# Patient Record
Sex: FEMALE | Race: WHITE | NOT HISPANIC OR LATINO | Employment: OTHER | URBAN - METROPOLITAN AREA
[De-identification: names, ages, dates, MRNs, and addresses within clinical notes are randomized per-mention and may not be internally consistent; named-entity substitution may affect disease eponyms.]

---

## 2018-01-25 ENCOUNTER — HOSPITAL ENCOUNTER (OUTPATIENT)
Dept: LAB | Facility: HOSPITAL | Age: 53
Discharge: HOME | End: 2018-01-25
Attending: INTERNAL MEDICINE | Admitting: INTERNAL MEDICINE

## 2018-01-25 LAB
ALBUMIN SERPL-MCNC: 3.5 G/DL (ref 3.5–4.8)
ALBUMIN/GLOB SERPL: 0.9 {RATIO} (ref 1–1.7)
ALP SERPL-CCNC: 46 IU/L (ref 32–91)
ALT SERPL-CCNC: 15 IU/L (ref 14–54)
ANION GAP SERPL CALC-SCNC: 10 MMOL/L (ref 10–20)
AST SERPL-CCNC: 14 IU/L (ref 15–41)
BASOPHILS # BLD AUTO: 0 10*3/UL (ref 0–0.2)
BASOPHILS NFR BLD AUTO: 0 % (ref 0–2)
BILIRUB SERPL-MCNC: 0.5 MG/DL (ref 0.3–1.2)
BILIRUB UR QL STRIP: NEGATIVE MG/DL
BUN SERPL-MCNC: 12 MG/DL (ref 8–20)
BUN/CREAT SERPL: 20 (ref 5.4–26.2)
CALCIUM SERPL-MCNC: 9.4 MG/DL (ref 8.9–10.3)
CASTS URNS QL MICRO: NORMAL /[LPF]
CHLORIDE SERPL-SCNC: 103 MMOL/L (ref 101–111)
COLOR UR: YELLOW
CONV BACTERIA IN URINE MICRO: NEGATIVE
CONV CLARITY OF URINE: CLEAR
CONV CO2: 28 MMOL/L (ref 22–32)
CONV HYALINE CASTS IN URINE MICRO: 0 /[LPF] (ref 0–5)
CONV PROTEIN IN URINE BY AUTOMATED TEST STRIP: NEGATIVE MG/DL
CONV SMALL ROUND CELLS: NORMAL /[HPF]
CONV TOTAL PROTEIN: 7.4 G/DL (ref 6.1–7.9)
CONV UROBILINOGEN IN URINE BY AUTOMATED TEST STRIP: 0.2 MG/DL
CREAT UR-MCNC: 0.6 MG/DL (ref 0.4–1)
CRP SERPL-MCNC: 1.93 MG/DL (ref 0–0.7)
CULTURE INDICATED?: NORMAL
DIFFERENTIAL METHOD BLD: (no result)
EOSINOPHIL # BLD AUTO: 0.1 10*3/UL (ref 0–0.3)
EOSINOPHIL # BLD AUTO: 1 % (ref 0–3)
ERYTHROCYTE [DISTWIDTH] IN BLOOD BY AUTOMATED COUNT: 14.5 % (ref 11.5–14.5)
ERYTHROCYTE [SEDIMENTATION RATE] IN BLOOD BY WESTERGREN METHOD: 49 MM/HR (ref 0–30)
GLOBULIN UR ELPH-MCNC: 3.9 G/DL (ref 2.5–3.8)
GLUCOSE SERPL-MCNC: 170 MG/DL (ref 65–99)
GLUCOSE UR QL: NEGATIVE MG/DL
HCT VFR BLD AUTO: 42.4 % (ref 35–49)
HGB BLD-MCNC: 14.1 G/DL (ref 12–15)
HGB UR QL STRIP: NEGATIVE
KETONES UR QL STRIP: NEGATIVE MG/DL
LEUKOCYTE ESTERASE UR QL STRIP: NEGATIVE
LYMPHOCYTES # BLD AUTO: 1.5 10*3/UL (ref 0.8–4.8)
LYMPHOCYTES NFR BLD AUTO: 16 % (ref 18–42)
MCH RBC QN AUTO: 29.3 PG (ref 26–32)
MCHC RBC AUTO-ENTMCNC: 33.2 G/DL (ref 32–36)
MCV RBC AUTO: 88.2 FL (ref 80–94)
MONOCYTES # BLD AUTO: 0.6 10*3/UL (ref 0.1–1.3)
MONOCYTES NFR BLD AUTO: 6 % (ref 2–11)
NEUTROPHILS # BLD AUTO: 6.9 10*3/UL (ref 2.3–8.6)
NEUTROPHILS NFR BLD AUTO: 77 % (ref 50–75)
NITRITE UR QL STRIP: NEGATIVE
NRBC BLD AUTO-RTO: 0 /100{WBCS}
NRBC/RBC NFR BLD MANUAL: 0 10*3/UL
PH UR STRIP.AUTO: 6 [PH] (ref 4.5–8)
PLATELET # BLD AUTO: 199 10*3/UL (ref 150–450)
PMV BLD AUTO: 8.9 FL (ref 7.4–10.4)
POTASSIUM SERPL-SCNC: 4 MMOL/L (ref 3.6–5.1)
RBC # BLD AUTO: 4.8 10*6/UL (ref 4–5.4)
RBC #/AREA URNS HPF: 0 /[HPF] (ref 0–3)
SODIUM SERPL-SCNC: 137 MMOL/L (ref 136–144)
SP GR UR: 1.01 (ref 1–1.03)
SPERM URNS QL MICRO: NORMAL /[HPF]
SQUAMOUS SPT QL MICRO: 1 /[HPF] (ref 0–5)
UNIDENT CRYS URNS QL MICRO: NORMAL /[HPF]
WBC # BLD AUTO: 9.2 10*3/UL (ref 4.5–11.5)
WBC #/AREA URNS HPF: 0 /[HPF] (ref 0–5)
YEAST SPEC QL WET PREP: NORMAL /[HPF]

## 2018-04-11 ENCOUNTER — HOSPITAL ENCOUNTER (OUTPATIENT)
Dept: RHEUMATOLOGY | Facility: CLINIC | Age: 53
Discharge: HOME | End: 2018-04-11
Attending: INTERNAL MEDICINE | Admitting: INTERNAL MEDICINE

## 2018-06-26 ENCOUNTER — HOSPITAL ENCOUNTER (OUTPATIENT)
Dept: LAB | Facility: HOSPITAL | Age: 53
Discharge: HOME | End: 2018-06-26
Attending: INTERNAL MEDICINE | Admitting: INTERNAL MEDICINE

## 2018-06-26 LAB
ALBUMIN SERPL-MCNC: 3.5 G/DL (ref 3.5–4.8)
ALBUMIN/GLOB SERPL: 1 {RATIO} (ref 1–1.7)
ALP SERPL-CCNC: 54 IU/L (ref 32–91)
ALT SERPL-CCNC: 16 IU/L (ref 14–54)
ANION GAP SERPL CALC-SCNC: 10.9 MMOL/L (ref 10–20)
AST SERPL-CCNC: 15 IU/L (ref 15–41)
BASOPHILS # BLD AUTO: 0.1 10*3/UL (ref 0–0.2)
BASOPHILS NFR BLD AUTO: 1 % (ref 0–2)
BILIRUB SERPL-MCNC: 0.5 MG/DL (ref 0.3–1.2)
BILIRUB UR QL STRIP: NEGATIVE MG/DL
BUN SERPL-MCNC: 14 MG/DL (ref 8–20)
BUN/CREAT SERPL: 17.5 (ref 5.4–26.2)
CALCIUM SERPL-MCNC: 9.2 MG/DL (ref 8.9–10.3)
CASTS URNS QL MICRO: ABNORMAL /[LPF]
CHLORIDE SERPL-SCNC: 101 MMOL/L (ref 101–111)
COLOR UR: YELLOW
CONV BACTERIA IN URINE MICRO: NEGATIVE
CONV CLARITY OF URINE: CLEAR
CONV CO2: 28 MMOL/L (ref 22–32)
CONV HYALINE CASTS IN URINE MICRO: 0 /[LPF] (ref 0–5)
CONV PROTEIN IN URINE BY AUTOMATED TEST STRIP: NEGATIVE MG/DL
CONV SMALL ROUND CELLS: ABNORMAL /[HPF]
CONV TOTAL PROTEIN: 7 G/DL (ref 6.1–7.9)
CONV UROBILINOGEN IN URINE BY AUTOMATED TEST STRIP: 0.2 MG/DL
CREAT UR-MCNC: 0.8 MG/DL (ref 0.4–1)
CRP SERPL-MCNC: 1.52 MG/DL (ref 0–0.7)
CULTURE INDICATED?: ABNORMAL
DIFFERENTIAL METHOD BLD: (no result)
EOSINOPHIL # BLD AUTO: 0.1 10*3/UL (ref 0–0.3)
EOSINOPHIL # BLD AUTO: 2 % (ref 0–3)
ERYTHROCYTE [DISTWIDTH] IN BLOOD BY AUTOMATED COUNT: 15.9 % (ref 11.5–14.5)
ERYTHROCYTE [SEDIMENTATION RATE] IN BLOOD BY WESTERGREN METHOD: 27 MM/HR (ref 0–30)
GLOBULIN UR ELPH-MCNC: 3.5 G/DL (ref 2.5–3.8)
GLUCOSE SERPL-MCNC: 257 MG/DL (ref 65–99)
GLUCOSE UR QL: 250 MG/DL
HAV IGM SERPL QL IA: NONREACTIVE
HBV CORE IGM SERPL QL IA: NONREACTIVE
HBV SURFACE AG SERPL QL IA: NONREACTIVE
HCT VFR BLD AUTO: 43.3 % (ref 35–49)
HCV AB SER DONR QL: NORMAL
HCV AB SER DONR QL: NORMAL
HGB BLD-MCNC: 14.2 G/DL (ref 12–15)
HGB UR QL STRIP: NEGATIVE
KETONES UR QL STRIP: NEGATIVE MG/DL
LEUKOCYTE ESTERASE UR QL STRIP: NEGATIVE
LYMPHOCYTES # BLD AUTO: 1.6 10*3/UL (ref 0.8–4.8)
LYMPHOCYTES NFR BLD AUTO: 19 % (ref 18–42)
MCH RBC QN AUTO: 28.9 PG (ref 26–32)
MCHC RBC AUTO-ENTMCNC: 32.7 G/DL (ref 32–36)
MCV RBC AUTO: 88.4 FL (ref 80–94)
MONOCYTES # BLD AUTO: 0.7 10*3/UL (ref 0.1–1.3)
MONOCYTES NFR BLD AUTO: 8 % (ref 2–11)
NEUTROPHILS # BLD AUTO: 6.2 10*3/UL (ref 2.3–8.6)
NEUTROPHILS NFR BLD AUTO: 70 % (ref 50–75)
NITRITE UR QL STRIP: NEGATIVE
NRBC BLD AUTO-RTO: 0 /100{WBCS}
NRBC/RBC NFR BLD MANUAL: 0 10*3/UL
PH UR STRIP.AUTO: 6.5 [PH] (ref 4.5–8)
PLATELET # BLD AUTO: 231 10*3/UL (ref 150–450)
PMV BLD AUTO: 8.6 FL (ref 7.4–10.4)
POTASSIUM SERPL-SCNC: 3.9 MMOL/L (ref 3.6–5.1)
RBC # BLD AUTO: 4.9 10*6/UL (ref 4–5.4)
RBC #/AREA URNS HPF: 1 /[HPF] (ref 0–3)
SODIUM SERPL-SCNC: 136 MMOL/L (ref 136–144)
SP GR UR: 1.01 (ref 1–1.03)
SPERM URNS QL MICRO: ABNORMAL /[HPF]
SQUAMOUS SPT QL MICRO: 0 /[HPF] (ref 0–5)
UNIDENT CRYS URNS QL MICRO: ABNORMAL /[HPF]
WBC # BLD AUTO: 8.8 10*3/UL (ref 4.5–11.5)
WBC #/AREA URNS HPF: 0 /[HPF] (ref 0–5)
YEAST SPEC QL WET PREP: ABNORMAL /[HPF]

## 2018-06-26 PROCEDURE — 86481 TB AG RESPONSE T-CELL SUSP: CPT

## 2018-06-27 ENCOUNTER — LAB REQUISITION (OUTPATIENT)
Dept: LAB | Facility: HOSPITAL | Age: 53
End: 2018-06-27

## 2018-06-27 DIAGNOSIS — Z00.00 ROUTINE GENERAL MEDICAL EXAMINATION AT A HEALTH CARE FACILITY: ICD-10-CM

## 2018-06-27 LAB
CONV HIV-1/ HIV-2: NORMAL
CONV HIV-1/ HIV-2: NORMAL

## 2018-06-28 LAB
INTERPRETATION UR IFE-IMP: NORMAL
PROT PATTERN SERPL IFE-IMP: NORMAL
TSPOT INTERPRETATION: NEGATIVE
TSPOT INTERPRETATION: NORMAL
TSPOT NIL CONTROL INTERPRETATION: NORMAL
TSPOT PANEL A: 0
TSPOT PANEL B: 0
TSPOT POS CONTROL INTERPRETATION: NORMAL

## 2018-09-28 ENCOUNTER — HOSPITAL ENCOUNTER (OUTPATIENT)
Dept: LAB | Facility: HOSPITAL | Age: 53
Discharge: HOME | End: 2018-09-28
Attending: INTERNAL MEDICINE | Admitting: INTERNAL MEDICINE

## 2018-09-28 LAB
ALBUMIN SERPL-MCNC: 3.6 G/DL (ref 3.5–4.8)
ALBUMIN/GLOB SERPL: 1.1 {RATIO} (ref 1–1.7)
ALP SERPL-CCNC: 54 IU/L (ref 32–91)
ALT SERPL-CCNC: 14 IU/L (ref 14–54)
ANION GAP SERPL CALC-SCNC: 11.7 MMOL/L (ref 10–20)
AST SERPL-CCNC: 15 IU/L (ref 15–41)
BASOPHILS # BLD AUTO: 0 10*3/UL (ref 0–0.2)
BASOPHILS NFR BLD AUTO: 1 % (ref 0–2)
BILIRUB SERPL-MCNC: 0.4 MG/DL (ref 0.3–1.2)
BILIRUB UR QL STRIP: NEGATIVE MG/DL
BUN SERPL-MCNC: 17 MG/DL (ref 8–20)
BUN/CREAT SERPL: 21.3 (ref 5.4–26.2)
CALCIUM SERPL-MCNC: 9.2 MG/DL (ref 8.9–10.3)
CASTS URNS QL MICRO: NORMAL /[LPF]
CHLORIDE SERPL-SCNC: 102 MMOL/L (ref 101–111)
COLOR UR: YELLOW
CONV BACTERIA IN URINE MICRO: NEGATIVE
CONV CLARITY OF URINE: CLEAR
CONV CO2: 29 MMOL/L (ref 22–32)
CONV HYALINE CASTS IN URINE MICRO: 2 /[LPF] (ref 0–5)
CONV PROTEIN IN URINE BY AUTOMATED TEST STRIP: NEGATIVE MG/DL
CONV SMALL ROUND CELLS: NORMAL /[HPF]
CONV TOTAL PROTEIN: 6.9 G/DL (ref 6.1–7.9)
CONV UROBILINOGEN IN URINE BY AUTOMATED TEST STRIP: 0.2 MG/DL
CREAT UR-MCNC: 0.8 MG/DL (ref 0.4–1)
CRP SERPL-MCNC: 1.13 MG/DL (ref 0–0.7)
CULTURE INDICATED?: NORMAL
DIFFERENTIAL METHOD BLD: (no result)
EOSINOPHIL # BLD AUTO: 0.2 10*3/UL (ref 0–0.3)
EOSINOPHIL # BLD AUTO: 3 % (ref 0–3)
ERYTHROCYTE [DISTWIDTH] IN BLOOD BY AUTOMATED COUNT: 15.6 % (ref 11.5–14.5)
ERYTHROCYTE [SEDIMENTATION RATE] IN BLOOD BY WESTERGREN METHOD: 35 MM/HR (ref 0–30)
GLOBULIN UR ELPH-MCNC: 3.3 G/DL (ref 2.5–3.8)
GLUCOSE SERPL-MCNC: 152 MG/DL (ref 65–99)
GLUCOSE UR QL: NEGATIVE MG/DL
HCT VFR BLD AUTO: 40.4 % (ref 35–49)
HGB BLD-MCNC: 13.6 G/DL (ref 12–15)
HGB UR QL STRIP: NEGATIVE
KETONES UR QL STRIP: NEGATIVE MG/DL
LEUKOCYTE ESTERASE UR QL STRIP: NEGATIVE
LYMPHOCYTES # BLD AUTO: 2.3 10*3/UL (ref 0.8–4.8)
LYMPHOCYTES NFR BLD AUTO: 24 % (ref 18–42)
MCH RBC QN AUTO: 30.9 PG (ref 26–32)
MCHC RBC AUTO-ENTMCNC: 33.7 G/DL (ref 32–36)
MCV RBC AUTO: 91.7 FL (ref 80–94)
MONOCYTES # BLD AUTO: 0.9 10*3/UL (ref 0.1–1.3)
MONOCYTES NFR BLD AUTO: 9 % (ref 2–11)
NEUTROPHILS # BLD AUTO: 6.2 10*3/UL (ref 2.3–8.6)
NEUTROPHILS NFR BLD AUTO: 63 % (ref 50–75)
NITRITE UR QL STRIP: NEGATIVE
NRBC BLD AUTO-RTO: 0 /100{WBCS}
NRBC/RBC NFR BLD MANUAL: 0 10*3/UL
PH UR STRIP.AUTO: 5 [PH] (ref 4.5–8)
PLATELET # BLD AUTO: 227 10*3/UL (ref 150–450)
PMV BLD AUTO: 8 FL (ref 7.4–10.4)
POTASSIUM SERPL-SCNC: 3.7 MMOL/L (ref 3.6–5.1)
RBC # BLD AUTO: 4.41 10*6/UL (ref 4–5.4)
RBC #/AREA URNS HPF: 1 /[HPF] (ref 0–3)
SODIUM SERPL-SCNC: 139 MMOL/L (ref 136–144)
SP GR UR: 1.02 (ref 1–1.03)
SPERM URNS QL MICRO: NORMAL /[HPF]
SQUAMOUS SPT QL MICRO: 3 /[HPF] (ref 0–5)
UNIDENT CRYS URNS QL MICRO: NORMAL /[HPF]
WBC # BLD AUTO: 9.7 10*3/UL (ref 4.5–11.5)
WBC #/AREA URNS HPF: 0 /[HPF] (ref 0–5)
YEAST SPEC QL WET PREP: NORMAL /[HPF]

## 2019-05-01 ENCOUNTER — HOSPITAL ENCOUNTER (OUTPATIENT)
Dept: LAB | Facility: HOSPITAL | Age: 54
Discharge: HOME | End: 2019-05-01
Attending: INTERNAL MEDICINE | Admitting: INTERNAL MEDICINE

## 2019-05-20 ENCOUNTER — CONVERSION ENCOUNTER (OUTPATIENT)
Dept: RHEUMATOLOGY | Facility: CLINIC | Age: 54
End: 2019-05-20

## 2019-06-04 VITALS
HEART RATE: 94 BPM | BODY MASS INDEX: 48.82 KG/M2 | DIASTOLIC BLOOD PRESSURE: 83 MMHG | HEIGHT: 65 IN | WEIGHT: 293 LBS | SYSTOLIC BLOOD PRESSURE: 154 MMHG

## 2019-06-20 NOTE — TELEPHONE ENCOUNTER
Phone Note   Outgoing Call  Summary of Call: Please run benefits for Humira she had her first injection today..Thanks    Routed to [place]  Call placed by: Rocio Elizabeth CMA,  May 20, 2019 2:54 PM    Follow-up for Phone Call   Follow-up Details: Humira - submitted to [facility].  Follow-up by: Cookie Guerrero,  June 12, 2019 1:58 PM            Electronically signed by Cookie Guerrero on 06/20/2019 at 10:35 AM  ________________________________________________________________________       Disclaimer: Converted Note message may not contain all data elements that existed in the [employer] source system. Please see [facility] for the original note details.

## 2019-06-28 RX ORDER — DULOXETIN HYDROCHLORIDE 30 MG/1
CAPSULE, DELAYED RELEASE ORAL
Qty: 180 CAPSULE | Refills: 1 | Status: SHIPPED | OUTPATIENT
Start: 2019-06-28 | End: 2020-01-21

## 2019-08-29 RX ORDER — METHOTREXATE 25 MG/ML
INJECTION INTRA-ARTERIAL; INTRAMUSCULAR; INTRATHECAL; INTRAVENOUS
Qty: 2 ML | Refills: 6 | Status: SHIPPED | OUTPATIENT
Start: 2019-08-29 | End: 2020-05-20 | Stop reason: SDUPTHER

## 2019-09-16 RX ORDER — ADALIMUMAB 40MG/0.4ML
KIT SUBCUTANEOUS
Qty: 2 ML | Refills: 1 | Status: SHIPPED | OUTPATIENT
Start: 2019-09-16 | End: 2019-11-08 | Stop reason: SDUPTHER

## 2019-10-17 ENCOUNTER — LAB (OUTPATIENT)
Dept: LAB | Facility: HOSPITAL | Age: 54
End: 2019-10-17

## 2019-10-17 ENCOUNTER — TRANSCRIBE ORDERS (OUTPATIENT)
Dept: LAB | Facility: HOSPITAL | Age: 54
End: 2019-10-17

## 2019-10-17 DIAGNOSIS — N39.0 URINARY TRACT INFECTION WITHOUT HEMATURIA, SITE UNSPECIFIED: ICD-10-CM

## 2019-10-17 DIAGNOSIS — M13.80 MIGRATORY POLYARTHRITIS: ICD-10-CM

## 2019-10-17 DIAGNOSIS — Z79.899 ENCOUNTER FOR LONG-TERM (CURRENT) USE OF OTHER MEDICATIONS: Primary | ICD-10-CM

## 2019-10-17 DIAGNOSIS — Z79.899 ENCOUNTER FOR LONG-TERM (CURRENT) USE OF OTHER MEDICATIONS: ICD-10-CM

## 2019-10-17 LAB
ALBUMIN SERPL-MCNC: 4.2 G/DL (ref 3.5–5.2)
ALBUMIN/GLOB SERPL: 1.3 G/DL
ALP SERPL-CCNC: 46 U/L (ref 39–117)
ALT SERPL W P-5'-P-CCNC: 19 U/L (ref 1–33)
ANION GAP SERPL CALCULATED.3IONS-SCNC: 12 MMOL/L (ref 5–15)
AST SERPL-CCNC: 9 U/L (ref 1–32)
BACTERIA UR QL AUTO: ABNORMAL /HPF
BASOPHILS # BLD AUTO: 0.04 10*3/MM3 (ref 0–0.2)
BASOPHILS NFR BLD AUTO: 0.5 % (ref 0–1.5)
BILIRUB SERPL-MCNC: 0.5 MG/DL (ref 0.2–1.2)
BILIRUB UR QL STRIP: NEGATIVE
BUN BLD-MCNC: 14 MG/DL (ref 6–20)
BUN/CREAT SERPL: 18.4 (ref 7–25)
CALCIUM SPEC-SCNC: 9.3 MG/DL (ref 8.6–10.5)
CHLORIDE SERPL-SCNC: 95 MMOL/L (ref 98–107)
CLARITY UR: CLEAR
CO2 SERPL-SCNC: 29 MMOL/L (ref 22–29)
COLOR UR: YELLOW
CREAT BLD-MCNC: 0.76 MG/DL (ref 0.57–1)
CRP SERPL-MCNC: 0.95 MG/DL (ref 0–0.5)
DEPRECATED RDW RBC AUTO: 45.3 FL (ref 37–54)
EOSINOPHIL # BLD AUTO: 0.14 10*3/MM3 (ref 0–0.4)
EOSINOPHIL NFR BLD AUTO: 1.7 % (ref 0.3–6.2)
ERYTHROCYTE [DISTWIDTH] IN BLOOD BY AUTOMATED COUNT: 13.6 % (ref 12.3–15.4)
ERYTHROCYTE [SEDIMENTATION RATE] IN BLOOD: 44 MM/HR (ref 0–30)
GFR SERPL CREATININE-BSD FRML MDRD: 79 ML/MIN/1.73
GLOBULIN UR ELPH-MCNC: 3.3 GM/DL
GLUCOSE BLD-MCNC: 110 MG/DL (ref 65–99)
GLUCOSE UR STRIP-MCNC: NEGATIVE MG/DL
HCT VFR BLD AUTO: 40.6 % (ref 34–46.6)
HGB BLD-MCNC: 13.9 G/DL (ref 12–15.9)
HGB UR QL STRIP.AUTO: NEGATIVE
HYALINE CASTS UR QL AUTO: ABNORMAL /LPF
IMM GRANULOCYTES # BLD AUTO: 0.04 10*3/MM3 (ref 0–0.05)
IMM GRANULOCYTES NFR BLD AUTO: 0.5 % (ref 0–0.5)
KETONES UR QL STRIP: NEGATIVE
LEUKOCYTE ESTERASE UR QL STRIP.AUTO: ABNORMAL
LYMPHOCYTES # BLD AUTO: 2.03 10*3/MM3 (ref 0.7–3.1)
LYMPHOCYTES NFR BLD AUTO: 23.9 % (ref 19.6–45.3)
MCH RBC QN AUTO: 31.5 PG (ref 26.6–33)
MCHC RBC AUTO-ENTMCNC: 34.2 G/DL (ref 31.5–35.7)
MCV RBC AUTO: 92.1 FL (ref 79–97)
MONOCYTES # BLD AUTO: 0.63 10*3/MM3 (ref 0.1–0.9)
MONOCYTES NFR BLD AUTO: 7.4 % (ref 5–12)
NEUTROPHILS # BLD AUTO: 5.6 10*3/MM3 (ref 1.7–7)
NEUTROPHILS NFR BLD AUTO: 66 % (ref 42.7–76)
NITRITE UR QL STRIP: NEGATIVE
NRBC BLD AUTO-RTO: 0 /100 WBC (ref 0–0.2)
PH UR STRIP.AUTO: 7 [PH] (ref 5–8)
PLATELET # BLD AUTO: 251 10*3/MM3 (ref 140–450)
PMV BLD AUTO: 10 FL (ref 6–12)
POTASSIUM BLD-SCNC: 3.7 MMOL/L (ref 3.5–5.2)
PROT SERPL-MCNC: 7.5 G/DL (ref 6–8.5)
PROT UR QL STRIP: NEGATIVE
RBC # BLD AUTO: 4.41 10*6/MM3 (ref 3.77–5.28)
RBC # UR: ABNORMAL /HPF
REF LAB TEST METHOD: ABNORMAL
SODIUM BLD-SCNC: 136 MMOL/L (ref 136–145)
SP GR UR STRIP: 1.02 (ref 1–1.03)
SQUAMOUS #/AREA URNS HPF: ABNORMAL /HPF
UROBILINOGEN UR QL STRIP: ABNORMAL
WBC NRBC COR # BLD: 8.48 10*3/MM3 (ref 3.4–10.8)
WBC UR QL AUTO: ABNORMAL /HPF

## 2019-10-17 PROCEDURE — 81001 URINALYSIS AUTO W/SCOPE: CPT

## 2019-10-17 PROCEDURE — 85025 COMPLETE CBC W/AUTO DIFF WBC: CPT

## 2019-10-17 PROCEDURE — 86140 C-REACTIVE PROTEIN: CPT

## 2019-10-17 PROCEDURE — 80053 COMPREHEN METABOLIC PANEL: CPT

## 2019-10-17 PROCEDURE — 85652 RBC SED RATE AUTOMATED: CPT

## 2019-10-17 PROCEDURE — 36415 COLL VENOUS BLD VENIPUNCTURE: CPT

## 2019-10-28 ENCOUNTER — OFFICE VISIT (OUTPATIENT)
Dept: RHEUMATOLOGY | Facility: CLINIC | Age: 54
End: 2019-10-28

## 2019-10-28 VITALS
HEIGHT: 65 IN | DIASTOLIC BLOOD PRESSURE: 81 MMHG | WEIGHT: 293 LBS | HEART RATE: 65 BPM | BODY MASS INDEX: 48.82 KG/M2 | SYSTOLIC BLOOD PRESSURE: 136 MMHG

## 2019-10-28 DIAGNOSIS — M15.9 OSTEOARTHRITIS OF MULTIPLE JOINTS, UNSPECIFIED OSTEOARTHRITIS TYPE: ICD-10-CM

## 2019-10-28 DIAGNOSIS — M79.7 FIBROMYALGIA: ICD-10-CM

## 2019-10-28 DIAGNOSIS — M19.90 INFLAMMATORY ARTHRITIS: Primary | ICD-10-CM

## 2019-10-28 DIAGNOSIS — Z79.899 LONG-TERM USE OF HIGH-RISK MEDICATION: ICD-10-CM

## 2019-10-28 DIAGNOSIS — M81.0 OSTEOPOROSIS, UNSPECIFIED OSTEOPOROSIS TYPE, UNSPECIFIED PATHOLOGICAL FRACTURE PRESENCE: ICD-10-CM

## 2019-10-28 PROBLEM — E55.9 VITAMIN D DEFICIENCY: Status: ACTIVE | Noted: 2018-01-25

## 2019-10-28 PROBLEM — F32.A DEPRESSION: Status: ACTIVE | Noted: 2018-04-11

## 2019-10-28 PROBLEM — E11.9 DIABETES MELLITUS (HCC): Status: ACTIVE | Noted: 2019-10-28

## 2019-10-28 PROBLEM — R53.83 FATIGUE: Status: ACTIVE | Noted: 2018-04-11

## 2019-10-28 PROBLEM — R70.0 ELEVATED ERYTHROCYTE SEDIMENTATION RATE: Status: ACTIVE | Noted: 2018-04-11

## 2019-10-28 PROBLEM — M85.88 OTHER SPECIFIED DISORDERS OF BONE DENSITY AND STRUCTURE, OTHER SITE: Status: ACTIVE | Noted: 2018-10-26

## 2019-10-28 PROBLEM — E66.9 OBESITY: Status: ACTIVE | Noted: 2018-04-11

## 2019-10-28 PROBLEM — Z13.820 ENCOUNTER FOR SCREENING FOR OSTEOPOROSIS: Status: ACTIVE | Noted: 2018-10-26

## 2019-10-28 PROBLEM — M75.80 ROTATOR CUFF TENDONITIS: Status: ACTIVE | Noted: 2018-07-10

## 2019-10-28 PROBLEM — M25.532 ARTHRALGIA OF LEFT WRIST: Status: ACTIVE | Noted: 2018-10-26

## 2019-10-28 PROBLEM — M13.80 SERONEGATIVE ARTHRITIS: Status: ACTIVE | Noted: 2018-07-10

## 2019-10-28 PROBLEM — R07.9 CHEST PAIN: Status: ACTIVE | Noted: 2019-10-28

## 2019-10-28 PROBLEM — N39.0 URINARY TRACT INFECTION: Status: ACTIVE | Noted: 2018-01-25

## 2019-10-28 PROBLEM — M25.50 JOINT PAIN: Status: ACTIVE | Noted: 2018-01-25

## 2019-10-28 PROBLEM — G56.03 CARPAL TUNNEL SYNDROME, BILATERAL UPPER LIMBS: Status: ACTIVE | Noted: 2018-10-26

## 2019-10-28 PROCEDURE — 99214 OFFICE O/P EST MOD 30 MIN: CPT | Performed by: INTERNAL MEDICINE

## 2019-10-28 RX ORDER — METOPROLOL SUCCINATE 50 MG/1
50 TABLET, EXTENDED RELEASE ORAL EVERY EVENING
Refills: 1 | COMMUNITY
Start: 2019-09-26 | End: 2021-07-15

## 2019-10-28 RX ORDER — PRAVASTATIN SODIUM 10 MG
10 TABLET ORAL DAILY
COMMUNITY
End: 2020-05-20 | Stop reason: SDUPTHER

## 2019-10-28 RX ORDER — FOLIC ACID 1 MG/1
1000 TABLET ORAL DAILY
Refills: 1 | COMMUNITY
Start: 2019-10-22 | End: 2020-01-14

## 2019-10-28 RX ORDER — HYDROCODONE BITARTRATE AND ACETAMINOPHEN 7.5; 325 MG/1; MG/1
TABLET ORAL
Refills: 0 | COMMUNITY
Start: 2019-10-23 | End: 2020-05-20 | Stop reason: SDUPTHER

## 2019-10-28 RX ORDER — SYRINGE AND NEEDLE,INSULIN,1ML 25GX1"
SYRINGE, EMPTY DISPOSABLE MISCELLANEOUS
Refills: 3 | COMMUNITY
Start: 2019-08-10 | End: 2020-05-20 | Stop reason: SDUPTHER

## 2019-10-28 RX ORDER — PIOGLITAZONEHYDROCHLORIDE 15 MG/1
15 TABLET ORAL DAILY
Refills: 1 | COMMUNITY
Start: 2019-10-24 | End: 2020-10-08

## 2019-10-28 RX ORDER — FLUTICASONE PROPIONATE 50 MCG
1 SPRAY, SUSPENSION (ML) NASAL 2 TIMES DAILY PRN
COMMUNITY
Start: 2018-01-25 | End: 2021-07-15 | Stop reason: ALTCHOICE

## 2019-10-28 RX ORDER — LOSARTAN POTASSIUM 100 MG/1
100 TABLET ORAL DAILY
Refills: 2 | COMMUNITY
Start: 2019-09-26 | End: 2021-07-15

## 2019-10-28 RX ORDER — SEMAGLUTIDE 1.34 MG/ML
0.5 INJECTION, SOLUTION SUBCUTANEOUS WEEKLY
COMMUNITY
Start: 2019-09-27

## 2019-10-28 RX ORDER — AMLODIPINE BESYLATE 5 MG/1
2.5 TABLET ORAL DAILY
Refills: 0 | COMMUNITY
Start: 2019-09-18 | End: 2021-07-15

## 2019-10-28 RX ORDER — LINAGLIPTIN 5 MG/1
5 TABLET, FILM COATED ORAL DAILY
Refills: 0 | COMMUNITY
Start: 2019-08-02

## 2019-10-28 RX ORDER — SYRINGE-NEEDLE,INSULIN,0.5 ML 28GX1/2"
SYRINGE, EMPTY DISPOSABLE MISCELLANEOUS
COMMUNITY
Start: 2018-07-10 | End: 2020-02-24 | Stop reason: SDUPTHER

## 2019-10-28 RX ORDER — HYDROCHLOROTHIAZIDE 12.5 MG/1
12.5 CAPSULE, GELATIN COATED ORAL DAILY
Refills: 1 | COMMUNITY
Start: 2019-10-24

## 2019-10-28 RX ORDER — FAMOTIDINE 20 MG/1
10 TABLET, FILM COATED ORAL DAILY
Refills: 2 | COMMUNITY
Start: 2019-09-26

## 2019-11-01 ENCOUNTER — TELEPHONE (OUTPATIENT)
Dept: RHEUMATOLOGY | Facility: CLINIC | Age: 54
End: 2019-11-01

## 2019-11-12 RX ORDER — ADALIMUMAB 40MG/0.4ML
KIT SUBCUTANEOUS
Qty: 3 EACH | Refills: 0 | Status: SHIPPED | OUTPATIENT
Start: 2019-11-12 | End: 2020-01-15

## 2020-01-07 ENCOUNTER — TELEPHONE (OUTPATIENT)
Dept: RHEUMATOLOGY | Facility: CLINIC | Age: 55
End: 2020-01-07

## 2020-01-08 ENCOUNTER — LAB (OUTPATIENT)
Dept: LAB | Facility: HOSPITAL | Age: 55
End: 2020-01-08

## 2020-01-08 DIAGNOSIS — M15.9 OSTEOARTHRITIS OF MULTIPLE JOINTS, UNSPECIFIED OSTEOARTHRITIS TYPE: ICD-10-CM

## 2020-01-08 DIAGNOSIS — Z79.899 LONG-TERM USE OF HIGH-RISK MEDICATION: ICD-10-CM

## 2020-01-08 DIAGNOSIS — M79.7 FIBROMYALGIA: ICD-10-CM

## 2020-01-08 DIAGNOSIS — M19.90 INFLAMMATORY ARTHRITIS: ICD-10-CM

## 2020-01-08 LAB
25(OH)D3 SERPL-MCNC: 31 NG/ML (ref 30–100)
ALBUMIN SERPL-MCNC: 4.1 G/DL (ref 3.5–5.2)
ALBUMIN/GLOB SERPL: 1.1 G/DL
ALP SERPL-CCNC: 45 U/L (ref 39–117)
ALT SERPL W P-5'-P-CCNC: 17 U/L (ref 1–33)
ANION GAP SERPL CALCULATED.3IONS-SCNC: 10.9 MMOL/L (ref 5–15)
ANISOCYTOSIS BLD QL: ABNORMAL
AST SERPL-CCNC: 13 U/L (ref 1–32)
BILIRUB SERPL-MCNC: 0.3 MG/DL (ref 0.2–1.2)
BUN BLD-MCNC: 14 MG/DL (ref 6–20)
BUN/CREAT SERPL: 17.9 (ref 7–25)
CALCIUM SPEC-SCNC: 9.7 MG/DL (ref 8.6–10.5)
CHLORIDE SERPL-SCNC: 101 MMOL/L (ref 98–107)
CO2 SERPL-SCNC: 28.1 MMOL/L (ref 22–29)
CREAT BLD-MCNC: 0.78 MG/DL (ref 0.57–1)
CRP SERPL-MCNC: 0.97 MG/DL (ref 0–0.5)
DEPRECATED RDW RBC AUTO: 46 FL (ref 37–54)
EOSINOPHIL # BLD MANUAL: 0.08 10*3/MM3 (ref 0–0.4)
EOSINOPHIL NFR BLD MANUAL: 1 % (ref 0.3–6.2)
ERYTHROCYTE [DISTWIDTH] IN BLOOD BY AUTOMATED COUNT: 13.6 % (ref 12.3–15.4)
ERYTHROCYTE [SEDIMENTATION RATE] IN BLOOD: 28 MM/HR (ref 0–30)
GFR SERPL CREATININE-BSD FRML MDRD: 76 ML/MIN/1.73
GLOBULIN UR ELPH-MCNC: 3.7 GM/DL
GLUCOSE BLD-MCNC: 109 MG/DL (ref 65–99)
HCT VFR BLD AUTO: 43.2 % (ref 34–46.6)
HGB BLD-MCNC: 14.2 G/DL (ref 12–15.9)
LYMPHOCYTES # BLD MANUAL: 3.01 10*3/MM3 (ref 0.7–3.1)
LYMPHOCYTES NFR BLD MANUAL: 38.6 % (ref 19.6–45.3)
LYMPHOCYTES NFR BLD MANUAL: 4 % (ref 5–12)
MCH RBC QN AUTO: 30.6 PG (ref 26.6–33)
MCHC RBC AUTO-ENTMCNC: 32.9 G/DL (ref 31.5–35.7)
MCV RBC AUTO: 93.1 FL (ref 79–97)
MONOCYTES # BLD AUTO: 0.31 10*3/MM3 (ref 0.1–0.9)
NEUTROPHILS # BLD AUTO: 4.4 10*3/MM3 (ref 1.7–7)
NEUTROPHILS NFR BLD MANUAL: 56.4 % (ref 42.7–76)
PLAT MORPH BLD: NORMAL
PLATELET # BLD AUTO: 244 10*3/MM3 (ref 140–450)
PMV BLD AUTO: 10.3 FL (ref 6–12)
POLYCHROMASIA BLD QL SMEAR: ABNORMAL
POTASSIUM BLD-SCNC: 4.3 MMOL/L (ref 3.5–5.2)
PROT SERPL-MCNC: 7.8 G/DL (ref 6–8.5)
RBC # BLD AUTO: 4.64 10*6/MM3 (ref 3.77–5.28)
SODIUM BLD-SCNC: 140 MMOL/L (ref 136–145)
WBC MORPH BLD: NORMAL
WBC NRBC COR # BLD: 7.8 10*3/MM3 (ref 3.4–10.8)

## 2020-01-08 PROCEDURE — 85652 RBC SED RATE AUTOMATED: CPT

## 2020-01-08 PROCEDURE — 36415 COLL VENOUS BLD VENIPUNCTURE: CPT | Performed by: INTERNAL MEDICINE

## 2020-01-08 PROCEDURE — 85007 BL SMEAR W/DIFF WBC COUNT: CPT

## 2020-01-08 PROCEDURE — 82306 VITAMIN D 25 HYDROXY: CPT | Performed by: INTERNAL MEDICINE

## 2020-01-08 PROCEDURE — 86140 C-REACTIVE PROTEIN: CPT

## 2020-01-08 PROCEDURE — 80053 COMPREHEN METABOLIC PANEL: CPT

## 2020-01-08 PROCEDURE — 85027 COMPLETE CBC AUTOMATED: CPT

## 2020-01-14 RX ORDER — FOLIC ACID 1 MG/1
TABLET ORAL
Qty: 90 TABLET | Refills: 1 | Status: SHIPPED | OUTPATIENT
Start: 2020-01-14 | End: 2020-10-08

## 2020-01-15 RX ORDER — ADALIMUMAB 40MG/0.4ML
KIT SUBCUTANEOUS
Qty: 6 EACH | Refills: 0 | Status: SHIPPED | OUTPATIENT
Start: 2020-01-15 | End: 2020-05-20 | Stop reason: SDUPTHER

## 2020-01-21 ENCOUNTER — OFFICE VISIT (OUTPATIENT)
Dept: BARIATRICS/WEIGHT MGMT | Facility: CLINIC | Age: 55
End: 2020-01-21

## 2020-01-21 ENCOUNTER — OFFICE VISIT (OUTPATIENT)
Dept: RHEUMATOLOGY | Facility: CLINIC | Age: 55
End: 2020-01-21

## 2020-01-21 VITALS
DIASTOLIC BLOOD PRESSURE: 71 MMHG | SYSTOLIC BLOOD PRESSURE: 153 MMHG | BODY MASS INDEX: 48.82 KG/M2 | HEART RATE: 76 BPM | HEIGHT: 65 IN | WEIGHT: 293 LBS

## 2020-01-21 DIAGNOSIS — M06.00 SERONEGATIVE RHEUMATOID ARTHRITIS (HCC): Primary | ICD-10-CM

## 2020-01-21 DIAGNOSIS — Z79.899 LONG-TERM USE OF HIGH-RISK MEDICATION: ICD-10-CM

## 2020-01-21 DIAGNOSIS — E66.01 SUPER-SUPER OBESE (HCC): Primary | ICD-10-CM

## 2020-01-21 DIAGNOSIS — M17.0 OSTEOARTHRITIS OF BOTH KNEES, UNSPECIFIED OSTEOARTHRITIS TYPE: ICD-10-CM

## 2020-01-21 PROCEDURE — 99214 OFFICE O/P EST MOD 30 MIN: CPT | Performed by: INTERNAL MEDICINE

## 2020-01-21 RX ORDER — DULOXETIN HYDROCHLORIDE 60 MG/1
60 CAPSULE, DELAYED RELEASE ORAL DAILY
Qty: 90 CAPSULE | Refills: 0 | Status: SHIPPED | OUTPATIENT
Start: 2020-01-21 | End: 2020-05-07

## 2020-01-21 NOTE — PROGRESS NOTES
HPI:  The patient is a 54-year-old female who comes today in follow-up for management of inflammatory arthritis, seronegative.  Her treatment consists of methotrexate 15 mg subcutaneously once a week plus folic acid 1 g p.o. daily.  She was using Humira up until the end of the year however the medication was discontinued because of insurance issues.  After stopping this medication the patient has not noticed any major changes in her clinical condition.  She is also known to suffer from fibromyalgia syndrome, she takes duloxetine 30 mg twice daily.    Today the patient reports generalized achy sometimes throbbing pain that is rated 7 out of 10, she has stiffness that lasts all day long, the pain is present mostly in her knees and her ankles especially the left one.  She has not noticed joint swelling.  She feels tired and fatigued most of the days.  She has poor nonrestorative sleep.    Review of systems is negative for fever chills, she has lost about 10 pounds, she is following weight watchers.  No dry eyes or dry mouth, no oral nasal ulcers, denies chest pain or shortness of breath.  All other systems reviewed and they were negative.    Social and family history reviewed and unchanged.    Laboratories no 1/8/2026 show a ESR of 28, creatinine 0.7, liver function test normal, CRP 0.97, CBC normal.          Rheumatologic history:     1. Inflammatory arthritis diagnosed/Sero (-) RA 04/2018  Sulfasalazine started 04/2018  MTX started 07/2018--dose decreased due to flu like symptoms and hair loss  -  Humira recommended 05/2019, samples provided, 1st dose given 05/20/2019     Vectra DA April 2018 50     2.  FMS  Cymbalta started 04/2018.     3. Bilateral  carpal tunnel syndrome            Past Medical History:   Diagnosis Date   • DM type 2 (diabetes mellitus, type 2) (CMS/[facility])    • Fibromyalgia, primary    • Rheumatoid arthritis (CMS/[facility])        Current Outpatient Medications   Medication Sig Dispense Refill   •  "amLODIPine (NORVASC) 5 MG tablet Take 5 mg by mouth Daily.  0   • B-D INSULIN SYRINGE 1CC/25GX1\" 25G X 1\" 1 ML misc USE AS DIRECTED WITH METHOTREXATE VIAL  3   • BREO ELLIPTA 200-25 MCG/INH inhaler Inhale 1 puff Daily.  5   • DULoxetine (CYMBALTA) 30 MG capsule TAKE 1 CAPSULE TWICE DAILY 180 capsule 1   • famotidine (PEPCID) 20 MG tablet Take 20 mg by mouth 2 (Two) Times a Day.  2   • fluticasone (FLONASE) 50 MCG/ACT nasal spray FLONASE 50 MCG/ACT NASAL SUSPENSION     • folic acid (FOLVITE) 1 MG tablet TAKE 1 TABLET BY MOUTH EVERY DAY 90 tablet 1   • HUMIRA PEN 40 MG/0.4ML Pen-injector Kit INJECT 1 PEN UNDER THE SKIN EVERY 14 DAYS. 6 each 0   • hydroCHLOROthiazide (MICROZIDE) 12.5 MG capsule Take 12.5 mg by mouth Daily.  1   • HYDROcodone-acetaminophen (NORCO) 7.5-325 MG per tablet TAKE 1 TO 2 TABLETS BY MOUTH EVERY 4 TO 6 HOURS AS NEEDED  0   • losartan (COZAAR) 100 MG tablet Take 100 mg by mouth Daily.  2   • Methotrexate Sodium (METHOTREXATE PF) 50 MG/2ML chemo syringe INJECT 20MG. SUB-Q EVERY WEEK (Patient taking differently: INJECT 15MG. SUB-Q EVERY WEEK) 2 mL 6   • metoprolol succinate XL (TOPROL-XL) 50 MG 24 hr tablet Take 50 mg by mouth Daily.  1   • OZEMPIC, 0.25 OR 0.5 MG/DOSE, 2 MG/1.5ML solution pen-injector      • pioglitazone (ACTOS) 15 MG tablet Take 15 mg by mouth Daily.  1   • pravastatin (PRAVACHOL) 10 MG tablet Take 10 mg by mouth Daily.     • sitaGLIPtin-metFORMIN (JANUMET)  MG per tablet Take 1 tablet by mouth.     • TRADJENTA 5 MG tablet tablet Take 5 mg by mouth Daily.  0   • Tuberculin Syringe (B-D TB SYRINGE 1CC/25GX5/8\") 25G X 5/8\" 1 ML misc BD TB SYRINGE 25G X 5/8\" 1 ML       No current facility-administered medications for this visit.        Physical exam:  There were no vitals filed for this visit.     GENERAL: Well-developed, well-nourished in no acute distress. Alert and oriented x3.  HEENT: Normocephalic, atraumatic. Pupils are equal, round, and reactive to light. Extraocular " muscles are intact. Mucous membranes are pink and moist. Nostrils are clear.   NECK: Supple without lymphadenopathy.  LUNGS: Clear to auscultation bilaterally.  HEART: Regular rate and rhythm without murmur, rub or gallop.  CHEST: Respirations easy and unlabored.  EXTREMITIES: No cyanosis, edema or clubbing.  SKIN: Warm, dry and intact.  MSK: Very few myofascial tender points.  No major joint tenderness, no signs of synovitis.    Assessment:  1.  Seronegative inflammatory arthritis.  Currently on methotrexate.  Has been off of Humira for about 4-5 weeks or so.  She has not noticed any major changes in her clinical condition.  Physical examination although challenging, does not show signs of synovitis.  MOREJON-28 score 2.9, low disease activity.  Plan to continue with methotrexate as is.    #2 long-term high-risk medications.  The patient is on medications for management of inflammatory arthritis.  I am monitoring for side effects.  Cancer screening:  Colonoscopy  NO  ;PAP  Dec 2017;  Mammogram; Jan 2018  Bone health: calcium and vitamin D: YES, DXA scan; 11/12/2019  Vaccines: Flu: 2019  ;PNA13: Dec 2018  ;Zoster: NO  X-rays of the chest; 10/2019 vane Hands;  4/11/18 Feet: NO  Hepatitis panel, HIV, QTB/PPD:  6/26/18 TB, HIV and HEP    3.  Body mass index of 65.  Deteriorated.  She is already losing some weight.  I have recommended and discussed with her regarding the importance of losing weight through healthy diet and exercise, for her overall health and also for her osteoarthritis and IA..    4.  Pain in both knees.  X-rays to be done today.    5.  Fibromyalgia syndrome.  Improved.  On Cymbalta.  Continue as is.    Plan:  Continue with methotrexate 15 mg subcutaneously once a week  Continue with folic acid 1 mg p.o. daily, take vitamin D 1000 international units p.o. a day (last vitamin D 31.6)  Weight loss through her diet and exercise  X-rays of the knees  Continue with Cymbalta 60 mg 1 tablet p.o. a day  RTC in 3  months or sooner if needed    Orders Placed This Encounter   Procedures   • XR Knee 1 or 2 View Right     Patient with OA     Standing Status:   Future     Standing Expiration Date:   1/21/2021     Order Specific Question:   Reason for Exam:     Answer:   chronic knee pain   • XR Knee 1 or 2 View Left     Patient with OA     Standing Status:   Future     Standing Expiration Date:   1/21/2021     Order Specific Question:   Reason for Exam:     Answer:   chronic knee pain   • Comprehensive Metabolic Panel     Standing Status:   Future     Standing Expiration Date:   1/21/2021   • C-reactive Protein     Standing Status:   Future     Standing Expiration Date:   1/21/2021   • Sedimentation Rate     Standing Status:   Future     Standing Expiration Date:   1/21/2021   • CBC With Manual Differential     Standing Status:   Future

## 2020-01-21 NOTE — PATIENT INSTRUCTIONS
Eat & Drink Separately by next appointment at dinner meal(s), Drink 64 fl oz of water per day by next appointment and Change coffee and tea to decafe

## 2020-01-21 NOTE — PROGRESS NOTES
"Bariatric Nutrition Counseling Interview    Patient Name:  Nighat May  YOB: 1965  Age:  54 y.o.  Sex:  female  MRN: [id number]  Date:  20    Procedure Considering:  Sleeve    Last Documented Height:    Ht Readings from Last 1 Encounters:   20 65 cm (25.59\")     Last Documented Weight:   Wt Readings from Last 1 Encounters:   20 (!) 176 kg (389 lb)      Body mass index is 417.62 kg/m².  [unfilled]    Highest Weight:  395 lb   Goal Weight: 235 lb     History:  Past Medical History:   Diagnosis Date   • DM type 2 (diabetes mellitus, type 2) (CMS/HCC)    • Fibromyalgia, primary    • Rheumatoid arthritis (CMS/HCC)      Past Surgical History:   Procedure Laterality Date   •  SECTION      x2   • HAND SURGERY      UCL repair right thumb   • TUBAL ABDOMINAL LIGATION       Family History   Problem Relation Age of Onset   • Diabetes Mother    • COPD Mother    • Heart disease Mother    • No Known Problems Father      Social History     Socioeconomic History   • Marital status:      Spouse name: Pepito May   • Number of children: 2   • Years of education: Not on file   • Highest education level: Not on file   Tobacco Use   • Smoking status: Current Every Day Smoker     Types: Electronic Cigarette, Cigarettes   • Smokeless tobacco: Never Used   • Tobacco comment: vape   Substance and Sexual Activity   • Alcohol use: Yes     Alcohol/week: 2.0 standard drinks     Types: 2 Cans of beer per week   • Drug use: No     Additional Health Issues to Consider:  DM and BP    Weight History:  Always been overweight    Previous Weight Loss Efforts:  Weight Watchers, Calorie counting, Low Fat, Carb Counting  Most Successful Weight Loss Effort:  Weight Watchers, Exercise    0-10 scale of importance of weight loss (0 not important to 10 very important): 10 due to about to lose ability to walk.    0-10 scale of ability to change (0 unable to change to 10 making changes):   7 made changes but " hard to keep them.     Eating Habits: Eat large portions, Eat out of boredom, Eat too fast  Eat three meals on most days?  No  Worst eating habit?  Eat large portions    How often do you eat fast food/eat out? weekly    Do you exercise regularly? (at least 3 times each week)  try to walk but had a fall    Occupation:  Retired     Personal Goal After Procedure:  Want to walk and be active    Personal Support:   and children    Plan:  Goals:   Eat & Drink Separately by next appointment at dinner meal(s), Drink 64 fl oz of water per day by next appointment and Change coffee and tea to decafe    PES: Undesirable food/beverage choices related to cultural practices as evidenced by drinking caffeine all day long.     Electronically signed by:  Ramona Alcala RD  01/21/20 11:13 AM

## 2020-01-22 VITALS
SYSTOLIC BLOOD PRESSURE: 124 MMHG | BODY MASS INDEX: 48.82 KG/M2 | DIASTOLIC BLOOD PRESSURE: 76 MMHG | HEIGHT: 65 IN | HEART RATE: 76 BPM | WEIGHT: 293 LBS

## 2020-02-11 ENCOUNTER — OFFICE VISIT (OUTPATIENT)
Dept: BARIATRICS/WEIGHT MGMT | Facility: CLINIC | Age: 55
End: 2020-02-11

## 2020-02-11 VITALS
HEART RATE: 77 BPM | SYSTOLIC BLOOD PRESSURE: 133 MMHG | DIASTOLIC BLOOD PRESSURE: 52 MMHG | HEIGHT: 65 IN | WEIGHT: 293 LBS | BODY MASS INDEX: 48.82 KG/M2

## 2020-02-11 DIAGNOSIS — E66.01 SUPER-SUPER OBESE (HCC): Primary | ICD-10-CM

## 2020-02-11 DIAGNOSIS — M06.00 SERONEGATIVE RHEUMATOID ARTHRITIS (HCC): ICD-10-CM

## 2020-02-11 DIAGNOSIS — M17.0 OSTEOARTHRITIS OF BOTH KNEES, UNSPECIFIED OSTEOARTHRITIS TYPE: ICD-10-CM

## 2020-02-11 DIAGNOSIS — Z79.899 LONG-TERM USE OF HIGH-RISK MEDICATION: ICD-10-CM

## 2020-02-11 NOTE — PROGRESS NOTES
[facility]  [address] [place] [address]  [address] [place] [address]  Dept: [phone]  2/11/2020      Nighat May.  [id number]  [id number]  1965  female      Chief Complaint   Patient presents with   • Obesity   • Nutrition Counseling   • Weight Loss       The patient is here for month SWL #2 of their pre-operative supervised weight loss visit. She had a loss of -5.4 lbs. Has partially met following goals: 1) Partially met goal for eating and drinking at dinner-80%, 2) Partially met goal of 64 fl oz of water- up to 48 fl oz, 3) Changed to decaffinated. Patient is working on eliminating fast foods, fried foods, sweets and soda.  Nighat May has been increasing her daily water intake. She has not been exercising: due to knee.    Patient states they have made positive changes including changed decaf, watching portions (smaller plate), drinking more water and lost weight.  The patient admits to be struggling with wanting to eat more (bigger portions).     Eat large portions    Review of Systems  Vitals:    02/11/20 1136   BP: 133/52   Pulse: 77     Patient Active Problem List   Diagnosis   • Carpal tunnel syndrome, bilateral upper limbs   • Chest pain   • Depression   • Diabetes mellitus (CMS/HCC)   • Elevated erythrocyte sedimentation rate   • Encounter for screening for osteoporosis   • Fatigue   • Fibromyalgia   • HTN (hypertension)   • Seronegative arthritis   • Inflammatory arthritis   • Joint pain   • Arthralgia of left wrist   • Body mass index 50.0-59.9, adult (CMS/HCC)   • Obesity   • Other long term (current) drug therapy   • Other specified disorders of bone density and structure, other site   • Rotator cuff tendonitis   • Urinary tract infection   • Vitamin D deficiency     Body mass index is 63.83 kg/m².  Documented weights    02/11/20 1136   Weight: (!) 174 kg (383 lb 9.6 oz)     Weight change from  last appointment:-5.4 lb   Weight change overall:-5.4 lb     Goals:  Eat & Drink Separately by next appointment at all meal(s), Drink 64 fl oz of water per day by next appointment and Finding a protein supplement that you like, Using band once daily,     Discussion/Plan:  Obesity/Morbid Obesity: Currently the patient's weight is decreasing. I reviewed the appropriate dietary choices with the patient and encouraged the necessary changes. Recommended at least 60 grams of protein per day, around 33 grams of fats and less than 100 grams of carbohydrates. Reviewed calorie intake if patient wanted to calorie count and/or had BMR. Instructed patient to drink 64 ounces of water per day and exercise a minimum of 150 minutes per week including both cardio and strength training.  Discussed with pt also eating and drinking separately stopping 30 minutes before meals and 45 minutes after meals. Discussed the option of keeping a food journal which will help patient become more aware of the nutritional value of foods so they are more prepared after surgery.    The patient was given written materials from our office for education.   I answered all of the patients questions regarding dietary changes, exercise or surgical options.  Future Appointments   Date Time Provider Department Center   4/21/2020 10:20 AM Nohemy Rodriguez MD [facility] None     The patient will follow up in one month on.    PES: Not ready for lifestyle change related to lack of self-efficacy for making change as evidenced by has not started exercise routine.     The total time spent face to face was 20 minutes with 20 minutes spent counseling.

## 2020-02-11 NOTE — PATIENT INSTRUCTIONS
Eat & Drink Separately by next appointment at all meal(s), Drink 64 fl oz of water per day by next appointment and Finding a protein supplement that you like, and Using band once daily.

## 2020-02-20 ENCOUNTER — TELEPHONE (OUTPATIENT)
Dept: RHEUMATOLOGY | Facility: CLINIC | Age: 55
End: 2020-02-20

## 2020-02-20 DIAGNOSIS — M25.561 PAIN IN BOTH KNEES, UNSPECIFIED CHRONICITY: ICD-10-CM

## 2020-02-20 DIAGNOSIS — M25.562 PAIN IN BOTH KNEES, UNSPECIFIED CHRONICITY: ICD-10-CM

## 2020-02-20 DIAGNOSIS — M17.0 OSTEOARTHRITIS OF BOTH KNEES, UNSPECIFIED OSTEOARTHRITIS TYPE: Primary | ICD-10-CM

## 2020-02-20 DIAGNOSIS — R93.6 ABNORMAL X-RAY OF KNEE: ICD-10-CM

## 2020-02-20 NOTE — TELEPHONE ENCOUNTER
He is sent an order for an MRI of the Knees, worsening knee pain. Abnormal X-rays of the knees in patient with BMI of 63

## 2020-02-24 RX ORDER — SYRINGE-NEEDLE,INSULIN,0.5 ML 28GX1/2"
SYRINGE, EMPTY DISPOSABLE MISCELLANEOUS
Qty: 100 EACH | Refills: 0 | Status: SHIPPED | OUTPATIENT
Start: 2020-02-24 | End: 2020-03-24 | Stop reason: SDUPTHER

## 2020-02-25 NOTE — TELEPHONE ENCOUNTER
MRI bilateral knees - Called [employer] to do verbal precertification.  Does not meet [employer] guidelines with diagnoses given.  Will submit additional information to clinical review and wait for response.  [employer] case# [id number].

## 2020-03-16 RX ORDER — METHOTREXATE 25 MG/ML
15 INJECTION, SOLUTION INTRA-ARTERIAL; INTRAMUSCULAR; INTRAVENOUS WEEKLY
Qty: 12 ML | Refills: 0 | Status: SHIPPED | OUTPATIENT
Start: 2020-03-16 | End: 2020-05-20 | Stop reason: SDUPTHER

## 2020-03-17 ENCOUNTER — OFFICE VISIT (OUTPATIENT)
Dept: PSYCHIATRY | Facility: CLINIC | Age: 55
End: 2020-03-17

## 2020-03-17 DIAGNOSIS — E66.01 MORBID OBESITY (HCC): ICD-10-CM

## 2020-03-17 DIAGNOSIS — Z01.818 PRE-OPERATIVE EXAMINATION: ICD-10-CM

## 2020-03-17 PROCEDURE — 90791 PSYCH DIAGNOSTIC EVALUATION: CPT | Performed by: PSYCHIATRY & NEUROLOGY

## 2020-03-17 NOTE — PATIENT INSTRUCTIONS
Bariatric Surgery Information  Bariatric surgery, also called weight loss surgery, is a procedure that helps you lose weight. You may consider, or your health care provider may suggest, bariatric surgery if:  · You are severely obese and have been unable to lose weight through diet and exercise.  · You have health problems related to obesity, such as:  ? Type 2 diabetes.  ? Heart disease.  ? Lung disease.  How does bariatric surgery help me lose weight?  Bariatric surgery helps you lose weight by:  · Decreasing how much food your body absorbs. This is done by closing off part of your stomach to make it smaller. This restricts the amount of food your stomach can hold.  · Changing your body’s regular digestive process so that food bypasses the parts of your body that absorb calories and nutrients.  If you decide to have bariatric surgery, it is important to continue to eat a healthy diet and exercise regularly after the surgery.  What are the different kinds of bariatric surgery?  There are two kinds of bariatric surgeries:  · Restrictive surgery. This procedure makes your stomach smaller. It does not change your digestive process. The smaller the size of your new stomach, the less food you can eat. There are different types of restrictive surgeries.  · Malabsorptive surgery. This procedure makes your stomach smaller and alters your digestive process so that your body processes less calories and nutrients. These are the most common kind of bariatric surgery. There are different types of malabsorptive surgeries.  What are the different types of restrictive surgery?  Adjustable Gastric Banding  In this procedure, an inflatable band is placed around your stomach near the upper end. This makes the passageway for food into the rest of your stomach much smaller. The band can be adjusted, making it tighter or looser, by filling it with salt solution. Your surgeon can adjust the band based on how you are feeling and how much  weight you are losing. The band can be removed in the future. This requires another surgery.  Vertical Banded Gastroplasty  In this procedure, staples are used to separate your stomach into two parts, a small upper pouch and a bigger lower pouch. This decreases how much food you can eat.  Sleeve Gastrectomy  In this procedure, your stomach is made smaller. This is done by surgically removing a large part of your stomach. When your stomach is smaller, you feel full more quickly and reduce how much you eat.  What are the different types of malabsorptive surgery?    Karissa-en-Y Gastric Bypass (RGB)  This is the most common weight loss surgery. In this procedure, a small stomach pouch is created in the upper part of your stomach. Next, this small stomach pouch is attached directly to the middle part of your small intestine. The farther down your small intestine the new connection is made, the fewer calories and nutrients you will absorb. This surgery has the highest rate of complications.  Biliopancreatic Diversion with Duodenal Switch (BPD/DS)  This is a multi-step procedure. First, a large part of your stomach is removed, making your stomach smaller. Next, this smaller stomach is attached to the lower part of your small intestine. Like the RGB surgery, you absorb fewer calories and nutrients the farther down your small intestine the attachment is made.  What are the risks of bariatric surgery?  As with any surgical procedure, each type of bariatric surgery has its own risks. These risks also depend on your age, your overall health, and any other medical conditions you may have. When deciding on bariatric surgery, it is very important to:  · Talk to your health care provider and choose the surgery that is best for you.  · Ask your health care provider about specific risks for the surgery you choose.  Generally, the risks of bariatric surgery include:  · Infection.  · Bleeding.  · Not getting enough nutrients from food  (nutritional deficiencies).  · Failure of the device or procedure. This may require another surgery to correct the problem.  Where to find more information  · American Society for Metabolic & Bariatric Surgery: www.asmbs.org  · Weight-control Information Network (WIN): win.niddk.nih.gov  Summary  · Bariatric surgery, also called weight loss surgery, is a procedure that helps you lose weight.  · This surgery may be recommended if you have diabetes, heart disease, or lung disease.  · Generally, risks of bariatric surgery include infection, bleeding, and failure of the surgery or device, which may require another surgery to correct the problem.  This information is not intended to replace advice given to you by your health care provider. Make sure you discuss any questions you have with your health care provider.  Document Released: 12/18/2006 Document Revised: 01/22/2018 Document Reviewed: 01/22/2018  [employer] Interactive Patient Education © 2020 [employer]
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
## 2020-03-24 ENCOUNTER — TELEPHONE (OUTPATIENT)
Dept: RHEUMATOLOGY | Facility: CLINIC | Age: 55
End: 2020-03-24

## 2020-03-24 RX ORDER — SYRINGE-NEEDLE,INSULIN,0.5 ML 28GX1/2"
SYRINGE, EMPTY DISPOSABLE MISCELLANEOUS
Qty: 12 EACH | Refills: 0 | Status: SHIPPED | OUTPATIENT
Start: 2020-03-24 | End: 2020-05-20 | Stop reason: SDUPTHER

## 2020-03-24 NOTE — TELEPHONE ENCOUNTER
----- Message from Nighat May sent at 3/24/2020  3:14 AM EDT -----  Regarding: Prescription Question  Contact: [phone]  Why were syringes denied?  How am I supposed to take the Methotrexate injection once a week.

## 2020-04-21 ENCOUNTER — OFFICE VISIT (OUTPATIENT)
Dept: RHEUMATOLOGY | Facility: CLINIC | Age: 55
End: 2020-04-21

## 2020-04-21 VITALS — BODY MASS INDEX: 63.07 KG/M2 | WEIGHT: 293 LBS

## 2020-04-21 DIAGNOSIS — M79.7 FIBROMYALGIA: ICD-10-CM

## 2020-04-21 DIAGNOSIS — M17.12 OSTEOARTHRITIS OF LEFT KNEE, UNSPECIFIED OSTEOARTHRITIS TYPE: ICD-10-CM

## 2020-04-21 DIAGNOSIS — M19.90 INFLAMMATORY ARTHRITIS: Primary | ICD-10-CM

## 2020-04-21 DIAGNOSIS — Z79.899 LONG-TERM USE OF HIGH-RISK MEDICATION: ICD-10-CM

## 2020-04-21 PROCEDURE — 99441 PR PHYS/QHP TELEPHONE EVALUATION 5-10 MIN: CPT | Performed by: INTERNAL MEDICINE

## 2020-04-21 NOTE — PROGRESS NOTES
You have chosen to receive care through a telephone visit. Do you consent to use a telephone visit for your medical care today? Yes    HPI:  The patient is a 55-year-old female who suffers from inflammatory arthritis he was seen in the office January 21, 2020.  Her treatment consists of methotrexate 50 mg subcutaneously once a week.  At the time of the last visit, the patient has been off of Humira since the beginning of the year and she did not notice any major changes in her clinical condition.  At that time, decided to continue with methotrexate only.  She is also known to suffer from fibromyalgia syndrome and takes Cymbalta 60 mg p.o. a day regularly.    Today, she claims to be doing fairly well, most of her symptoms today come from pain in her lower back and also her left knee.  An MRI was approved for the right knee and not for the left.  She was going to contact the insurance company to change her to prior to the left knee but due to the coronavirus pandemia, she is not willing to have the MRI at this time.  She claims that her left knee pops and sometimes gives away.  She feels stiff in the morning especially in her hands but the symptoms resolved after only a few minutes, she has not noticed joint swelling.    She had laboratories done at her PCPs office about a week ago.  Results are not available.    The patient was in close contact with her daughter-in-law who gently tested positive for COVID-19.  She denies so far, fever, chills, cough, shortness of breath more than usual, no nausea, vomiting or diarrhea.  All other systems reviewed and they were negative.        Rheumatologic history:     1. Inflammatory arthritis diagnosed/Sero (-) RA 04/2018  Sulfasalazine started 04/2018  MTX started 07/2018--dose decreased due to flu like symptoms and hair loss  -  Humira recommended 05/2019, samples provided, 1st dose given 05/20/2019     Vectra DA April 2018 50     2.  FMS  Cymbalta started 04/2018.     3.  "Bilateral  carpal tunnel syndrome             Past Medical History:   Diagnosis Date   • Depression    • DM type 2 (diabetes mellitus, type 2) (CMS/HCC)    • Fibromyalgia, primary    • Rheumatoid arthritis (CMS/HCC)        Current Outpatient Medications   Medication Sig Dispense Refill   • amLODIPine (NORVASC) 5 MG tablet Take 5 mg by mouth Daily.  0   • B-D INSULIN SYRINGE 1CC/25GX1\" 25G X 1\" 1 ML misc USE AS DIRECTED WITH METHOTREXATE VIAL  3   • BREO ELLIPTA 200-25 MCG/INH inhaler Inhale 1 puff Daily.  5   • DULoxetine (CYMBALTA) 60 MG capsule Take 1 capsule by mouth Daily. 90 capsule 0   • famotidine (PEPCID) 20 MG tablet Take 20 mg by mouth 2 (Two) Times a Day.  2   • fluticasone (FLONASE) 50 MCG/ACT nasal spray FLONASE 50 MCG/ACT NASAL SUSPENSION     • folic acid (FOLVITE) 1 MG tablet TAKE 1 TABLET BY MOUTH EVERY DAY 90 tablet 1   • hydroCHLOROthiazide (MICROZIDE) 12.5 MG capsule Take 12.5 mg by mouth Daily.  1   • losartan (COZAAR) 100 MG tablet Take 100 mg by mouth Daily.  2   • Methotrexate Sodium 50 MG/2ML injection Inject 0.6 mL under the skin into the appropriate area as directed 1 (One) Time Per Week. 12 mL 0   • metoprolol succinate XL (TOPROL-XL) 50 MG 24 hr tablet Take 50 mg by mouth Daily.  1   • OZEMPIC, 0.25 OR 0.5 MG/DOSE, 2 MG/1.5ML solution pen-injector      • pioglitazone (ACTOS) 15 MG tablet Take 15 mg by mouth Daily.  1   • TRADJENTA 5 MG tablet tablet Take 5 mg by mouth Daily.  0   • Tuberculin Syringe (B-D TB SYRINGE 1CC/25GX5/8\") 25G X 5/8\" 1 ML misc As directed with methotrexate 12 each 0   • HUMIRA PEN 40 MG/0.4ML Pen-injector Kit INJECT 1 PEN UNDER THE SKIN EVERY 14 DAYS. 6 each 0   • HYDROcodone-acetaminophen (NORCO) 7.5-325 MG per tablet TAKE 1 TO 2 TABLETS BY MOUTH EVERY 4 TO 6 HOURS AS NEEDED  0   • Methotrexate Sodium (METHOTREXATE PF) 50 MG/2ML chemo syringe INJECT 20MG. SUB-Q EVERY WEEK (Patient taking differently: INJECT 15MG. SUB-Q EVERY WEEK) 2 mL 6   • pravastatin " (PRAVACHOL) 10 MG tablet Take 10 mg by mouth Daily.     • sitaGLIPtin-metFORMIN (JANUMET)  MG per tablet Take 1 tablet by mouth.       No current facility-administered medications for this visit.        Physical exam:    There were no vitals filed for this visit.     Assessment:  1.  Inflammatory arthritis.  Unchanged, I will continue with methotrexate 50 mg subcutaneously once a week plus folic acid 1 mg p.o. daily.    2.  Long-term high-risk medications, the patient was recommended to keep her today with her regular age-appropriate cancer screening and vaccinations.        Cancer screening:  Colonoscopy  NO  ;PAP  Dec 2017;  Mammogram; Jan 2018  Bone health: calcium and vitamin D: YES, DXA scan; 11/2019  Vaccines: Flu: 2019  ;PNA13: Dec 2018  ;Zoster: NO  X-rays of the chest; 10/2019 vane Hands;  4/11/18 Feet: NO  Hepatitis panel, HIV, QTB/PPD:  6/26/18 negative    3.  Left knee pain.  MRI still pending.    4.  Fibromyalgia syndrome.  Continue with Cymbalta with no changes.    5.  BMI of 50.  She was recommended to engage in daily exercise and try to lose weight.    Plan:  Continue with methotrexate subcutaneously 15 mg once a week  Continue with folic acid 1 mg p.o. daily  Weight loss through healthy diet and exercise  We will contact insurance to have the MRI of the left knee approved  Continue with Cymbalta 60 mg 1 tablet p.o. daily  Laboratories to be done.  Orders will be mailed to the patient  RTC 3 months or sooner if needed.    Approximately 15 minutes were spent with the patient reviewing symptoms, medications, plan of care.    Orders Placed This Encounter   Procedures   • Comprehensive Metabolic Panel     Standing Status:   Future   • C-reactive Protein     Standing Status:   Future   • Sedimentation Rate     Standing Status:   Future   • Comprehensive Metabolic Panel     Standing Status:   Future     Standing Expiration Date:   4/21/2021   • C-reactive Protein     Standing Status:   Future      Standing Expiration Date:   4/21/2021   • Sedimentation Rate     Standing Status:   Future     Standing Expiration Date:   4/21/2021   • CBC With Manual Differential     Standing Status:   Future   • CBC With Manual Differential     Standing Status:   Future     Standing Expiration Date:   4/21/2021

## 2020-04-27 DIAGNOSIS — M06.00 SERONEGATIVE RHEUMATOID ARTHRITIS (HCC): ICD-10-CM

## 2020-04-28 ENCOUNTER — OFFICE VISIT (OUTPATIENT)
Dept: BARIATRICS/WEIGHT MGMT | Facility: CLINIC | Age: 55
End: 2020-04-28

## 2020-04-28 VITALS — HEIGHT: 65 IN | BODY MASS INDEX: 48.82 KG/M2 | WEIGHT: 293 LBS

## 2020-04-28 DIAGNOSIS — E66.01 SUPER-SUPER OBESE (HCC): Primary | ICD-10-CM

## 2020-04-28 NOTE — PROGRESS NOTES
[facility]  [address] [place] [address]  [address] [place] [address]  Dept: [phone]  4/28/2020      Nighat May.  [id number]  [id number]  1965  female      No chief complaint on file.    Consent to SWL via phone.    The patient is here for month SWL #4 of their pre-operative supervised weight loss visit. She had a loss of 3.2 lbs. Has partially met following goals: 1) Exercise partially met-yard work, 2) Met weight loss goal-3 lbs. Patient is working on eliminating fast foods, fried foods, sweets and soda.  Nighat May has been increasing her daily water intake. She has been exercising: yard work.    Patient states they have made positive changes including getting outside.  The patient admits to be struggling with mobility.     none    Breakfast: protein shake  Lunch: [place] or leftovers  Dinner: Spaghetti   Snack: none  Drinks: 2 cups of coffee in the morning and evening, water.     Review of Systems  There were no vitals filed for this visit.  Patient Active Problem List   Diagnosis   • Carpal tunnel syndrome, bilateral upper limbs   • Chest pain   • Depression   • Diabetes mellitus (CMS/HCC)   • Elevated erythrocyte sedimentation rate   • Encounter for screening for osteoporosis   • Fatigue   • Fibromyalgia   • HTN (hypertension)   • Seronegative arthritis   • Inflammatory arthritis   • Joint pain   • Arthralgia of left wrist   • Body mass index 50.0-59.9, adult (CMS/HCC)   • Obesity   • Other long term (current) drug therapy   • Other specified disorders of bone density and structure, other site   • Rotator cuff tendonitis   • Urinary tract infection   • Vitamin D deficiency   • Morbid obesity (CMS/HCC)   • Pre-operative examination     Body mass index is 62.77 kg/m².  Documented weights    04/28/20 1305   Weight: (!) 171 kg (377 lb 3.2 oz)     Weight change from last appointment:-3.2 lb    Weight  change overall:-11.8 lb     Goals:  Weight loss goal Eat off smaller plate by next appointment.  by next appointment, Chew food 15-20 x per bite by next appointment and Eat off smaller plate by next appointment.     Discussion/Plan:  Obesity/Morbid Obesity: Currently the patient's weight is decreasing. I reviewed the appropriate dietary choices with the patient and encouraged the necessary changes. Recommended at least 60 grams of protein per day, around 33 grams of fats and less than 100 grams of carbohydrates. Reviewed calorie intake if patient wanted to calorie count and/or had BMR. Instructed patient to drink 64 ounces of water per day and exercise a minimum of 150 minutes per week including both cardio and strength training.  Discussed with pt also eating and drinking separately stopping 30 minutes before meals and 45 minutes after meals. Discussed the option of keeping a food journal which will help patient become more aware of the nutritional value of foods so they are more prepared after surgery.    The patient was given written materials from our office for education.   I answered all of the patients questions regarding dietary changes, exercise or surgical options.  Future Appointments   Date Time Provider Department Center   5/12/2020  1:00 PM Tuyet Neely MD [facility] None     The patient will follow up in one month on.    PES:     The total time spent face to face was 20 minutes with 20 minutes spent counseling.

## 2020-04-28 NOTE — PATIENT INSTRUCTIONS
Weight loss goal Eat off smaller plate by next appointment.  by next appointment, Chew food 15-20 x per bite by next appointment and Eat off smaller plate by next appointment.

## 2020-05-07 RX ORDER — DULOXETIN HYDROCHLORIDE 60 MG/1
CAPSULE, DELAYED RELEASE ORAL
Qty: 90 CAPSULE | Refills: 0 | Status: SHIPPED | OUTPATIENT
Start: 2020-05-07 | End: 2021-07-15

## 2020-05-13 ENCOUNTER — TELEPHONE (OUTPATIENT)
Dept: CARDIOLOGY | Facility: CLINIC | Age: 55
End: 2020-05-13

## 2020-05-19 ENCOUNTER — OFFICE VISIT (OUTPATIENT)
Dept: BARIATRICS/WEIGHT MGMT | Facility: CLINIC | Age: 55
End: 2020-05-19

## 2020-05-19 VITALS — BODY MASS INDEX: 48.82 KG/M2 | WEIGHT: 293 LBS | HEIGHT: 65 IN

## 2020-05-19 DIAGNOSIS — E66.9 SUPER OBESE: Primary | ICD-10-CM

## 2020-05-19 PROCEDURE — 99443 PR PHYS/QHP TELEPHONE EVALUATION 21-30 MIN: CPT | Performed by: SURGERY

## 2020-05-19 NOTE — PROGRESS NOTES
[facility]  [address] [place] [address]  [address] [place] [address]  Dept: [phone]  5/19/2020      Nighat May.  [id number]  [id number]  1965  female      Chief Complaint   Patient presents with   • Obesity   • Nutrition Counseling   • Weight Loss       The patient is here for month SWL #5 of their pre-operative supervised weight loss visit. She had a loss of -2.4 lbs. Has partially met following goals: 1) Eating off a smaller plate 50% of the time, 2) Chewing 15-20x goal met. Patient is working on eliminating fast foods, fried foods, sweets and soda.  Nighat May has been increasing her daily water intake. She has been exercising: more yard work.    Patient states they have made positive changes including weight loss and able to be more mobile.   The patient admits to be struggling with portion.     Eat large portions     Breakfast: skip  Lunch: shakes  Dinner: Lasagna   Drink: coffee/4, water, 1/2 tea  Snacks: none    Review of Systems  There were no vitals filed for this visit.  Patient Active Problem List   Diagnosis   • Carpal tunnel syndrome, bilateral upper limbs   • Chest pain   • Depression   • Diabetes mellitus (CMS/HCC)   • Elevated erythrocyte sedimentation rate   • Encounter for screening for osteoporosis   • Fatigue   • Fibromyalgia   • HTN (hypertension)   • Seronegative arthritis   • Inflammatory arthritis   • Joint pain   • Arthralgia of left wrist   • Body mass index 50.0-59.9, adult (CMS/HCC)   • Obesity   • Other long term (current) drug therapy   • Other specified disorders of bone density and structure, other site   • Rotator cuff tendonitis   • Urinary tract infection   • Vitamin D deficiency   • Morbid obesity (CMS/HCC)   • Pre-operative examination     Body mass index is 62.37 kg/m².  Documented weights    05/19/20 0952   Weight: (!) 170 kg (374 lb 12.8 oz)     Weight change from  last appointment:-2.4 lb   Weight change overall:-14.2 lb     Goals:  Weight loss goal 1-2 lb by next appointment and eat off smaller plate.     Discussion/Plan:  Obesity/Morbid Obesity: Currently the patient's weight is decreasing. I reviewed the appropriate dietary choices with the patient and encouraged the necessary changes. Recommended at least 60 grams of protein per day, around 33 grams of fats and less than 100 grams of carbohydrates. Reviewed calorie intake if patient wanted to calorie count and/or had BMR. Instructed patient to drink 64 ounces of water per day and exercise a minimum of 150 minutes per week including both cardio and strength training.  Discussed with pt also eating and drinking separately stopping 30 minutes before meals and 45 minutes after meals. Discussed the option of keeping a food journal which will help patient become more aware of the nutritional value of foods so they are more prepared after surgery.    The patient was given written materials from our office for education.   I answered all of the patients questions regarding dietary changes, exercise or surgical options.  Future Appointments   Date Time Provider Department Center   5/19/2020  1:00 PM Tuyet Neely MD [facility] None   5/21/2020  3:00 PM Umer Pandya MD [facility]     The patient will follow up in one month on.    PES:     The total time spent face to face was 20 minutes with 20 minutes spent counseling.

## 2020-05-19 NOTE — PROGRESS NOTES
[facility]  [address] [place] [address]  [address] [place] [address]  Dept: [phone]  5/19/2020      Nighat May.  [id number]  [id number]  1965  female  You have chosen to receive care through a telephone visit. Do you consent to use a telephone visit for your medical care today? Yes      Chief Complaint of weight gain; unable to maintain weight loss    History of Present Illness:   Nighat is a 55 y.o. female who presents today for evaluation, education and consultation regarding weight loss surgery. The patient is interested in the sleeve gastrectomy. Her weight is 374.8. BMI is 62.8. She has lost 100 lbs before and got off her DM meds. She gained the weight back.      She sleeps 6-8 hours a night. She is on BIPAP.    Diet History:See dietician/RN/MA documentation for complete history of weight and diet. She has 3 meals a day, she does not eat much breakfast but will have lunch and dinner and a late meal. She stays up until 1-2 am.     Bariatric Surgery Evaluation: The patient is being seen for an initial visit for bariatric surgery evaluation.       Patient Active Problem List   Diagnosis   • Carpal tunnel syndrome, bilateral upper limbs   • Chest pain   • Depression   • Diabetes mellitus (CMS/HCC)   • Elevated erythrocyte sedimentation rate   • Encounter for screening for osteoporosis   • Fatigue   • Fibromyalgia   • HTN (hypertension)   • Seronegative arthritis   • Inflammatory arthritis   • Joint pain   • Arthralgia of left wrist   • Body mass index 50.0-59.9, adult (CMS/HCC)   • Obesity   • Other long term (current) drug therapy   • Other specified disorders of bone density and structure, other site   • Rotator cuff tendonitis   • Urinary tract infection   • Vitamin D deficiency   • Morbid obesity (CMS/HCC)   • Pre-operative examination       Past Medical History:   Diagnosis Date   • Depression    • DM  "type 2 (diabetes mellitus, type 2) (CMS/HCC)    • Fibromyalgia, primary    • Rheumatoid arthritis (CMS/HCC)        Past Surgical History:   Procedure Laterality Date   •  SECTION      x2   • HAND SURGERY      UCL repair right thumb   • TUBAL ABDOMINAL LIGATION         Allergies   Allergen Reactions   • Lisinopril Unknown (See Comments)     Fluid retention and cough           Current Outpatient Medications:   •  amLODIPine (NORVASC) 5 MG tablet, Take 5 mg by mouth Daily., Disp: , Rfl: 0  •  B-D INSULIN SYRINGE 1CC/25GX1\" 25G X 1\" 1 ML misc, USE AS DIRECTED WITH METHOTREXATE VIAL, Disp: , Rfl: 3  •  BREO ELLIPTA 200-25 MCG/INH inhaler, Inhale 1 puff Daily., Disp: , Rfl: 5  •  DULoxetine (CYMBALTA) 60 MG capsule, TAKE 1 CAPSULE BY MOUTH EVERY DAY, Disp: 90 capsule, Rfl: 0  •  famotidine (PEPCID) 20 MG tablet, Take 20 mg by mouth 2 (Two) Times a Day., Disp: , Rfl: 2  •  fluticasone (FLONASE) 50 MCG/ACT nasal spray, FLONASE 50 MCG/ACT NASAL SUSPENSION, Disp: , Rfl:   •  folic acid (FOLVITE) 1 MG tablet, TAKE 1 TABLET BY MOUTH EVERY DAY, Disp: 90 tablet, Rfl: 1  •  HUMIRA PEN 40 MG/0.4ML Pen-injector Kit, INJECT 1 PEN UNDER THE SKIN EVERY 14 DAYS., Disp: 6 each, Rfl: 0  •  hydroCHLOROthiazide (MICROZIDE) 12.5 MG capsule, Take 12.5 mg by mouth Daily., Disp: , Rfl: 1  •  HYDROcodone-acetaminophen (NORCO) 7.5-325 MG per tablet, TAKE 1 TO 2 TABLETS BY MOUTH EVERY 4 TO 6 HOURS AS NEEDED, Disp: , Rfl: 0  •  losartan (COZAAR) 100 MG tablet, Take 100 mg by mouth Daily., Disp: , Rfl: 2  •  Methotrexate Sodium (METHOTREXATE PF) 50 MG/2ML chemo syringe, INJECT 20MG. SUB-Q EVERY WEEK (Patient taking differently: INJECT 15MG. SUB-Q EVERY WEEK), Disp: 2 mL, Rfl: 6  •  Methotrexate Sodium 50 MG/2ML injection, Inject 0.6 mL under the skin into the appropriate area as directed 1 (One) Time Per Week., Disp: 12 mL, Rfl: 0  •  metoprolol succinate XL (TOPROL-XL) 50 MG 24 hr tablet, Take 50 mg by mouth Daily., Disp: , Rfl: 1  •  " "OZEMPIC, 0.25 OR 0.5 MG/DOSE, 2 MG/1.5ML solution pen-injector, , Disp: , Rfl:   •  pioglitazone (ACTOS) 15 MG tablet, Take 15 mg by mouth Daily., Disp: , Rfl: 1  •  pravastatin (PRAVACHOL) 10 MG tablet, Take 10 mg by mouth Daily., Disp: , Rfl:   •  sitaGLIPtin-metFORMIN (JANUMET)  MG per tablet, Take 1 tablet by mouth., Disp: , Rfl:   •  TRADJENTA 5 MG tablet tablet, Take 5 mg by mouth Daily., Disp: , Rfl: 0  •  Tuberculin Syringe (B-D TB SYRINGE 1CC/25GX5/8\") 25G X 5/8\" 1 ML misc, As directed with methotrexate, Disp: 12 each, Rfl: 0    Social History     Socioeconomic History   • Marital status:      Spouse name: Pepito May   • Number of children: 2   • Years of education: Not on file   • Highest education level: Not on file   Tobacco Use   • Smoking status: Former Smoker     Types: Electronic Cigarette, Cigarettes   • Smokeless tobacco: Never Used   • Tobacco comment: vape, less than half   Substance and Sexual Activity   • Alcohol use: Yes     Alcohol/week: 2.0 standard drinks     Types: 2 Cans of beer per week   • Drug use: No   • Sexual activity: Defer       Family History   Problem Relation Age of Onset   • Diabetes Mother    • COPD Mother    • Heart disease Mother    • No Known Problems Father          Review of Systems:  Review of Systems    Physical Exam:  Vital Signs:                  Physical Exam       Assessment:         Nighat May is a 55 y.o. year old female with medically complicated severe obesity.  , There is no height or weight on file to calculate BMI. and weight related problems.    I explained in detail the procedures that we are performing.  All of those procedures can be performed laparoscopically but there is a chance to convert to open if any technical challenges or complications do occur.  Bariatric surgery is not cosmetic surgery but rather a tool to help a patient make a life-long commitment lifestyle changes including diet, exercise, behavior changes, and taking " supplemental vitamins and minerals.    Due to the patient's BMI and co-morbidities they are at a high risk for surgery and will obtain the following:  The patient has been advised that a letter of medical support and a history and physical must be obtained from her primary care physician. A psychological evaluation will be arranged for this patient. CBC, CMP, FLP, TSH and HgbA1C will be drawn. Nighat May will obtain a pre-operative CXR and EKG. Nighat May will obtain clearance from a cardiologist prior to surgery.     Nighat May will be set up for a pre-operative diagnostic esophagogastroduodenoscopy with biopsy for evaluation. The risks and benefits of the procedure were discussed with the patient in detail and all questions were answered.  Possibility of perforation, bleeding, aspiration, anoxic brain injury, respiratory and/or cardiac arrest and death were discussed.   She received handouts regarding, all questions were answered and informed consent was obtained.     The risks, benefits, alternatives, and potential complications of all of the procedures were explained in detail including, but not limited to death, anesthesia and medication adverse effect/DVT, pulmonary embolism, trocar site/incisional hernia, wound infection, abdominal infection, bleeding, failure to lose weight or gain weight and change in body image, metabolic complications with calcium, thiamine, vitamin B12, folate, iron, and anemia.    The patient was advised to start a high protein, low fat and low carbohydrate diet. The patient was given individualized information by our dietician along with general group information and handouts.     The patient was given information regarding the MARTIN educational video. MARTIN is an internet based educational video which explains the surgical procedure and answers basic questions regarding the procedure. The patient was provided with instructions and a password to watch the video.    The patient  was encouraged to start routine exercise including but not limited to 150 minutes per week. The patient received a resistance band along with a handout of exercises.     The consultation plan was reviewed with the patient.    The patient understands the surgical procedures and the different surgical options that are available.  She understands the lifestyle changes that would be required after surgery and has agreed to participate in a pre-operative and postoperative weight management program.  She also expressed understanding of possible risks, had several questions answered and desires to proceed.    I think she is a good candidate for this surgery, and is interested in a sleeve gastrectomy.    No diagnosis found.    Plan:    Patient will have evaluations and follow up with bariatric dieticians and a psychologist before undergoing a multidisciplinary review of her candidacy.  We also discussed the weight loss requirement and rationale, and other program requirements.    This visit has been rescheduled as a phone visit to comply with patient safety concerns in accordance with CDC recommendations. Total time of discussion was 25 minutes.      Tuyet Neely MD  5/19/2020

## 2020-05-20 RX ORDER — ADALIMUMAB 40MG/0.8ML
KIT SUBCUTANEOUS
COMMUNITY
End: 2020-10-08

## 2020-05-21 ENCOUNTER — OFFICE VISIT (OUTPATIENT)
Dept: CARDIOLOGY | Facility: CLINIC | Age: 55
End: 2020-05-21

## 2020-05-21 VITALS
OXYGEN SATURATION: 94 % | SYSTOLIC BLOOD PRESSURE: 142 MMHG | DIASTOLIC BLOOD PRESSURE: 77 MMHG | WEIGHT: 293 LBS | BODY MASS INDEX: 63.9 KG/M2 | HEART RATE: 71 BPM

## 2020-05-21 DIAGNOSIS — E11.9 TYPE 2 DIABETES MELLITUS WITHOUT COMPLICATION, WITHOUT LONG-TERM CURRENT USE OF INSULIN (HCC): ICD-10-CM

## 2020-05-21 DIAGNOSIS — Z82.49 FAMILY HISTORY OF PREMATURE CAD: ICD-10-CM

## 2020-05-21 DIAGNOSIS — R60.0 EDEMA LEG: ICD-10-CM

## 2020-05-21 DIAGNOSIS — E66.01 MORBID OBESITY WITH BODY MASS INDEX (BMI) OF 60.0 TO 69.9 IN ADULT (HCC): ICD-10-CM

## 2020-05-21 DIAGNOSIS — R06.09 DYSPNEA ON EXERTION: Primary | ICD-10-CM

## 2020-05-21 DIAGNOSIS — I10 ESSENTIAL HYPERTENSION: ICD-10-CM

## 2020-05-21 PROCEDURE — 99204 OFFICE O/P NEW MOD 45 MIN: CPT | Performed by: INTERNAL MEDICINE

## 2020-05-21 PROCEDURE — 93000 ELECTROCARDIOGRAM COMPLETE: CPT | Performed by: INTERNAL MEDICINE

## 2020-05-21 NOTE — PROGRESS NOTES
Cardiology Consult Note    Patient Identification:  Name: Nighat May  Age: 55 y.o.  Sex: female  :  1965  MRN: [id number]             Requesting Physician : Dr. Neely    Reason for Consultation / Chief Complaint : patient co-management preoperative cardiovascular evaluation for bariatric surgery    History of Present Illness:      This is a 55-year-old with PMH of    -Type 2 diabetes  -Morbid obesity  -Hypertension  -Dyslipidemia  -Rheumatoid arthritis  -, tubal ligation, hand surgery  -Former smoker, intermittently smokes  -Positive family history of premature CAD mother  at 59 with MI    Here for preoperative cardiovascular evaluation for bariatric surgery.  She has been noticing decrease in stamina and dyspnea on exertion which is relieved with rest and the recent too distant past.  Denies any chest pain lightheadedness dizziness loss of consciousness.  Review of labs from 2020 revealed normal CMP and CBC.  Patient's arterial blood pressure is 142/77, heart rate 71, O2 sat of 94% on room air.  BMI is over 63.    Assessment:      -Dyspnea on exertion consistent with anginal equivalent in a diabetic patient  -Preoperative cardiovascular evaluation for bariatric surgery,inactive patient  -Diabetes, hypertension, dyslipidemia  -Morbid obesity  -Positive family for premature CAD  -Cigarette smoker    Recommendations / Plan:        Reviewed EKG results with patient  Counseled on smoking cessation diet exercise and weight loss  We will schedule an echo and stress test.  Patient says she cannot walk due to deconditioning and dyspnea on exertion.  Will schedule Lexiscan Cardiolite.  Patient's likelihood of having CAD is intermediate to high given her multiple risk factors.  We will follow-up after stress testing and consider further evaluation and treatment.           Diagnosis Plan   1. Dyspnea on exertion  Adult Transthoracic Echo Complete W/ Cont if Necessary Per Protocol    Stress Test  With Myocardial Perfusion One Day   2. Edema leg  Adult Transthoracic Echo Complete W/ Cont if Necessary Per Protocol    Stress Test With Myocardial Perfusion One Day   3. Morbid obesity with body mass index (BMI) of 60.0 to 69.9 in adult (CMS/[facility])  Adult Transthoracic Echo Complete W/ Cont if Necessary Per Protocol    Stress Test With Myocardial Perfusion One Day   4. Essential hypertension  Adult Transthoracic Echo Complete W/ Cont if Necessary Per Protocol    Stress Test With Myocardial Perfusion One Day   5. Type 2 diabetes mellitus without complication, without long-term current use of insulin (CMS/HCC)  Adult Transthoracic Echo Complete W/ Cont if Necessary Per Protocol    Stress Test With Myocardial Perfusion One Day   6. Family history of premature CAD  Adult Transthoracic Echo Complete W/ Cont if Necessary Per Protocol    Stress Test With Myocardial Perfusion One Day              Past Medical History:  Past Medical History:   Diagnosis Date   • Asthma     exercise induced   • Depression    • DM type 2 (diabetes mellitus, type 2) (CMS/[facility])    • Fibromyalgia, primary    • Hypertension    • Rheumatoid arthritis (CMS/[facility])      Past Surgical History:  Past Surgical History:   Procedure Laterality Date   •  SECTION      x2   • HAND SURGERY      UCL repair right thumb   • TUBAL ABDOMINAL LIGATION        Allergies:  Allergies   Allergen Reactions   • Lisinopril Unknown (See Comments)     Fluid retention and cough       Home Meds:    (Not in a hospital admission)  Current Meds:     Current Outpatient Medications:   •  amLODIPine (NORVASC) 5 MG tablet, Take 5 mg by mouth Daily., Disp: , Rfl: 0  •  BREO ELLIPTA 200-25 MCG/INH inhaler, Inhale 1 puff Daily., Disp: , Rfl: 5  •  DULoxetine (CYMBALTA) 60 MG capsule, TAKE 1 CAPSULE BY MOUTH EVERY DAY, Disp: 90 capsule, Rfl: 0  •  famotidine (PEPCID) 20 MG tablet, Take 20 mg by mouth Daily., Disp: , Rfl: 2  •  fluticasone (FLONASE) 50 MCG/ACT nasal spray, FLONASE 50  MCG/ACT NASAL SUSPENSION, Disp: , Rfl:   •  folic acid (FOLVITE) 1 MG tablet, TAKE 1 TABLET BY MOUTH EVERY DAY, Disp: 90 tablet, Rfl: 1  •  hydroCHLOROthiazide (MICROZIDE) 12.5 MG capsule, Take 12.5 mg by mouth Daily., Disp: , Rfl: 1  •  losartan (COZAAR) 100 MG tablet, Take 100 mg by mouth Daily., Disp: , Rfl: 2  •  metoprolol succinate XL (TOPROL-XL) 50 MG 24 hr tablet, Take 50 mg by mouth Daily., Disp: , Rfl: 1  •  OZEMPIC, 0.25 OR 0.5 MG/DOSE, 2 MG/1.5ML solution pen-injector, , Disp: , Rfl:   •  pioglitazone (ACTOS) 15 MG tablet, Take 15 mg by mouth Daily., Disp: , Rfl: 1  •  TRADJENTA 5 MG tablet tablet, Take 5 mg by mouth Daily., Disp: , Rfl: 0  •  adalimumab (Humira) 40 MG/0.8ML Prefilled Syringe Kit injection, , Disp: , Rfl:   Social History:   Social History     Tobacco Use   • Smoking status: Former Smoker     Types: Electronic Cigarette, Cigarettes   • Smokeless tobacco: Never Used   • Tobacco comment: vape, less than half   Substance Use Topics   • Alcohol use: Yes     Alcohol/week: 2.0 standard drinks     Types: 2 Cans of beer per week      Family History:  Family History   Problem Relation Age of Onset   • Diabetes Mother    • COPD Mother    • Heart disease Mother    • No Known Problems Father    • Diabetes Brother         Review of Systems : Review of Systems   Constitution: Negative for fever, malaise/fatigue, weight gain and weight loss.   HENT: Negative for congestion, hearing loss and nosebleeds.    Eyes: Negative for double vision and visual disturbance.   Cardiovascular: Positive for dyspnea on exertion. Negative for chest pain, claudication, leg swelling, near-syncope, palpitations and syncope.   Respiratory: Negative for cough, hemoptysis and shortness of breath.    Endocrine: Negative for cold intolerance, heat intolerance, polydipsia and polyphagia.   Hematologic/Lymphatic: Does not bruise/bleed easily.   Skin: Negative for color change and rash.   Musculoskeletal: Negative for arthritis,  back pain and joint pain.   Gastrointestinal: Negative for abdominal pain, diarrhea, heartburn, hematochezia, melena, nausea and vomiting.   Genitourinary: Negative for dysuria and hematuria.   Neurological: Negative for excessive daytime sleepiness, dizziness and seizures.   Psychiatric/Behavioral: Negative for altered mental status and depression. The patient is not nervous/anxious.    All other systems reviewed and are negative.            Constitutional:  Heart Rate:  [71] 71  BP: (142)/(77) 142/77  Answers for HPI/ROS submitted by the patient on 5/18/2020   What is the primary reason for your visit?: Other  Please describe your symptoms.: Weight loss surgery. Dr Neely's office scheduled appointment.  Have you had these symptoms before?: No  How long have you been having these symptoms?: 1-4 days    Physical Exam   /77   Pulse 71   Wt (!) 174 kg (384 lb)   SpO2 94%   BMI 63.90 kg/m²   Physical Exam  General:  Appears in no acute distress, morbidly obese  Eyes: Sclera is anicteric,  conjunctiva is clear   HEENT:  No JVD. Thyroid not visibly enlarged. No mucosal pallor or cyanosis  Respiratory: Respirations regular and unlabored at rest.  Distant breath sounds  Cardiovascular: S1,S2 Regular rate and rhythm. No murmur, rub or gallop auscultated.  1+ pretibial pitting edema  Gastrointestinal: Abdomen soft, flat, non tender. Bowel sounds present.   Musculoskeletal:  No abnormal movements  Extremities: No digital clubbing or cyanosis  Skin: Stasis changes seen  Neuro: Alert and awake, no lateralizing deficits appreciated    Cardiographics  ECG: EKG tracing was  personally reviewed by me    ECG 12 Lead  Date/Time: 5/21/2020 4:34 PM  Performed by: Umer Pandya MD  Authorized by: Umer Pandya MD   Previous ECG: no previous ECG available  Comments: EKG done today reviewed by me shows sinus rhythm at the rate of 74 bpm with low precordial voltage and minimal ST segment depression, resting  EKG seem                 Imaging  Chest X-ray:   Imaging Results (Last 24 Hours)     ** No results found for the last 24 hours. **          Lab Review: I have reviewed the labs              @[email or web address]                [facility] José Manuel Pandya MD  5/21/2020, 16:33      EMR Dragon/Transcription:   Dictated utilizing Dragon dictation

## 2020-06-04 ENCOUNTER — HOSPITAL ENCOUNTER (OUTPATIENT)
Dept: NUCLEAR MEDICINE | Facility: HOSPITAL | Age: 55
Discharge: HOME | End: 2020-06-04

## 2020-06-04 DIAGNOSIS — E11.9 TYPE 2 DIABETES MELLITUS WITHOUT COMPLICATION, WITHOUT LONG-TERM CURRENT USE OF INSULIN (HCC): ICD-10-CM

## 2020-06-04 DIAGNOSIS — R60.0 EDEMA LEG: ICD-10-CM

## 2020-06-04 DIAGNOSIS — R06.09 DYSPNEA ON EXERTION: ICD-10-CM

## 2020-06-04 DIAGNOSIS — Z82.49 FAMILY HISTORY OF PREMATURE CAD: ICD-10-CM

## 2020-06-04 DIAGNOSIS — I10 ESSENTIAL HYPERTENSION: ICD-10-CM

## 2020-06-04 DIAGNOSIS — E66.01 MORBID OBESITY WITH BODY MASS INDEX (BMI) OF 60.0 TO 69.9 IN ADULT (HCC): ICD-10-CM

## 2020-06-04 LAB
BH CV NUCLEAR PRIOR STUDY: 3
BH CV STRESS BP STAGE 1: NORMAL
BH CV STRESS BP STAGE 2: NORMAL
BH CV STRESS BP STAGE 3: NORMAL
BH CV STRESS COMMENTS STAGE 1: NORMAL
BH CV STRESS DOSE REGADENOSON STAGE 1: 0.4
BH CV STRESS DURATION MIN STAGE 1: 1
BH CV STRESS DURATION MIN STAGE 2: 1
BH CV STRESS DURATION MIN STAGE 3: 0
BH CV STRESS DURATION SEC STAGE 2: 0
BH CV STRESS DURATION SEC STAGE 3: 25
BH CV STRESS HR STAGE 1: 95
BH CV STRESS HR STAGE 2: 90
BH CV STRESS HR STAGE 3: 88
BH CV STRESS PROTOCOL 1: NORMAL
BH CV STRESS RECOVERY BP: NORMAL MMHG
BH CV STRESS RECOVERY HR: 77 BPM
BH CV STRESS STAGE 1: 1
BH CV STRESS STAGE 2: 2
BH CV STRESS STAGE 3: 3
LV EF NUC BP: 77 %
MAXIMAL PREDICTED HEART RATE: 163 BPM
PERCENT MAX PREDICTED HR: 58.9 %
STRESS BASELINE BP: NORMAL MMHG
STRESS BASELINE HR: 75 BPM
STRESS PERCENT HR: 69 %
STRESS POST PEAK BP: NORMAL MMHG
STRESS POST PEAK HR: 96 BPM
STRESS TARGET HR: 139 BPM

## 2020-06-04 PROCEDURE — 78452 HT MUSCLE IMAGE SPECT MULT: CPT | Performed by: INTERNAL MEDICINE

## 2020-06-04 PROCEDURE — 25010000002 REGADENOSON 0.4 MG/5ML SOLUTION: Performed by: INTERNAL MEDICINE

## 2020-06-04 PROCEDURE — 93018 CV STRESS TEST I&R ONLY: CPT | Performed by: NURSE PRACTITIONER

## 2020-06-04 PROCEDURE — 0 TECHNETIUM SESTAMIBI: Performed by: INTERNAL MEDICINE

## 2020-06-04 PROCEDURE — A9500 TC99M SESTAMIBI: HCPCS | Performed by: INTERNAL MEDICINE

## 2020-06-04 PROCEDURE — 78452 HT MUSCLE IMAGE SPECT MULT: CPT

## 2020-06-04 PROCEDURE — 93017 CV STRESS TEST TRACING ONLY: CPT

## 2020-06-04 RX ADMIN — TECHNETIUM TC 99M SESTAMIBI 1 DOSE: 1 INJECTION INTRAVENOUS at 09:55

## 2020-06-04 RX ADMIN — REGADENOSON 0.4 MG: 0.08 INJECTION, SOLUTION INTRAVENOUS at 09:55

## 2020-06-04 RX ADMIN — TECHNETIUM TC 99M SESTAMIBI 1 DOSE: 1 INJECTION INTRAVENOUS at 08:30

## 2020-06-05 ENCOUNTER — HOSPITAL ENCOUNTER (OUTPATIENT)
Dept: CARDIOLOGY | Facility: HOSPITAL | Age: 55
Discharge: HOME | End: 2020-06-05
Admitting: INTERNAL MEDICINE

## 2020-06-05 ENCOUNTER — OFFICE VISIT (OUTPATIENT)
Dept: CARDIOLOGY | Facility: CLINIC | Age: 55
End: 2020-06-05

## 2020-06-05 VITALS — HEIGHT: 65 IN | WEIGHT: 293 LBS | BODY MASS INDEX: 48.82 KG/M2

## 2020-06-05 VITALS
WEIGHT: 293 LBS | SYSTOLIC BLOOD PRESSURE: 128 MMHG | HEART RATE: 70 BPM | BODY MASS INDEX: 48.82 KG/M2 | DIASTOLIC BLOOD PRESSURE: 78 MMHG | OXYGEN SATURATION: 98 % | HEIGHT: 65 IN

## 2020-06-05 DIAGNOSIS — R60.0 EDEMA LEG: ICD-10-CM

## 2020-06-05 DIAGNOSIS — Z82.49 FAMILY HISTORY OF PREMATURE CAD: ICD-10-CM

## 2020-06-05 DIAGNOSIS — E11.9 TYPE 2 DIABETES MELLITUS WITHOUT COMPLICATION, WITHOUT LONG-TERM CURRENT USE OF INSULIN (HCC): ICD-10-CM

## 2020-06-05 DIAGNOSIS — Z01.810 PREOPERATIVE CARDIOVASCULAR EXAMINATION: Primary | ICD-10-CM

## 2020-06-05 DIAGNOSIS — I10 ESSENTIAL HYPERTENSION: ICD-10-CM

## 2020-06-05 DIAGNOSIS — R06.09 DYSPNEA ON EXERTION: ICD-10-CM

## 2020-06-05 DIAGNOSIS — E66.01 MORBID OBESITY WITH BODY MASS INDEX (BMI) OF 60.0 TO 69.9 IN ADULT (HCC): ICD-10-CM

## 2020-06-05 PROCEDURE — 99213 OFFICE O/P EST LOW 20 MIN: CPT | Performed by: INTERNAL MEDICINE

## 2020-06-05 PROCEDURE — 93306 TTE W/DOPPLER COMPLETE: CPT

## 2020-06-05 RX ORDER — TIZANIDINE 4 MG/1
4 TABLET ORAL NIGHTLY PRN
COMMUNITY
End: 2020-10-08

## 2020-06-08 NOTE — PROGRESS NOTES
Subjective:     Encounter Date:2020      Patient ID: Nighat May is a 55 y.o. female.    Chief Complaint : Stress test follow-up.  Preop for bariatric surgery.  History of Present Illness        This is a 55-year-old with PMH of    -Type 2 diabetes  -Morbid obesity  -Hypertension  -Dyslipidemia  -Rheumatoid arthritis  -, tubal ligation, hand surgery  -Former smoker, intermittently smokes  -Positive family history of premature CAD mother  at 59 with MI    Here for stress test follow-up.  Patient is preoperative cardiovascular evaluation for bariatric surgery.  She has been noticing decrease in stamina and dyspnea on exertion which is relieved with rest and the recent to distant past.  Denies any chest pain lightheadedness dizziness loss of consciousness.  Review of labs from 2020 revealed normal CMP and CBC.  Patient's arterial blood pressure is 128/78, heart rate 70, O2 sat of 78% on room air.  Patient had Lexiscan Cardiolite 2020 which showed diaphragmatic attenuation consistent with inferior wall MI..    Assessment:      -Dyspnea on exertion consistent with anginal equivalent in a diabetic patient  -Preoperative cardiovascular evaluation for bariatric surgery,inactive patient  -Abnormal stress with diaphragmatic attenuation versus inferior wall MI  -Diabetes, hypertension, dyslipidemia  -Morbid obesity  -Positive family for premature CAD  -Cigarette smoker    Recommendations / Plan:        Reviewed stress test results with patient.  Patient's Lexiscan Cardiolite is abnormal with fixed inferior defect diaphragmatic attenuation versus inferior wall MI.  Since there is no ischemia we will clear her for surgery.  Reviewed echo results with patient  Counseled on smoking cessation diet exercise and weight loss  Advised patient to call if her symptoms are worse.            Assessment:          Diagnosis Plan   1. Preoperative cardiovascular examination     2. Morbid obesity with body mass  index (BMI) of 60.0 to 69.9 in adult (CMS/[facility])     3. Essential hypertension     4. Type 2 diabetes mellitus without complication, without long-term current use of insulin (CMS/[facility])     5. Family history of premature CAD            Plan:         Past Medical History:  Past Medical History:   Diagnosis Date   • Asthma     exercise induced   • Depression    • DM type 2 (diabetes mellitus, type 2) (CMS/[facility])    • Fibromyalgia, primary    • Hypertension    • Rheumatoid arthritis (CMS/[facility])      Past Surgical History:  Past Surgical History:   Procedure Laterality Date   •  SECTION      x2   • HAND SURGERY      UCL repair right thumb   • TUBAL ABDOMINAL LIGATION        Allergies:  Allergies   Allergen Reactions   • Lisinopril Unknown (See Comments)     Fluid retention and cough       Home Meds:  Current Meds:     Current Outpatient Medications:   •  amLODIPine (NORVASC) 5 MG tablet, Take 5 mg by mouth Daily., Disp: , Rfl: 0  •  BREO ELLIPTA 200-25 MCG/INH inhaler, Inhale 1 puff Daily., Disp: , Rfl: 5  •  DULoxetine (CYMBALTA) 60 MG capsule, TAKE 1 CAPSULE BY MOUTH EVERY DAY, Disp: 90 capsule, Rfl: 0  •  famotidine (PEPCID) 20 MG tablet, Take 20 mg by mouth Daily., Disp: , Rfl: 2  •  fluticasone (FLONASE) 50 MCG/ACT nasal spray, FLONASE 50 MCG/ACT NASAL SUSPENSION, Disp: , Rfl:   •  folic acid (FOLVITE) 1 MG tablet, TAKE 1 TABLET BY MOUTH EVERY DAY, Disp: 90 tablet, Rfl: 1  •  hydroCHLOROthiazide (MICROZIDE) 12.5 MG capsule, Take 12.5 mg by mouth Daily., Disp: , Rfl: 1  •  losartan (COZAAR) 100 MG tablet, Take 100 mg by mouth Daily., Disp: , Rfl: 2  •  metoprolol succinate XL (TOPROL-XL) 50 MG 24 hr tablet, Take 50 mg by mouth Daily., Disp: , Rfl: 1  •  OZEMPIC, 0.25 OR 0.5 MG/DOSE, 2 MG/1.5ML solution pen-injector, , Disp: , Rfl:   •  pioglitazone (ACTOS) 15 MG tablet, Take 15 mg by mouth Daily., Disp: , Rfl: 1  •  tiZANidine (ZANAFLEX) 4 MG tablet, Take 4 mg by mouth At Night As Needed for Muscle Spasms (prn).,  "Disp: , Rfl:   •  TRADJENTA 5 MG tablet tablet, Take 5 mg by mouth Daily., Disp: , Rfl: 0  •  adalimumab (Humira) 40 MG/0.8ML Prefilled Syringe Kit injection, , Disp: , Rfl:   Social History:   Social History     Tobacco Use   • Smoking status: Current Every Day Smoker     Types: Cigarettes   • Smokeless tobacco: Never Used   Substance Use Topics   • Alcohol use: Yes     Alcohol/week: 2.0 standard drinks     Types: 2 Cans of beer per week      Family History:  Family History   Problem Relation Age of Onset   • Diabetes Mother    • COPD Mother    • Heart disease Mother    • No Known Problems Father    • Diabetes Brother         The following portions of the patient's history were reviewed and updated as appropriate: allergies, current medications, past family history, past medical history, past social history, past surgical history and problem list.      Review of Systems   Cardiovascular: Negative for chest pain, leg swelling and palpitations.   Respiratory: Positive for shortness of breath.    Neurological: Negative for dizziness and numbness.     Comprehensive review of systems were reviewed and all others review of systems were found to be negative other than HPI    Procedures Lexiscan Cardiolite 6/4/2020 shows fixed inferior defect consistent with diaphragmatic attenuation versus MI.  And fixed anterior septal defect consistent with breast attenuation.       Objective:     Physical Exam  /78   Pulse 70   Ht 165.1 cm (65\")   Wt (!) 175 kg (385 lb)   SpO2 98%   BMI 64.07 kg/m²   General:  Appears in no acute distress, obese  Eyes: Sclera is anicteric,  conjunctiva is clear   HEENT:  No JVD. Thyroid not visibly enlarged. No mucosal pallor or cyanosis  Respiratory: Respirations regular and unlabored at rest.  Bilaterally good breath sounds, with good air entry in all fields. No crackles, rubs or wheezes auscultated  Cardiovascular: S1,S2 Regular rate and rhythm. No murmur, rub or gallop auscultated. No " pretibial pitting edema  Gastrointestinal: Abdomen soft, flat, non tender. Bowel sounds present.   Musculoskeletal:  No abnormal movements  Extremities: No digital clubbing or cyanosis  Skin: Color pink. Skin warm and dry to touch. No rashes  No xanthoma  Neuro: Alert and awake, no lateralizing deficits appreciated    Lab Reviewed:

## 2020-06-09 ENCOUNTER — OFFICE VISIT (OUTPATIENT)
Dept: BARIATRICS/WEIGHT MGMT | Facility: CLINIC | Age: 55
End: 2020-06-09

## 2020-06-09 DIAGNOSIS — E66.9 SUPER OBESE: Primary | ICD-10-CM

## 2020-06-09 LAB
BH CV ECHO MEAS - AO MAX PG (FULL): 5.3 MMHG
BH CV ECHO MEAS - AO MAX PG: 15.6 MMHG
BH CV ECHO MEAS - AO MEAN PG (FULL): 3.3 MMHG
BH CV ECHO MEAS - AO MEAN PG: 8.8 MMHG
BH CV ECHO MEAS - AO ROOT AREA: 5.9 CM^2
BH CV ECHO MEAS - AO ROOT DIAM: 2.7 CM
BH CV ECHO MEAS - AO V2 MAX: 197.5 CM/SEC
BH CV ECHO MEAS - AO V2 MEAN: 139 CM/SEC
BH CV ECHO MEAS - AO V2 VTI: 41.4 CM
BH CV ECHO MEAS - EDV(CUBED): 75 ML
BH CV ECHO MEAS - EDV(MOD-SP4): 62.3 ML
BH CV ECHO MEAS - EDV(TEICH): 79.3 ML
BH CV ECHO MEAS - EF(CUBED): 58.7 %
BH CV ECHO MEAS - EF(MOD-SP4): 83.2 %
BH CV ECHO MEAS - EF(TEICH): 50.7 %
BH CV ECHO MEAS - ESV(CUBED): 30.9 ML
BH CV ECHO MEAS - ESV(MOD-SP4): 10.5 ML
BH CV ECHO MEAS - ESV(TEICH): 39.1 ML
BH CV ECHO MEAS - FS: 25.5 %
BH CV ECHO MEAS - IVS/LVPW: 1.3
BH CV ECHO MEAS - IVSD: 1.1 CM
BH CV ECHO MEAS - LA DIMENSION: 4.5 CM
BH CV ECHO MEAS - LA/AO: 1.6
BH CV ECHO MEAS - LV MASS(C)D: 141.5 GRAMS
BH CV ECHO MEAS - LV MAX PG: 10.3 MMHG
BH CV ECHO MEAS - LV MEAN PG: 5.5 MMHG
BH CV ECHO MEAS - LV V1 MAX: 160.4 CM/SEC
BH CV ECHO MEAS - LV V1 MEAN: 109.1 CM/SEC
BH CV ECHO MEAS - LV V1 VTI: 32.7 CM
BH CV ECHO MEAS - LVIDD: 4.2 CM
BH CV ECHO MEAS - LVIDS: 3.1 CM
BH CV ECHO MEAS - LVPWD: 0.89 CM
BH CV ECHO MEAS - MV A MAX VEL: 84.6 CM/SEC
BH CV ECHO MEAS - MV DEC SLOPE: 485 CM/SEC^2
BH CV ECHO MEAS - MV DEC TIME: 0.21 SEC
BH CV ECHO MEAS - MV E MAX VEL: 101.1 CM/SEC
BH CV ECHO MEAS - MV E/A: 1.2
BH CV ECHO MEAS - MV MAX PG: 5.4 MMHG
BH CV ECHO MEAS - MV MEAN PG: 3 MMHG
BH CV ECHO MEAS - MV V2 MAX: 116.7 CM/SEC
BH CV ECHO MEAS - MV V2 MEAN: 84.8 CM/SEC
BH CV ECHO MEAS - MV V2 VTI: 27 CM
BH CV ECHO MEAS - PA ACC TIME: 0.14 SEC
BH CV ECHO MEAS - PA MAX PG (FULL): -0.4 MMHG
BH CV ECHO MEAS - PA MAX PG: 6.2 MMHG
BH CV ECHO MEAS - PA PR(ACCEL): 15.2 MMHG
BH CV ECHO MEAS - PA V2 MAX: 124.9 CM/SEC
BH CV ECHO MEAS - PULM DIAS VEL: 65 CM/SEC
BH CV ECHO MEAS - PULM S/D: 1
BH CV ECHO MEAS - PULM SYS VEL: 66 CM/SEC
BH CV ECHO MEAS - RAP SYSTOLE: 3 MMHG
BH CV ECHO MEAS - RV MAX PG: 6.6 MMHG
BH CV ECHO MEAS - RV MEAN PG: 4.2 MMHG
BH CV ECHO MEAS - RV V1 MAX: 128.8 CM/SEC
BH CV ECHO MEAS - RV V1 MEAN: 98.7 CM/SEC
BH CV ECHO MEAS - RV V1 VTI: 25.9 CM
BH CV ECHO MEAS - RVDD: 3.9 CM
BH CV ECHO MEAS - RVSP: 31.2 MMHG
BH CV ECHO MEAS - SV(AO): 242.7 ML
BH CV ECHO MEAS - SV(CUBED): 44 ML
BH CV ECHO MEAS - SV(MOD-SP4): 51.8 ML
BH CV ECHO MEAS - SV(TEICH): 40.2 ML
BH CV ECHO MEAS - TR MAX VEL: 262.3 CM/SEC
MAXIMAL PREDICTED HEART RATE: 163 BPM
STRESS TARGET HR: 139 BPM

## 2020-06-09 PROCEDURE — 93306 TTE W/DOPPLER COMPLETE: CPT | Performed by: INTERNAL MEDICINE

## 2020-06-11 DIAGNOSIS — K21.9 GASTROESOPHAGEAL REFLUX DISEASE, ESOPHAGITIS PRESENCE NOT SPECIFIED: Primary | ICD-10-CM

## 2020-06-15 ENCOUNTER — OFFICE VISIT (OUTPATIENT)
Dept: BARIATRICS/WEIGHT MGMT | Facility: CLINIC | Age: 55
End: 2020-06-15

## 2020-06-15 VITALS
SYSTOLIC BLOOD PRESSURE: 152 MMHG | DIASTOLIC BLOOD PRESSURE: 69 MMHG | BODY MASS INDEX: 48.82 KG/M2 | WEIGHT: 293 LBS | HEIGHT: 65 IN | HEART RATE: 81 BPM

## 2020-06-15 DIAGNOSIS — E66.9 SUPER OBESE: Primary | ICD-10-CM

## 2020-06-15 NOTE — PROGRESS NOTES
[facility]  [address] [place] [address]  [address] [place] [address]  Dept: [phone]  6/15/2020      Nighat May.  [id number]  [id number]  1965  female      Chief Complaint   Patient presents with   • Obesity   • Nutrition Counseling   • Weight Loss       The patient is here for month SWL #6 of their pre-operative supervised weight loss visit. She had a loss of -5.8 lbs. Has partially me following goals: 1) Eating small plate 2) Weight gain noted but used different scales over has had weight loss overall. Patient is working on eliminating fast foods, fried foods, sweets and soda.  Nighat May has been increasing her daily water intake. She has been exercising: walking and working in yard.    Patient states they have made positive changes including more active and portion size.   The patient admits to be struggling with craving meaty/cheese.     Particular food craving     24 hour recall  Breakfast: sausage, egg, cheese muffin with grapes   Lunch: lite spam and chrackers  Dinner: 2 sausage jalapeno/cheese 1/2 cup potato salad  Snack: none    Drink: water, coffee     Review of Systems  Vitals:    06/15/20 1133   BP: 152/69   Pulse: 81     Patient Active Problem List   Diagnosis   • Carpal tunnel syndrome, bilateral upper limbs   • Chest pain   • Depression   • Diabetes mellitus (CMS/HCC)   • Elevated erythrocyte sedimentation rate   • Encounter for screening for osteoporosis   • Fatigue   • Fibromyalgia   • HTN (hypertension)   • Seronegative arthritis   • Inflammatory arthritis   • Joint pain   • Arthralgia of left wrist   • Body mass index 50.0-59.9, adult (CMS/HCC)   • Obesity   • Other long term (current) drug therapy   • Other specified disorders of bone density and structure, other site   • Rotator cuff tendonitis   • Urinary tract infection   • Vitamin D deficiency   • Morbid obesity (CMS/[facility])   •  Pre-operative examination     Body mass index is 63.77 kg/m².  Documented weights    06/15/20 1133   Weight: (!) 174 kg (383 lb 3.2 oz)     Weight change from last appointment: + 8.4 lb   Weight change overall: -5.8 lb     Goals:  Weight loss goal 3-5 lb  by next appointment and Work on portion size with calorie dense foods.     Discussion/Plan:  Obesity/Morbid Obesity: Currently the patient's weight is decreasing. I reviewed the appropriate dietary choices with the patient and encouraged the necessary changes. Recommended at least 60 grams of protein per day, around 33 grams of fats and less than 100 grams of carbohydrates. Reviewed calorie intake if patient wanted to calorie count and/or had BMR. Instructed patient to drink 64 ounces of water per day and exercise a minimum of 150 minutes per week including both cardio and strength training.  Discussed with pt also eating and drinking separately stopping 30 minutes before meals and 45 minutes after meals. Discussed the option of keeping a food journal which will help patient become more aware of the nutritional value of foods so they are more prepared after surgery.    The patient was given written materials from our office for education.   I answered all of the patients questions regarding dietary changes, exercise or surgical options.  Future Appointments   Date Time Provider Department Center   6/7/2021  1:00 PM Umer Pandya MD [facility]     The patient will follow up in one month on.    PES: Undesirable food choices RT lacks readiness to apply systems changes as evidenced by large portions/eating high calorie/high fat foods.     The total time spent face to face was 20 minutes with 20 minutes spent counseling.

## 2020-06-18 ENCOUNTER — PREP FOR SURGERY (OUTPATIENT)
Dept: OTHER | Facility: HOSPITAL | Age: 55
End: 2020-06-18

## 2020-06-18 RX ORDER — SODIUM CHLORIDE, SODIUM LACTATE, POTASSIUM CHLORIDE, CALCIUM CHLORIDE 600; 310; 30; 20 MG/100ML; MG/100ML; MG/100ML; MG/100ML
30 INJECTION, SOLUTION INTRAVENOUS CONTINUOUS
Status: CANCELLED | OUTPATIENT
Start: 2020-06-18

## 2020-07-13 ENCOUNTER — TELEPHONE (OUTPATIENT)
Dept: BARIATRICS/WEIGHT MGMT | Facility: CLINIC | Age: 55
End: 2020-07-13

## 2020-07-28 ENCOUNTER — OFFICE VISIT (OUTPATIENT)
Dept: BARIATRICS/WEIGHT MGMT | Facility: CLINIC | Age: 55
End: 2020-07-28

## 2020-07-28 VITALS
WEIGHT: 293 LBS | DIASTOLIC BLOOD PRESSURE: 85 MMHG | SYSTOLIC BLOOD PRESSURE: 160 MMHG | HEIGHT: 65 IN | BODY MASS INDEX: 48.82 KG/M2 | HEART RATE: 78 BPM

## 2020-07-28 DIAGNOSIS — E66.01 SUPER-SUPER OBESE (HCC): Primary | ICD-10-CM

## 2020-07-28 NOTE — PROGRESS NOTES
[facility]  [address] [place] [address]  [address] [place] [address]  Dept: [phone]  7/28/2020      Nighat May.  [id number]  [id number]  1965  female      No chief complaint on file.      The patient is here for month SWL #7 of their pre-operative supervised weight loss visit. She had a loss of 1.6 lbs. Has partially met following goals: 1) Partially met weight loss goal of 1.6 lb, 2) Partially met goal calorie dense food. Patient is working on eliminating fast foods, fried foods, sweets and soda.  Nighat May has been increasing her daily water intake. She has been exercising: yard work and projects.    Patient states they have made positive changes including lost weight and drinking water.  The patient admits to be struggling with portion size.     Eat large portions    24 hr recall  Breakfast: Shake  Lunch: turkey and tomato sandwich  Dinner: chicken on top of salad  Snack: none  Drinks: water, tea, coffee     Review of Systems  There were no vitals filed for this visit.  Patient Active Problem List   Diagnosis   • Carpal tunnel syndrome, bilateral upper limbs   • Chest pain   • Depression   • Diabetes mellitus (CMS/HCC)   • Elevated erythrocyte sedimentation rate   • Encounter for screening for osteoporosis   • Fatigue   • Fibromyalgia   • HTN (hypertension)   • Seronegative arthritis   • Inflammatory arthritis   • Joint pain   • Arthralgia of left wrist   • Body mass index 50.0-59.9, adult (CMS/HCC)   • Obesity   • Other long term (current) drug therapy   • Other specified disorders of bone density and structure, other site   • Rotator cuff tendonitis   • Urinary tract infection   • Vitamin D deficiency   • Morbid obesity (CMS/HCC)   • Pre-operative examination     There is no height or weight on file to calculate BMI.  There were no vitals filed for this visit.  Weight change from last appointment:  -1.6 lb    Weight change overall:-7.4 lb     Goals:  Weight loss goal 1-2 lb  by next appointment and Bike 1-2 x per week for 10 minutes    Discussion/Plan:  Obesity/Morbid Obesity: Currently the patient's weight is decreasing. I reviewed the appropriate dietary choices with the patient and encouraged the necessary changes. Recommended at least 60 grams of protein per day, around 33 grams of fats and less than 100 grams of carbohydrates. Reviewed calorie intake if patient wanted to calorie count and/or had BMR. Instructed patient to drink 64 ounces of water per day and exercise a minimum of 150 minutes per week including both cardio and strength training.  Discussed with pt also eating and drinking separately stopping 30 minutes before meals and 45 minutes after meals. Discussed the option of keeping a food journal which will help patient become more aware of the nutritional value of foods so they are more prepared after surgery.    The patient was given written materials from our office for education.   I answered all of the patients questions regarding dietary changes, exercise or surgical options.  Future Appointments   Date Time Provider Department Center   6/7/2021  1:00 PM Umer Pandya MD [facility]     The patient will follow up in one month on.    PES: Undesirable food choices related to cultural practices that affect the ability to apply information AEB picnics/fish fries/birthdays.     The total time spent face to face was 20 minutes with 20 minutes spent counseling.

## 2020-08-11 ENCOUNTER — TELEPHONE (OUTPATIENT)
Dept: BARIATRICS/WEIGHT MGMT | Facility: CLINIC | Age: 55
End: 2020-08-11

## 2020-08-13 ENCOUNTER — PREP FOR SURGERY (OUTPATIENT)
Dept: OTHER | Facility: HOSPITAL | Age: 55
End: 2020-08-13

## 2020-08-13 RX ORDER — SODIUM CHLORIDE, SODIUM LACTATE, POTASSIUM CHLORIDE, CALCIUM CHLORIDE 600; 310; 30; 20 MG/100ML; MG/100ML; MG/100ML; MG/100ML
30 INJECTION, SOLUTION INTRAVENOUS CONTINUOUS PRN
Status: CANCELLED | OUTPATIENT
Start: 2020-08-13

## 2020-08-26 ENCOUNTER — HOSPITAL ENCOUNTER (OUTPATIENT)
Dept: GENERAL RADIOLOGY | Facility: HOSPITAL | Age: 55
Discharge: HOME | End: 2020-08-26
Admitting: NURSE PRACTITIONER

## 2020-08-26 ENCOUNTER — LAB (OUTPATIENT)
Dept: LAB | Facility: HOSPITAL | Age: 55
End: 2020-08-26

## 2020-08-26 PROCEDURE — C9803 HOPD COVID-19 SPEC COLLECT: HCPCS

## 2020-08-26 PROCEDURE — 71046 X-RAY EXAM CHEST 2 VIEWS: CPT

## 2020-08-26 PROCEDURE — U0002 COVID-19 LAB TEST NON-CDC: HCPCS

## 2020-08-26 PROCEDURE — U0004 COV-19 TEST NON-CDC HGH THRU: HCPCS

## 2020-08-27 ENCOUNTER — ANESTHESIA EVENT (OUTPATIENT)
Dept: GASTROENTEROLOGY | Facility: HOSPITAL | Age: 55
End: 2020-08-27

## 2020-08-27 LAB
REF LAB TEST METHOD: NORMAL
SARS-COV-2 RNA RESP QL NAA+PROBE: NOT DETECTED

## 2020-08-28 ENCOUNTER — ANESTHESIA (OUTPATIENT)
Dept: GASTROENTEROLOGY | Facility: HOSPITAL | Age: 55
End: 2020-08-28

## 2020-08-28 ENCOUNTER — HOSPITAL ENCOUNTER (OUTPATIENT)
Facility: HOSPITAL | Age: 55
Setting detail: HOSPITAL OUTPATIENT SURGERY
Discharge: HOME | End: 2020-08-28
Attending: SURGERY | Admitting: SURGERY

## 2020-08-28 VITALS
HEIGHT: 65 IN | TEMPERATURE: 98.4 F | HEART RATE: 72 BPM | BODY MASS INDEX: 48.82 KG/M2 | SYSTOLIC BLOOD PRESSURE: 108 MMHG | WEIGHT: 293 LBS | OXYGEN SATURATION: 97 % | DIASTOLIC BLOOD PRESSURE: 69 MMHG | RESPIRATION RATE: 14 BRPM

## 2020-08-28 LAB — GLUCOSE BLDC GLUCOMTR-MCNC: 164 MG/DL (ref 70–105)

## 2020-08-28 PROCEDURE — 94799 UNLISTED PULMONARY SVC/PX: CPT

## 2020-08-28 PROCEDURE — S0260 H&P FOR SURGERY: HCPCS | Performed by: SURGERY

## 2020-08-28 PROCEDURE — 82962 GLUCOSE BLOOD TEST: CPT

## 2020-08-28 PROCEDURE — 94660 CPAP INITIATION&MGMT: CPT

## 2020-08-28 PROCEDURE — 25010000002 PROPOFOL 200 MG/20ML EMULSION: Performed by: ANESTHESIOLOGY

## 2020-08-28 PROCEDURE — 88305 TISSUE EXAM BY PATHOLOGIST: CPT | Performed by: SURGERY

## 2020-08-28 PROCEDURE — 43239 EGD BIOPSY SINGLE/MULTIPLE: CPT | Performed by: SURGERY

## 2020-08-28 RX ORDER — SODIUM CHLORIDE 0.9 % (FLUSH) 0.9 %
10 SYRINGE (ML) INJECTION AS NEEDED
Status: DISCONTINUED | OUTPATIENT
Start: 2020-08-28 | End: 2020-08-28 | Stop reason: HOSPADM

## 2020-08-28 RX ORDER — SODIUM CHLORIDE 0.9 % (FLUSH) 0.9 %
10 SYRINGE (ML) INJECTION EVERY 12 HOURS SCHEDULED
Status: DISCONTINUED | OUTPATIENT
Start: 2020-08-28 | End: 2020-08-28 | Stop reason: HOSPADM

## 2020-08-28 RX ORDER — PROPOFOL 10 MG/ML
INJECTION, EMULSION INTRAVENOUS AS NEEDED
Status: DISCONTINUED | OUTPATIENT
Start: 2020-08-28 | End: 2020-08-28 | Stop reason: SURG

## 2020-08-28 RX ORDER — LIDOCAINE HYDROCHLORIDE 20 MG/ML
INJECTION, SOLUTION EPIDURAL; INFILTRATION; INTRACAUDAL; PERINEURAL AS NEEDED
Status: DISCONTINUED | OUTPATIENT
Start: 2020-08-28 | End: 2020-08-28 | Stop reason: SURG

## 2020-08-28 RX ORDER — SODIUM CHLORIDE 9 MG/ML
9 INJECTION, SOLUTION INTRAVENOUS CONTINUOUS PRN
Status: DISCONTINUED | OUTPATIENT
Start: 2020-08-28 | End: 2020-08-28 | Stop reason: HOSPADM

## 2020-08-28 RX ADMIN — LIDOCAINE HYDROCHLORIDE 100 MG: 20 INJECTION, SOLUTION EPIDURAL; INFILTRATION; INTRACAUDAL; PERINEURAL at 12:38

## 2020-08-28 RX ADMIN — PROPOFOL 210 MG: 10 INJECTION, EMULSION INTRAVENOUS at 12:49

## 2020-08-28 RX ADMIN — SODIUM CHLORIDE: 0.9 INJECTION, SOLUTION INTRAVENOUS at 12:39

## 2020-08-28 NOTE — ANESTHESIA PREPROCEDURE EVALUATION
Anesthesia Evaluation     Patient summary reviewed and Nursing notes reviewed                Airway   Large neck circumference and Possible difficult intubation  Dental      Pulmonary    (+) asthma,sleep apnea,   Cardiovascular     (+) hypertension,       Neuro/Psych  GI/Hepatic/Renal/Endo    (+) obesity, morbid obesity,  diabetes mellitus,     Musculoskeletal     (+) myalgias,   Abdominal    Substance History      OB/GYN          Other   arthritis,        Other Comment: Rheumatoid arthritis                  Anesthesia Plan    ASA 4     MAC   (To be done with BiPAP.  )

## 2020-08-28 NOTE — ANESTHESIA POSTPROCEDURE EVALUATION
Patient: Nighat May    Procedure Summary     Date:  08/28/20 Room / Location:  [facility] ENDOSCOPY 4 / [facility] ENDOSCOPY    Anesthesia Start:  1239 Anesthesia Stop:  1254    Procedure:  esophagogastroduodenoscopy with biopsy (N/A ) Diagnosis:       Body mass index (BMI) of 60.0-69.9 in adult (CMS/[facility])      (Body mass index (BMI) of 60.0-69.9 in adult (CMS/[facility]) [Z68.44])    Surgeon:  Tuyet Neely MD Provider:  Stephanie Hoang MD    Anesthesia Type:  MAC ASA Status:  4          Anesthesia Type: MAC    Vitals  Vitals Value Taken Time   /69 8/28/2020  1:19 PM   Temp     Pulse 71 8/28/2020  1:20 PM   Resp 14 8/28/2020  1:19 PM   SpO2 98 % 8/28/2020  1:20 PM   Vitals shown include unvalidated device data.        Post Anesthesia Care and Evaluation    Patient location during evaluation: PACU  Patient participation: complete - patient participated  Level of consciousness: awake  Pain scale: See nurse's notes for pain score.  Pain management: adequate  Airway patency: patent  Anesthetic complications: No anesthetic complications  PONV Status: none  Cardiovascular status: acceptable  Respiratory status: acceptable  Hydration status: acceptable    Comments: Patient seen and examined postoperatively; vital signs stable; SpO2 greater than or equal to 90%; cardiopulmonary status stable; nausea/vomiting adequately controlled; pain adequately controlled; no apparent anesthesia complications; patient discharged from anesthesia care when discharge criteria were met

## 2020-08-31 ENCOUNTER — OFFICE VISIT (OUTPATIENT)
Dept: BARIATRICS/WEIGHT MGMT | Facility: CLINIC | Age: 55
End: 2020-08-31

## 2020-08-31 VITALS
HEIGHT: 65 IN | WEIGHT: 293 LBS | TEMPERATURE: 98.4 F | BODY MASS INDEX: 48.82 KG/M2 | DIASTOLIC BLOOD PRESSURE: 64 MMHG | HEART RATE: 81 BPM | SYSTOLIC BLOOD PRESSURE: 149 MMHG

## 2020-08-31 DIAGNOSIS — E66.01 SUPER-SUPER OBESE (HCC): Primary | ICD-10-CM

## 2020-08-31 LAB
LAB AP CASE REPORT: NORMAL
LAB AP CLINICAL INFORMATION: NORMAL
PATH REPORT.FINAL DX SPEC: NORMAL
PATH REPORT.GROSS SPEC: NORMAL

## 2020-08-31 NOTE — PROGRESS NOTES
[facility]  [address] [place] [address]  [address] [place] [address]  Dept: [phone]  8/31/2020      Nighat May.  [id number]  [id number]  1965  female      No chief complaint on file.      The patient is here for ongoing of their pre-operative supervised weight loss visit. She had a loss of 1 lbs. Has partially met  following goals: 1) met weight loss goal of 1 lb, 2) Met exercise biking goal of 10 minutes 2x per week. Patient is working on eliminating fast foods, fried foods, sweets and soda.  Nighat May has been increasing her daily water intake. She has been exercising: station bike    Patient states they have made positive changes including exercising and portion control.  The patient admits to be struggling with worried about after surgery.     stress     24 hr recall  Breakfast: Shake  Lunch: Egg sandwich  Dinner: [employer] [place] cheese steak sandwich 1/2   Snack: oreo (2)   Drink: water and coffee    Review of Systems  There were no vitals filed for this visit.  Patient Active Problem List   Diagnosis   • Carpal tunnel syndrome, bilateral upper limbs   • Chest pain   • Depression   • Diabetes mellitus (CMS/[facility])   • Elevated erythrocyte sedimentation rate   • Encounter for screening for osteoporosis   • Fatigue   • Fibromyalgia   • HTN (hypertension)   • Seronegative arthritis   • Inflammatory arthritis   • Joint pain   • Arthralgia of left wrist   • Body mass index 50.0-59.9, adult (CMS/[facility])   • Obesity   • Other long term (current) drug therapy   • Other specified disorders of bone density and structure, other site   • Rotator cuff tendonitis   • Urinary tract infection   • Vitamin D deficiency   • Morbid obesity (CMS/HCC)   • Pre-operative examination   • Body mass index (BMI) of 60.0-69.9 in adult (CMS/[facility])     There is no height or weight on file to calculate BMI.  There were no vitals filed for  this visit.  Weight change from last appointment:-1 lb  Weight change overall:-8.4 lb     Goals:  Exercise  15 minutes for  6 day by next appointment and maintain current body weight    Discussion/Plan:  Obesity/Morbid Obesity: Currently the patient's weight is decreasing. I reviewed the appropriate dietary choices with the patient and encouraged the necessary changes. Recommended at least 60 grams of protein per day, around 33 grams of fats and less than 100 grams of carbohydrates. Reviewed calorie intake if patient wanted to calorie count and/or had BMR. Instructed patient to drink 64 ounces of water per day and exercise a minimum of 150 minutes per week including both cardio and strength training.  Discussed with pt also eating and drinking separately stopping 30 minutes before meals and 45 minutes after meals. Discussed the option of keeping a food journal which will help patient become more aware of the nutritional value of foods so they are more prepared after surgery.    The patient was given written materials from our office for education.   I answered all of the patients questions regarding dietary changes, exercise or surgical options.  Future Appointments   Date Time Provider Department Center   6/7/2021  1:00 PM Umer Pandya MD [facility]     The patient will follow up in one month on.    PES:     The total time spent face to face was 20 minutes with 20 minutes spent counseling.

## 2020-09-16 ENCOUNTER — PREP FOR SURGERY (OUTPATIENT)
Dept: OTHER | Facility: HOSPITAL | Age: 55
End: 2020-09-16

## 2020-09-16 DIAGNOSIS — E66.01 SUPER-SUPER OBESE (HCC): Primary | ICD-10-CM

## 2020-09-16 PROBLEM — E66.9 SUPER OBESE: Status: ACTIVE | Noted: 2020-09-16

## 2020-09-16 RX ORDER — SODIUM CHLORIDE 0.9 % (FLUSH) 0.9 %
3 SYRINGE (ML) INJECTION EVERY 12 HOURS SCHEDULED
Status: CANCELLED | OUTPATIENT
Start: 2020-09-16

## 2020-09-16 RX ORDER — CEFAZOLIN SODIUM IN 0.9 % NACL 3 G/100 ML
3 INTRAVENOUS SOLUTION, PIGGYBACK (ML) INTRAVENOUS
Status: CANCELLED | OUTPATIENT
Start: 2020-10-21

## 2020-09-16 RX ORDER — SODIUM CHLORIDE, SODIUM LACTATE, POTASSIUM CHLORIDE, CALCIUM CHLORIDE 600; 310; 30; 20 MG/100ML; MG/100ML; MG/100ML; MG/100ML
100 INJECTION, SOLUTION INTRAVENOUS CONTINUOUS
Status: CANCELLED | OUTPATIENT
Start: 2020-10-21

## 2020-09-16 RX ORDER — SCOLOPAMINE TRANSDERMAL SYSTEM 1 MG/1
1 PATCH, EXTENDED RELEASE TRANSDERMAL ONCE
Status: CANCELLED | OUTPATIENT
Start: 2020-10-21 | End: 2020-09-16

## 2020-09-16 RX ORDER — PANTOPRAZOLE SODIUM 40 MG/10ML
40 INJECTION, POWDER, LYOPHILIZED, FOR SOLUTION INTRAVENOUS ONCE
Status: CANCELLED | OUTPATIENT
Start: 2020-10-21 | End: 2020-09-16

## 2020-09-16 RX ORDER — SODIUM CHLORIDE 0.9 % (FLUSH) 0.9 %
3-10 SYRINGE (ML) INJECTION AS NEEDED
Status: CANCELLED | OUTPATIENT
Start: 2020-10-21

## 2020-09-16 RX ORDER — CHLORHEXIDINE GLUCONATE 0.12 MG/ML
15 RINSE ORAL SEE ADMIN INSTRUCTIONS
Status: CANCELLED | OUTPATIENT
Start: 2020-10-21

## 2020-09-16 RX ORDER — METOCLOPRAMIDE HYDROCHLORIDE 5 MG/ML
10 INJECTION INTRAMUSCULAR; INTRAVENOUS ONCE
Status: CANCELLED | OUTPATIENT
Start: 2020-10-21 | End: 2020-09-16

## 2020-10-02 PROBLEM — E66.01 SUPER-SUPER OBESE (HCC): Status: ACTIVE | Noted: 2020-10-02

## 2020-10-06 DIAGNOSIS — E66.01 CLASS 3 SEVERE OBESITY DUE TO EXCESS CALORIES WITH BODY MASS INDEX (BMI) OF 40.0 TO 44.9 IN ADULT, UNSPECIFIED WHETHER SERIOUS COMORBIDITY PRESENT (HCC): Primary | ICD-10-CM

## 2020-10-06 RX ORDER — URSODIOL 250 MG/1
250 TABLET, FILM COATED ORAL 2 TIMES DAILY
Qty: 30 TABLET | Refills: 6 | Status: CANCELLED | OUTPATIENT
Start: 2020-10-06

## 2020-10-08 ENCOUNTER — CONSULT (OUTPATIENT)
Dept: BARIATRICS/WEIGHT MGMT | Facility: CLINIC | Age: 55
End: 2020-10-08

## 2020-10-08 VITALS
HEIGHT: 65 IN | TEMPERATURE: 96.8 F | OXYGEN SATURATION: 95 % | SYSTOLIC BLOOD PRESSURE: 154 MMHG | WEIGHT: 293 LBS | BODY MASS INDEX: 48.82 KG/M2 | HEART RATE: 68 BPM | DIASTOLIC BLOOD PRESSURE: 77 MMHG | RESPIRATION RATE: 18 BRPM

## 2020-10-08 DIAGNOSIS — E66.9 SUPER OBESE: Primary | ICD-10-CM

## 2020-10-08 PROCEDURE — 99214 OFFICE O/P EST MOD 30 MIN: CPT | Performed by: SURGERY

## 2020-10-08 RX ORDER — CYANOCOBALAMIN 500 UG/1
SPRAY NASAL
COMMUNITY
Start: 2020-09-20 | End: 2021-07-15

## 2020-10-08 RX ORDER — URSODIOL 250 MG/1
250 TABLET, FILM COATED ORAL 2 TIMES DAILY
Qty: 60 TABLET | Refills: 5 | Status: SHIPPED | OUTPATIENT
Start: 2020-10-08 | End: 2021-07-15

## 2020-10-08 NOTE — PROGRESS NOTES
Bariatric Consult:  Referred by Connor Upton MD    Nighat May is here today for consult on Consult (Pre Op)      History of Present Illness:     Nighat May is a 55 y.o. female with morbid obesity with co-morbidities including asthma who presents for surgical consultation for the above procedure. Nighat has completed the initial intake visit and has been examined by our nurse practitioner, dietician, psychologist and underwent the extensive educational teaching process under the guidance of our bariatric coordinator and myself. Nighat also has seen the educational video MARTIN on the surgical procedure if available. Nighat attended today more educational teaching from our bariatric coordinator and myself. Nighat has had an extensive medical workup including a visit with their primary care physician, EKG, chest radiograph, blood work, EGD or UGI and possibly further testing. These have been reviewed by me and discussed with the patient. Nighat is now ready to proceed with surgery. Nighat presently denies nausea, vomiting, fever, chills, chest pain, shortness of air, melena, hematochezia, hemetemesis, dysuria, frequency, hematuria, jaundice or abdominal pain.     Wt Readings from Last 10 Encounters:   10/08/20 (!) 171 kg (377 lb)   08/31/20 (!) 173 kg (380 lb 9.6 oz)   08/28/20 (!) 174 kg (383 lb 9.6 oz)   07/28/20 (!) 173 kg (381 lb 9.6 oz)   06/15/20 (!) 174 kg (383 lb 3.2 oz)   06/05/20 (!) 170 kg (375 lb)   06/05/20 (!) 175 kg (385 lb)   05/21/20 (!) 174 kg (384 lb)   05/19/20 (!) 170 kg (374 lb 12.8 oz)   04/28/20 (!) 171 kg (377 lb 3.2 oz)       Her pre-op EGD shows normal, she does have some heartburn in the evening.       Past Medical History:   Diagnosis Date   • Asthma     exercise induced   • Depression    • DM type 2 (diabetes mellitus, type 2) (CMS/HCC)    • Enlarged thyroid    • Fibromyalgia, primary    • Hypertension    • Obesities, morbid (CMS/HCC)    • Rheumatoid arthritis (CMS/HCC)    • Sleep  apnea        No diagnosis found.    Past Surgical History:   Procedure Laterality Date   •  SECTION      x2   • ENDOSCOPY N/A 2020    Procedure: esophagogastroduodenoscopy with biopsy;  Surgeon: Tuyet Neely MD;  Location: [facility] ENDOSCOPY;  Service: General;  Laterality: N/A;   Normal   • HAND SURGERY      UCL repair right thumb   • TUBAL ABDOMINAL LIGATION         Patient Active Problem List   Diagnosis   • Carpal tunnel syndrome, bilateral upper limbs   • Chest pain   • Depression   • Diabetes mellitus (CMS/HCC)   • Elevated erythrocyte sedimentation rate   • Encounter for screening for osteoporosis   • Fatigue   • Fibromyalgia   • HTN (hypertension)   • Seronegative arthritis   • Inflammatory arthritis   • Joint pain   • Arthralgia of left wrist   • Body mass index 50.0-59.9, adult (CMS/HCC)   • Obesity   • Other long term (current) drug therapy   • Other specified disorders of bone density and structure, other site   • Rotator cuff tendonitis   • Urinary tract infection   • Vitamin D deficiency   • Morbid obesity (CMS/HCC)   • Pre-operative examination   • Body mass index (BMI) of 60.0-69.9 in adult (CMS/[facility])   • Super obese   • Super-super obese (CMS/[facility])       Allergies   Allergen Reactions   • Lisinopril Unknown (See Comments)     Fluid retention and cough           Current Outpatient Medications:   •  amLODIPine (NORVASC) 5 MG tablet, Take 5 mg by mouth Daily., Disp: , Rfl: 0  •  BREO ELLIPTA 200-25 MCG/INH inhaler, Inhale 1 puff Daily., Disp: , Rfl: 5  •  DULoxetine (CYMBALTA) 60 MG capsule, TAKE 1 CAPSULE BY MOUTH EVERY DAY (Patient taking differently: Take 60 mg by mouth Daily.), Disp: 90 capsule, Rfl: 0  •  famotidine (PEPCID) 20 MG tablet, Take 20 mg by mouth Daily., Disp: , Rfl: 2  •  fluticasone (FLONASE) 50 MCG/ACT nasal spray, FLONASE 50 MCG/ACT NASAL SUSPENSION, Disp: , Rfl:   •  hydroCHLOROthiazide (MICROZIDE) 12.5 MG capsule, Take 12.5 mg by mouth Daily., Disp: , Rfl: 1  •  losartan  (COZAAR) 100 MG tablet, Take 100 mg by mouth Daily., Disp: , Rfl: 2  •  metoprolol succinate XL (TOPROL-XL) 50 MG 24 hr tablet, Take 50 mg by mouth Daily., Disp: , Rfl: 1  •  OZEMPIC, 0.25 OR 0.5 MG/DOSE, 2 MG/1.5ML solution pen-injector, , Disp: , Rfl:   •  TRADJENTA 5 MG tablet tablet, Take 5 mg by mouth Daily., Disp: , Rfl: 0  •  Nascobal 500 MCG/0.1ML solution, , Disp: , Rfl:   •  ursodiol (ACTIGALL) 250 MG tablet, Take 1 tablet by mouth 2 (two) times a day., Disp: 60 tablet, Rfl: 5    Social History     Socioeconomic History   • Marital status:      Spouse name: Pepito May   • Number of children: 2   • Years of education: Not on file   • Highest education level: Not on file   Tobacco Use   • Smoking status: Current Every Day Smoker     Packs/day: 0.50     Types: Cigarettes   • Smokeless tobacco: Never Used   Substance and Sexual Activity   • Alcohol use: Yes     Alcohol/week: 2.0 standard drinks     Types: 2 Cans of beer per week   • Drug use: No   • Sexual activity: Defer       Family History   Problem Relation Age of Onset   • Diabetes Mother    • COPD Mother    • Heart disease Mother    • No Known Problems Father    • Diabetes Brother        Review of Systems:  Review of Systems   Constitutional: Negative.    HENT: Negative.    Eyes: Negative.    Respiratory: Negative.    Cardiovascular: Negative.    Gastrointestinal: Negative.    Endocrine: Negative.    Genitourinary: Negative.    Musculoskeletal: Negative.    Skin: Negative.    Allergic/Immunologic: Negative.    Neurological: Negative.    Hematological: Negative.    Psychiatric/Behavioral: Negative.          Physical Exam:    Vital Signs:  Weight: (!) 171 kg (377 lb)   Body mass index is 62.74 kg/m².  Temp: 96.8 °F (36 °C)   Heart Rate: 68   BP: 154/77       Physical Exam  Vitals signs reviewed.   Constitutional:       Appearance: She is well-developed.   HENT:      Head: Normocephalic and atraumatic.   Eyes:      Conjunctiva/sclera: Conjunctivae  normal.      Pupils: Pupils are equal, round, and reactive to light.   Neck:      Musculoskeletal: Normal range of motion and neck supple.   Cardiovascular:      Rate and Rhythm: Normal rate and regular rhythm.      Heart sounds: Normal heart sounds.   Pulmonary:      Effort: Pulmonary effort is normal.      Breath sounds: Normal breath sounds.   Abdominal:      General: Bowel sounds are normal. There is no distension.      Palpations: Abdomen is soft. There is no mass.      Tenderness: There is no abdominal tenderness. There is no guarding or rebound.      Hernia: No hernia is present.   Musculoskeletal: Normal range of motion.   Skin:     General: Skin is warm and dry.   Neurological:      Mental Status: She is alert and oriented to person, place, and time.           Assessment:    Nighat May is a 55 y.o. year old female with medically complicated severe obesity with a BMI of Body mass index is 62.74 kg/m². and multiple co-morbidities listed in the encounter diagnosis.    I think she is an appropriate candidate for this surgery, and is ready to proceed.      Plan/Discussion/Summary:        I instructed patient to start on a H2 blocker or proton pump inhibitor if not already on one of these medications.    I explained in detail the procedures that we perform.  All of these procedures have a chance to convert to open if any technical challenges or complications do occur.  Bariatric surgery is not cosmetic surgery but rather a tool to help a patient make a life-long commitment lifestyle change including diet, exercise, behavior changes, and taking supplemental vitamins and minerals.    Problems after surgery may require more operations to correct them.    The risks, benefits, alternatives, and potential complications of all of the procedures were explained in detail including, but not limited to death, anesthesia and medication adverse effect, deep venous thrombosis, pulmonary embolism, trocar site/incisional  hernia, wound infection, abdominal infection, bleeding, failure to lose weight, gain weight, a change in body image, metabolic complications with vitamin deficiences and anemia.    Weight loss expectations were discussed with the patient in detail. The weight loss operations most commonly performed are the sleeve gastrectomy and the Karissa-en-Y gastric bypass. These operations result in weight losses up to approximately 25-35% of initial body weight 12 to 24 months after surgery with the gastric bypass usually the higher percent of weight loss but depends on patient using the tool.    For the gastric bypass and loop duodenal switch (RUFUS-S) the risks include but not limited to the following early complications:  Anastomotic leak/peritonitis, Karissa/Alimentary/biliopancreatic limb obstruction, severe & minor wound infection/seroma, and nausea/vomiting.  Late complications can include but are not limited to malnutrition, vitamin deficiencies, frequent loose stools,  stomal stenosis, marginal ulcer, bowel obstruction, intussusception, internal, and incisional hernia.    Regarding the gastric sleeve, there is less long-term outcome data and higher risk of dysphagia and reflux compared to a gastric bypass, as well as risk of internal visceral/organ injury, splenectomy, bleeding, infection, leak (which could require further intervention possible conversion to Karissa-en-Y gastric bypass), stenosis and possibility of regaining weight.    Nighat was counseled regarding diagnostic results, instructions for management, risk factor reductions, prognosis, patient and family education, impressions, risks and benefits of treatment options and importance of compliance with treatment. Total face to face time of the encounter was over 45 minutes and over 30 minutes was spent counseling.     Jagjit Report   As part of this patient's treatment plan I am prescribing controlled substances. The patient has been made aware of appropriate use of  such medications, including potential risk of somnolence, limited ability to drive and /or work safely, and potential for dependence or overdose. It has also been made clear that these medications are for use by this patient only, without concomitant use of alcohol or other substances unless prescribed.    Nighat has completed prescribing agreement detailing terms of continued prescribing of controlled substances, including monitoring DAYNA reports, urine drug screening, and pill counts if necessary. Nighat is aware that inappropriate use will result in cessation of prescribing such medications.    DAYNA report has been reviewed      History and physical exam exhibit continued safe and appropriate use of controlled substances.      Nighat understands the surgical procedures and the different surgical options that are available.  She understands the lifestyle changes that are required after surgery and has agreed to follow the guidelines outlined in the weight management program.  She also expressed understanding of the risks involved and had all of female questions answered and desires to proceed.      Tuyet Neely MD  10/8/2020  Answers for HPI/ROS submitted by the patient on 10/3/2020   What is the primary reason for your visit?: Other  Please describe your symptoms.: Consult before gastric sleeve surgery  Have you had these symptoms before?: No  How long have you been having these symptoms?: 5-7 days

## 2020-10-08 NOTE — H&P (VIEW-ONLY)
Bariatric Consult:  Referred by Connor Upton MD    Nighat May is here today for consult on Consult (Pre Op)      History of Present Illness:     Nighat May is a 55 y.o. female with morbid obesity with co-morbidities including asthma who presents for surgical consultation for the above procedure. Nighat has completed the initial intake visit and has been examined by our nurse practitioner, dietician, psychologist and underwent the extensive educational teaching process under the guidance of our bariatric coordinator and myself. Nighat also has seen the educational video MARTIN on the surgical procedure if available. Nighat attended today more educational teaching from our bariatric coordinator and myself. Nighat has had an extensive medical workup including a visit with their primary care physician, EKG, chest radiograph, blood work, EGD or UGI and possibly further testing. These have been reviewed by me and discussed with the patient. Nighat is now ready to proceed with surgery. Nighat presently denies nausea, vomiting, fever, chills, chest pain, shortness of air, melena, hematochezia, hemetemesis, dysuria, frequency, hematuria, jaundice or abdominal pain.     Wt Readings from Last 10 Encounters:   10/08/20 (!) 171 kg (377 lb)   08/31/20 (!) 173 kg (380 lb 9.6 oz)   08/28/20 (!) 174 kg (383 lb 9.6 oz)   07/28/20 (!) 173 kg (381 lb 9.6 oz)   06/15/20 (!) 174 kg (383 lb 3.2 oz)   06/05/20 (!) 170 kg (375 lb)   06/05/20 (!) 175 kg (385 lb)   05/21/20 (!) 174 kg (384 lb)   05/19/20 (!) 170 kg (374 lb 12.8 oz)   04/28/20 (!) 171 kg (377 lb 3.2 oz)       Her pre-op EGD shows normal, she does have some heartburn in the evening.       Past Medical History:   Diagnosis Date   • Asthma     exercise induced   • Depression    • DM type 2 (diabetes mellitus, type 2) (CMS/HCC)    • Enlarged thyroid    • Fibromyalgia, primary    • Hypertension    • Obesities, morbid (CMS/HCC)    • Rheumatoid arthritis (CMS/HCC)    • Sleep  apnea        No diagnosis found.    Past Surgical History:   Procedure Laterality Date   •  SECTION      x2   • ENDOSCOPY N/A 2020    Procedure: esophagogastroduodenoscopy with biopsy;  Surgeon: Tuyet Neely MD;  Location: [facility] ENDOSCOPY;  Service: General;  Laterality: N/A;   Normal   • HAND SURGERY      UCL repair right thumb   • TUBAL ABDOMINAL LIGATION         Patient Active Problem List   Diagnosis   • Carpal tunnel syndrome, bilateral upper limbs   • Chest pain   • Depression   • Diabetes mellitus (CMS/HCC)   • Elevated erythrocyte sedimentation rate   • Encounter for screening for osteoporosis   • Fatigue   • Fibromyalgia   • HTN (hypertension)   • Seronegative arthritis   • Inflammatory arthritis   • Joint pain   • Arthralgia of left wrist   • Body mass index 50.0-59.9, adult (CMS/HCC)   • Obesity   • Other long term (current) drug therapy   • Other specified disorders of bone density and structure, other site   • Rotator cuff tendonitis   • Urinary tract infection   • Vitamin D deficiency   • Morbid obesity (CMS/HCC)   • Pre-operative examination   • Body mass index (BMI) of 60.0-69.9 in adult (CMS/[facility])   • Super obese   • Super-super obese (CMS/[facility])       Allergies   Allergen Reactions   • Lisinopril Unknown (See Comments)     Fluid retention and cough           Current Outpatient Medications:   •  amLODIPine (NORVASC) 5 MG tablet, Take 5 mg by mouth Daily., Disp: , Rfl: 0  •  BREO ELLIPTA 200-25 MCG/INH inhaler, Inhale 1 puff Daily., Disp: , Rfl: 5  •  DULoxetine (CYMBALTA) 60 MG capsule, TAKE 1 CAPSULE BY MOUTH EVERY DAY (Patient taking differently: Take 60 mg by mouth Daily.), Disp: 90 capsule, Rfl: 0  •  famotidine (PEPCID) 20 MG tablet, Take 20 mg by mouth Daily., Disp: , Rfl: 2  •  fluticasone (FLONASE) 50 MCG/ACT nasal spray, FLONASE 50 MCG/ACT NASAL SUSPENSION, Disp: , Rfl:   •  hydroCHLOROthiazide (MICROZIDE) 12.5 MG capsule, Take 12.5 mg by mouth Daily., Disp: , Rfl: 1  •  losartan  (COZAAR) 100 MG tablet, Take 100 mg by mouth Daily., Disp: , Rfl: 2  •  metoprolol succinate XL (TOPROL-XL) 50 MG 24 hr tablet, Take 50 mg by mouth Daily., Disp: , Rfl: 1  •  OZEMPIC, 0.25 OR 0.5 MG/DOSE, 2 MG/1.5ML solution pen-injector, , Disp: , Rfl:   •  TRADJENTA 5 MG tablet tablet, Take 5 mg by mouth Daily., Disp: , Rfl: 0  •  Nascobal 500 MCG/0.1ML solution, , Disp: , Rfl:   •  ursodiol (ACTIGALL) 250 MG tablet, Take 1 tablet by mouth 2 (two) times a day., Disp: 60 tablet, Rfl: 5    Social History     Socioeconomic History   • Marital status:      Spouse name: Pepito May   • Number of children: 2   • Years of education: Not on file   • Highest education level: Not on file   Tobacco Use   • Smoking status: Current Every Day Smoker     Packs/day: 0.50     Types: Cigarettes   • Smokeless tobacco: Never Used   Substance and Sexual Activity   • Alcohol use: Yes     Alcohol/week: 2.0 standard drinks     Types: 2 Cans of beer per week   • Drug use: No   • Sexual activity: Defer       Family History   Problem Relation Age of Onset   • Diabetes Mother    • COPD Mother    • Heart disease Mother    • No Known Problems Father    • Diabetes Brother        Review of Systems:  Review of Systems   Constitutional: Negative.    HENT: Negative.    Eyes: Negative.    Respiratory: Negative.    Cardiovascular: Negative.    Gastrointestinal: Negative.    Endocrine: Negative.    Genitourinary: Negative.    Musculoskeletal: Negative.    Skin: Negative.    Allergic/Immunologic: Negative.    Neurological: Negative.    Hematological: Negative.    Psychiatric/Behavioral: Negative.          Physical Exam:    Vital Signs:  Weight: (!) 171 kg (377 lb)   Body mass index is 62.74 kg/m².  Temp: 96.8 °F (36 °C)   Heart Rate: 68   BP: 154/77       Physical Exam  Vitals signs reviewed.   Constitutional:       Appearance: She is well-developed.   HENT:      Head: Normocephalic and atraumatic.   Eyes:      Conjunctiva/sclera: Conjunctivae  normal.      Pupils: Pupils are equal, round, and reactive to light.   Neck:      Musculoskeletal: Normal range of motion and neck supple.   Cardiovascular:      Rate and Rhythm: Normal rate and regular rhythm.      Heart sounds: Normal heart sounds.   Pulmonary:      Effort: Pulmonary effort is normal.      Breath sounds: Normal breath sounds.   Abdominal:      General: Bowel sounds are normal. There is no distension.      Palpations: Abdomen is soft. There is no mass.      Tenderness: There is no abdominal tenderness. There is no guarding or rebound.      Hernia: No hernia is present.   Musculoskeletal: Normal range of motion.   Skin:     General: Skin is warm and dry.   Neurological:      Mental Status: She is alert and oriented to person, place, and time.           Assessment:    Nighat May is a 55 y.o. year old female with medically complicated severe obesity with a BMI of Body mass index is 62.74 kg/m². and multiple co-morbidities listed in the encounter diagnosis.    I think she is an appropriate candidate for this surgery, and is ready to proceed.      Plan/Discussion/Summary:        I instructed patient to start on a H2 blocker or proton pump inhibitor if not already on one of these medications.    I explained in detail the procedures that we perform.  All of these procedures have a chance to convert to open if any technical challenges or complications do occur.  Bariatric surgery is not cosmetic surgery but rather a tool to help a patient make a life-long commitment lifestyle change including diet, exercise, behavior changes, and taking supplemental vitamins and minerals.    Problems after surgery may require more operations to correct them.    The risks, benefits, alternatives, and potential complications of all of the procedures were explained in detail including, but not limited to death, anesthesia and medication adverse effect, deep venous thrombosis, pulmonary embolism, trocar site/incisional  hernia, wound infection, abdominal infection, bleeding, failure to lose weight, gain weight, a change in body image, metabolic complications with vitamin deficiences and anemia.    Weight loss expectations were discussed with the patient in detail. The weight loss operations most commonly performed are the sleeve gastrectomy and the Karissa-en-Y gastric bypass. These operations result in weight losses up to approximately 25-35% of initial body weight 12 to 24 months after surgery with the gastric bypass usually the higher percent of weight loss but depends on patient using the tool.    For the gastric bypass and loop duodenal switch (RUFUS-S) the risks include but not limited to the following early complications:  Anastomotic leak/peritonitis, Karissa/Alimentary/biliopancreatic limb obstruction, severe & minor wound infection/seroma, and nausea/vomiting.  Late complications can include but are not limited to malnutrition, vitamin deficiencies, frequent loose stools,  stomal stenosis, marginal ulcer, bowel obstruction, intussusception, internal, and incisional hernia.    Regarding the gastric sleeve, there is less long-term outcome data and higher risk of dysphagia and reflux compared to a gastric bypass, as well as risk of internal visceral/organ injury, splenectomy, bleeding, infection, leak (which could require further intervention possible conversion to Karissa-en-Y gastric bypass), stenosis and possibility of regaining weight.    Nighat was counseled regarding diagnostic results, instructions for management, risk factor reductions, prognosis, patient and family education, impressions, risks and benefits of treatment options and importance of compliance with treatment. Total face to face time of the encounter was over 45 minutes and over 30 minutes was spent counseling.     Jagjit Report   As part of this patient's treatment plan I am prescribing controlled substances. The patient has been made aware of appropriate use of  such medications, including potential risk of somnolence, limited ability to drive and /or work safely, and potential for dependence or overdose. It has also been made clear that these medications are for use by this patient only, without concomitant use of alcohol or other substances unless prescribed.    Nighat has completed prescribing agreement detailing terms of continued prescribing of controlled substances, including monitoring DAYNA reports, urine drug screening, and pill counts if necessary. Nighat is aware that inappropriate use will result in cessation of prescribing such medications.    DAYNA report has been reviewed      History and physical exam exhibit continued safe and appropriate use of controlled substances.      Nighat understands the surgical procedures and the different surgical options that are available.  She understands the lifestyle changes that are required after surgery and has agreed to follow the guidelines outlined in the weight management program.  She also expressed understanding of the risks involved and had all of female questions answered and desires to proceed.      Tuyet Neely MD  10/8/2020  Answers for HPI/ROS submitted by the patient on 10/3/2020   What is the primary reason for your visit?: Other  Please describe your symptoms.: Consult before gastric sleeve surgery  Have you had these symptoms before?: No  How long have you been having these symptoms?: 5-7 days

## 2020-10-19 ENCOUNTER — APPOINTMENT (OUTPATIENT)
Dept: PREADMISSION TESTING | Facility: HOSPITAL | Age: 55
End: 2020-10-19

## 2020-10-19 VITALS
BODY MASS INDEX: 66.78 KG/M2 | HEIGHT: 63 IN | DIASTOLIC BLOOD PRESSURE: 69 MMHG | TEMPERATURE: 97.6 F | HEART RATE: 81 BPM | RESPIRATION RATE: 20 BRPM | SYSTOLIC BLOOD PRESSURE: 129 MMHG | OXYGEN SATURATION: 95 %

## 2020-10-19 DIAGNOSIS — E66.01 SUPER-SUPER OBESE (HCC): ICD-10-CM

## 2020-10-19 LAB
ALBUMIN SERPL-MCNC: 4.1 G/DL (ref 3.5–5.2)
ALBUMIN/GLOB SERPL: 1.2 G/DL
ALP SERPL-CCNC: 45 U/L (ref 39–117)
ALT SERPL W P-5'-P-CCNC: 24 U/L (ref 1–33)
ANION GAP SERPL CALCULATED.3IONS-SCNC: 10.8 MMOL/L (ref 5–15)
AST SERPL-CCNC: 22 U/L (ref 1–32)
BILIRUB SERPL-MCNC: 0.4 MG/DL (ref 0–1.2)
BUN SERPL-MCNC: 17 MG/DL (ref 6–20)
BUN/CREAT SERPL: 24.6 (ref 7–25)
CALCIUM SPEC-SCNC: 9.7 MG/DL (ref 8.6–10.5)
CHLORIDE SERPL-SCNC: 99 MMOL/L (ref 98–107)
CO2 SERPL-SCNC: 27.2 MMOL/L (ref 22–29)
CREAT SERPL-MCNC: 0.69 MG/DL (ref 0.57–1)
DEPRECATED RDW RBC AUTO: 42.8 FL (ref 37–54)
ERYTHROCYTE [DISTWIDTH] IN BLOOD BY AUTOMATED COUNT: 13.5 % (ref 12.3–15.4)
GFR SERPL CREATININE-BSD FRML MDRD: 88 ML/MIN/1.73
GLOBULIN UR ELPH-MCNC: 3.3 GM/DL
GLUCOSE SERPL-MCNC: 148 MG/DL (ref 65–99)
HCT VFR BLD AUTO: 44 % (ref 34–46.6)
HGB BLD-MCNC: 14.3 G/DL (ref 12–15.9)
MCH RBC QN AUTO: 28.8 PG (ref 26.6–33)
MCHC RBC AUTO-ENTMCNC: 32.5 G/DL (ref 31.5–35.7)
MCV RBC AUTO: 88.7 FL (ref 79–97)
PLATELET # BLD AUTO: 229 10*3/MM3 (ref 140–450)
PMV BLD AUTO: 10.2 FL (ref 6–12)
POTASSIUM SERPL-SCNC: 3.6 MMOL/L (ref 3.5–5.2)
PROT SERPL-MCNC: 7.4 G/DL (ref 6–8.5)
RBC # BLD AUTO: 4.96 10*6/MM3 (ref 3.77–5.28)
SODIUM SERPL-SCNC: 137 MMOL/L (ref 136–145)
WBC # BLD AUTO: 8.82 10*3/MM3 (ref 3.4–10.8)

## 2020-10-19 PROCEDURE — 36415 COLL VENOUS BLD VENIPUNCTURE: CPT

## 2020-10-19 PROCEDURE — C9803 HOPD COVID-19 SPEC COLLECT: HCPCS | Performed by: SURGERY

## 2020-10-19 PROCEDURE — 85027 COMPLETE CBC AUTOMATED: CPT | Performed by: SURGERY

## 2020-10-19 PROCEDURE — U0004 COV-19 TEST NON-CDC HGH THRU: HCPCS | Performed by: SURGERY

## 2020-10-19 PROCEDURE — 80053 COMPREHEN METABOLIC PANEL: CPT | Performed by: SURGERY

## 2020-10-19 PROCEDURE — 87081 CULTURE SCREEN ONLY: CPT | Performed by: SURGERY

## 2020-10-19 RX ORDER — ACETAMINOPHEN 500 MG
500-1000 TABLET ORAL EVERY 6 HOURS PRN
COMMUNITY

## 2020-10-19 NOTE — DISCHARGE INSTRUCTIONS
Take only the following medications the morning of surgery:  AMLODIPINE AND BREO      Do not take Bariatric Vitamins, Folic Acid, Actigall (if applicable) or Lovenox Injections (if applicable) the morning of surgery.  If you have a history of blood clots or have a BMI greater than 50, Dr. Fong may order Lovenox for after surgery. Do not take Lovenox blood thinner before surgery.      General Instructions:   • Drink one 20 ounce Gatorade G2 the evening before surgery.  Nothing red in color.  • Do not eat solid food after midnight the night before surgery.    • The morning of surgery have another 20 ounce Gatorade G2.  Again, nothing red in color.  • Your drink must be completed 2 hours before your arrival time.   • Do not smoke, use chewing tobacco or drink alcohol the day of surgery.   • Bring any papers given to you in the doctor's office.  Wear clean comfortable clothes.  • Do not wear contact lenses, false eyelashes or make-up.  Bring a case for your glasses.   • Bring crutches or walker if applicable.  • Remove all piercings.  Leave jewelry and any other valuables at home.  • Remove fingernail polish, gel overlays or any artificial nails.  • The Pre-Admission Testing nurse will instruct you to bring medications if unable to obtain an accurate list in Pre-Admission Testing.        Preventing a Surgical Site Infection:  • For 2 to 3 days before surgery, avoid shaving with a razor because the razor can irritate skin and make it easier to develop an infection.    • Any areas of open skin can increase the risk of a post-operative wound infection by allowing bacteria to enter and travel throughout the body.  Notify your surgeon if you have any skin wounds / rashes even if it is not near the expected surgical site.  The area will need assessed to determine if surgery should be delayed until it is healed.  • 2 days prior to surgery, take a shower using a fresh bar of anti-bacterial soap (such as Dial).  Use a clean  washcloth and dry with a clean towel.    • The day prior to surgery, take a shower using a fresh bar of anti-bacterial soap (such as Dial).  Use a clean washcloth and dry with a clean towel.  Sleep in a clean bed with clean clothing.  Do not allow pets to sleep with you.  • The morning of surgery shower using a fresh bar of anti-bacterial soap (such as Dial).  Use a clean washcloth and dry with a clean towel.  Follow the Chlorhexidine instructions below.    CHLORHEXIDINE CLOTH INSTRUCTIONS  The morning of surgery follow these instructions using the Chlorhexidine cloths you've been given.  These steps reduce bacteria on the body.  Do not use the cloths near your eyes, ears mouth, genitalia or on open wounds.  Throw the cloths away after use but do not try to flush them down a toilet.    • Open and remove one cloth at a time from the package.    • Leave the cloth unfolded and begin the bathing.  • Massage the skin with the cloths using gentle pressure to remove bacteria.  Do not scrub harshly.   • Follow the steps below with one 2% CHG cloth per area (6 total cloths).  • One cloth for neck, shoulders and chest.  • One cloth for both arms, hands, fingers and underarms (do underarms last).  • One cloth for the abdomen followed by groin.  • One cloth for right leg and foot including between the toes.  • One cloth for left leg and foot including between the toes.  • The last cloth is to be used for the back of the neck, back and buttocks.    Allow the CHG to air dry 3 minutes on the skin which will give it time to work and decrease the chance of irritation.  The skin may feel sticky until it is dry.  Do not rinse with water or any other liquid or you will lose the beneficial effects of the CHG.  If mild skin irritation occurs, do rinse the skin to remove the CHG.  Report this to the nurse at time of admission.  Do not apply lotions, creams, ointments, deodorants or perfumes after using the clothes. Dress in clean clothes  before coming to the hospital.    • Ask your surgeon if you will be receiving antibiotics prior to surgery.  • Make sure you, your family, and all healthcare providers clean their hands with soap and water or an alcohol based hand  before caring for you or your wound.      Day of surgery: 10/21/2020. [facility] ARRIVAL TIME 830 AM  Your arrival time is approximately two hours before your scheduled surgery time.  Upon arrival, a Pre-op nurse and Anesthesiologist will review your health history, obtain vital signs, and answer questions you may have.  A Pre-op nurse will start an IV and you may receive medication in preparation for surgery, including something to help you relax.    While you are in surgery, your family should notify the waiting room  if they leave the waiting room area and provide a contact phone number.  If you are staying overnight your family can leave your belongings in the car and bring them to your room later.  If applicable, we do ask that you have your C-PAP/BI-PAP machine available. It can be utilized the night of surgery.     Please be aware that surgery does come with discomfort.  We want to make every effort to control your discomfort so please discuss any uncontrolled symptoms with your nurse.   Your doctor will most likely have prescribed pain medications.      If you are going home after surgery you will receive individualized written care instructions before being discharged.  A responsible adult must drive you to and from the hospital on the day of your surgery and stay with you for 24 hours.    If you are staying overnight following surgery, you will be transported to your hospital room following the recovery period.  [facility] has all private rooms.    If you have any questions please call Pre-Admission Testing at [phone].  Deductibles and co-payments are collected on the day of service. Please be prepared to pay the required co-pay, deductible or  deposit on the day of service as defined by your plan.    Patient Education for Self-Quarantine Process    Following your COVID testing, we strongly recommend that you do not leave your home after you have been tested for COVID except to get medical care. This includes not going to work, school or to public areas.  If this is not possible for you to do please limit your activities to only required outings.  Be sure to wear a mask when you are with other people, practice social distancing and wash your hands frequently.      The following items provide additional details to keep you safe.  • Wash your hands with soap and water frequently for at least 20 seconds.   • Avoid touching your eyes, nose and mouth with unwashed hands.  • Do not share anything - utensils, towels, food from the same bowl.   • Have your own utensils, drinking glass, dishes, towels and bedding.   • Do not have visitors.   • Do use FaceTime to stay in touch with family and friends.  • You should stay in a specific room away from others if possible.   • Stay at least 6 feet away from others in the home if you cannot have a dedicated room to yourself.   • Do not snuggle with your pet. While the CDC says there is no evidence that pets can spread COVID-19 or be infected from humans, it is probably best to avoid “petting, snuggling, being kissed or licked and sharing food (during self-quarantine)”, according to the CDC.   • Sanitize household surfaces daily. Include all high touch areas (door handles, light switches, phones, countertops, etc.)  • Do not share a bathroom with others, if possible.   • Wear a mask around others in your home if you are unable to stay in a separate room or 6 feet apart. If  you are unable to wear a mask, have your family member wear a mask if they must be within 6 feet of you.   Call your surgeon immediately if you experience any of the following symptoms:  • Sore Throat  • Shortness of Breath or difficulty  breathing  • Cough  • Chills  • Body soreness or muscle pain  • Headache  • Fever  • New loss of taste or smell  • Do not arrive for your surgery ill.  Your procedure will need to be rescheduled to another time.  You will need to call your physician before the day of surgery to avoid any unnecessary exposure to hospital staff as well as other patients.

## 2020-10-20 LAB
MRSA SPEC QL CULT: NORMAL
SARS-COV-2 RNA RESP QL NAA+PROBE: NOT DETECTED

## 2020-10-21 ENCOUNTER — ANESTHESIA (OUTPATIENT)
Dept: PERIOP | Facility: HOSPITAL | Age: 55
End: 2020-10-21

## 2020-10-21 ENCOUNTER — ANESTHESIA EVENT (OUTPATIENT)
Dept: PERIOP | Facility: HOSPITAL | Age: 55
End: 2020-10-21

## 2020-10-21 ENCOUNTER — HOSPITAL ENCOUNTER (INPATIENT)
Facility: HOSPITAL | Age: 55
LOS: 1 days | Discharge: HOME | End: 2020-10-22
Attending: SURGERY | Admitting: SURGERY

## 2020-10-21 DIAGNOSIS — E66.01 SUPER-SUPER OBESE (HCC): ICD-10-CM

## 2020-10-21 LAB — GLUCOSE BLDC GLUCOMTR-MCNC: 142 MG/DL (ref 70–130)

## 2020-10-21 PROCEDURE — 25010000002 ENOXAPARIN PER 10 MG: Performed by: SURGERY

## 2020-10-21 PROCEDURE — 94640 AIRWAY INHALATION TREATMENT: CPT

## 2020-10-21 PROCEDURE — 43775 LAP SLEEVE GASTRECTOMY: CPT | Performed by: SURGERY

## 2020-10-21 PROCEDURE — 25010000002 ONDANSETRON PER 1 MG: Performed by: NURSE ANESTHETIST, CERTIFIED REGISTERED

## 2020-10-21 PROCEDURE — 25010000002 PROPOFOL 10 MG/ML EMULSION: Performed by: NURSE ANESTHETIST, CERTIFIED REGISTERED

## 2020-10-21 PROCEDURE — 94660 CPAP INITIATION&MGMT: CPT

## 2020-10-21 PROCEDURE — 82962 GLUCOSE BLOOD TEST: CPT

## 2020-10-21 PROCEDURE — 25010000002 METOCLOPRAMIDE PER 10 MG: Performed by: SURGERY

## 2020-10-21 PROCEDURE — 25010000002 FENTANYL CITRATE (PF) 100 MCG/2ML SOLUTION: Performed by: NURSE ANESTHETIST, CERTIFIED REGISTERED

## 2020-10-21 PROCEDURE — 25010000002 DEXAMETHASONE PER 1 MG: Performed by: NURSE ANESTHETIST, CERTIFIED REGISTERED

## 2020-10-21 PROCEDURE — 94799 UNLISTED PULMONARY SVC/PX: CPT

## 2020-10-21 PROCEDURE — 0DB64Z3 EXCISION OF STOMACH, PERCUTANEOUS ENDOSCOPIC APPROACH, VERTICAL: ICD-10-PCS | Performed by: SURGERY

## 2020-10-21 PROCEDURE — 25010000002 HYDROMORPHONE PER 4 MG: Performed by: NURSE ANESTHETIST, CERTIFIED REGISTERED

## 2020-10-21 PROCEDURE — 88307 TISSUE EXAM BY PATHOLOGIST: CPT | Performed by: SURGERY

## 2020-10-21 DEVICE — PERI-STRIPS DRY WITH VERITAS COLLAGEN MATRIX (PSD-V) IS PREPARED FROM DEHYDRATED BOVINE PERICARDIUM PROCURED FROM CATTLE UNDER 30 MONTHS OF AGE IN THE UNITED STATES. ONE (1) TUBE OF PSD GEL (GEL) IS PROVIDED FOR EVERY TWO (2) POUCHES OF PSD-V. THE GEL IS USED TO CREATE A TEMPORARY BOND BETWEEN THE PSD-V BUTTRESS AND THE SURGICAL STAPLER JAWS UNTIL THE STAPLER IS POSITIONED AND FIRED.
Type: IMPLANTABLE DEVICE | Site: STOMACH | Status: FUNCTIONAL
Brand: PERI-STRIPS DRY WITH VERITAS COLLAGEN MATRIX

## 2020-10-21 DEVICE — SEALANT WND FIBRIN TISSEEL PREFIL/SYR/PRIMAFZ 4ML: Type: IMPLANTABLE DEVICE | Site: STOMACH | Status: FUNCTIONAL

## 2020-10-21 DEVICE — ENDOPATH ECHELON ENDOSCOPIC LINEAR CUTTER RELOADS, GREEN, 60MM
Type: IMPLANTABLE DEVICE | Site: STOMACH | Status: FUNCTIONAL
Brand: ECHELON ENDOPATH

## 2020-10-21 DEVICE — ENDOPATH ECHELON ENDOSCOPIC LINEAR CUTTER RELOADS, GOLD, 60MM
Type: IMPLANTABLE DEVICE | Site: STOMACH | Status: FUNCTIONAL
Brand: ECHELON ENDOPATH

## 2020-10-21 RX ORDER — ONDANSETRON 2 MG/ML
INJECTION INTRAMUSCULAR; INTRAVENOUS AS NEEDED
Status: DISCONTINUED | OUTPATIENT
Start: 2020-10-21 | End: 2020-10-21 | Stop reason: SURG

## 2020-10-21 RX ORDER — MIDAZOLAM HYDROCHLORIDE 1 MG/ML
1 INJECTION INTRAMUSCULAR; INTRAVENOUS
Status: DISCONTINUED | OUTPATIENT
Start: 2020-10-21 | End: 2020-10-21 | Stop reason: HOSPADM

## 2020-10-21 RX ORDER — LOSARTAN POTASSIUM 100 MG/1
100 TABLET ORAL DAILY
Status: DISCONTINUED | OUTPATIENT
Start: 2020-10-21 | End: 2020-10-22 | Stop reason: HOSPADM

## 2020-10-21 RX ORDER — ROCURONIUM BROMIDE 10 MG/ML
INJECTION, SOLUTION INTRAVENOUS AS NEEDED
Status: DISCONTINUED | OUTPATIENT
Start: 2020-10-21 | End: 2020-10-21 | Stop reason: SURG

## 2020-10-21 RX ORDER — PROPOFOL 10 MG/ML
VIAL (ML) INTRAVENOUS AS NEEDED
Status: DISCONTINUED | OUTPATIENT
Start: 2020-10-21 | End: 2020-10-21 | Stop reason: SURG

## 2020-10-21 RX ORDER — DIPHENHYDRAMINE HCL 25 MG
25 CAPSULE ORAL
Status: DISCONTINUED | OUTPATIENT
Start: 2020-10-21 | End: 2020-10-21 | Stop reason: HOSPADM

## 2020-10-21 RX ORDER — HYDROMORPHONE HYDROCHLORIDE 2 MG/1
2 TABLET ORAL EVERY 4 HOURS PRN
Status: DISCONTINUED | OUTPATIENT
Start: 2020-10-21 | End: 2020-10-22 | Stop reason: HOSPADM

## 2020-10-21 RX ORDER — PROMETHAZINE HYDROCHLORIDE 25 MG/1
25 TABLET ORAL ONCE AS NEEDED
Status: DISCONTINUED | OUTPATIENT
Start: 2020-10-21 | End: 2020-10-21 | Stop reason: HOSPADM

## 2020-10-21 RX ORDER — BUPIVACAINE HYDROCHLORIDE AND EPINEPHRINE 5; 5 MG/ML; UG/ML
INJECTION, SOLUTION EPIDURAL; INTRACAUDAL; PERINEURAL AS NEEDED
Status: DISCONTINUED | OUTPATIENT
Start: 2020-10-21 | End: 2020-10-21 | Stop reason: HOSPADM

## 2020-10-21 RX ORDER — SCOLOPAMINE TRANSDERMAL SYSTEM 1 MG/1
1 PATCH, EXTENDED RELEASE TRANSDERMAL ONCE
Status: DISCONTINUED | OUTPATIENT
Start: 2020-10-21 | End: 2020-10-21

## 2020-10-21 RX ORDER — CYANOCOBALAMIN 1000 UG/ML
1000 INJECTION, SOLUTION INTRAMUSCULAR; SUBCUTANEOUS ONCE
Status: COMPLETED | OUTPATIENT
Start: 2020-10-22 | End: 2020-10-22

## 2020-10-21 RX ORDER — AMLODIPINE BESYLATE 5 MG/1
5 TABLET ORAL DAILY
Status: DISCONTINUED | OUTPATIENT
Start: 2020-10-22 | End: 2020-10-22 | Stop reason: HOSPADM

## 2020-10-21 RX ORDER — LIDOCAINE HYDROCHLORIDE 10 MG/ML
0.5 INJECTION, SOLUTION EPIDURAL; INFILTRATION; INTRACAUDAL; PERINEURAL ONCE AS NEEDED
Status: DISCONTINUED | OUTPATIENT
Start: 2020-10-21 | End: 2020-10-21 | Stop reason: HOSPADM

## 2020-10-21 RX ORDER — FENTANYL CITRATE 50 UG/ML
50 INJECTION, SOLUTION INTRAMUSCULAR; INTRAVENOUS
Status: DISCONTINUED | OUTPATIENT
Start: 2020-10-21 | End: 2020-10-21 | Stop reason: HOSPADM

## 2020-10-21 RX ORDER — HYDROMORPHONE HYDROCHLORIDE 1 MG/ML
0.5 INJECTION, SOLUTION INTRAMUSCULAR; INTRAVENOUS; SUBCUTANEOUS
Status: DISCONTINUED | OUTPATIENT
Start: 2020-10-21 | End: 2020-10-21 | Stop reason: HOSPADM

## 2020-10-21 RX ORDER — LORAZEPAM 1 MG/1
1 TABLET ORAL EVERY 12 HOURS PRN
Status: DISCONTINUED | OUTPATIENT
Start: 2020-10-21 | End: 2020-10-22 | Stop reason: HOSPADM

## 2020-10-21 RX ORDER — SODIUM CHLORIDE, SODIUM LACTATE, POTASSIUM CHLORIDE, CALCIUM CHLORIDE 600; 310; 30; 20 MG/100ML; MG/100ML; MG/100ML; MG/100ML
150 INJECTION, SOLUTION INTRAVENOUS CONTINUOUS
Status: DISCONTINUED | OUTPATIENT
Start: 2020-10-21 | End: 2020-10-22 | Stop reason: HOSPADM

## 2020-10-21 RX ORDER — DEXAMETHASONE SODIUM PHOSPHATE 10 MG/ML
INJECTION INTRAMUSCULAR; INTRAVENOUS AS NEEDED
Status: DISCONTINUED | OUTPATIENT
Start: 2020-10-21 | End: 2020-10-21 | Stop reason: SURG

## 2020-10-21 RX ORDER — HALOPERIDOL 5 MG/ML
0.5 INJECTION INTRAMUSCULAR EVERY 4 HOURS PRN
Status: DISCONTINUED | OUTPATIENT
Start: 2020-10-21 | End: 2020-10-22 | Stop reason: HOSPADM

## 2020-10-21 RX ORDER — HYDROCODONE BITARTRATE AND ACETAMINOPHEN 7.5; 325 MG/1; MG/1
1 TABLET ORAL EVERY 4 HOURS PRN
Status: DISCONTINUED | OUTPATIENT
Start: 2020-10-21 | End: 2020-10-22 | Stop reason: HOSPADM

## 2020-10-21 RX ORDER — HYDROCHLOROTHIAZIDE 12.5 MG/1
12.5 CAPSULE, GELATIN COATED ORAL DAILY
Status: DISCONTINUED | OUTPATIENT
Start: 2020-10-21 | End: 2020-10-22 | Stop reason: HOSPADM

## 2020-10-21 RX ORDER — SODIUM CHLORIDE 0.9 % (FLUSH) 0.9 %
3-10 SYRINGE (ML) INJECTION AS NEEDED
Status: DISCONTINUED | OUTPATIENT
Start: 2020-10-21 | End: 2020-10-21 | Stop reason: HOSPADM

## 2020-10-21 RX ORDER — HYDRALAZINE HYDROCHLORIDE 20 MG/ML
5 INJECTION INTRAMUSCULAR; INTRAVENOUS
Status: DISCONTINUED | OUTPATIENT
Start: 2020-10-21 | End: 2020-10-21 | Stop reason: HOSPADM

## 2020-10-21 RX ORDER — FAMOTIDINE 20 MG/1
20 TABLET, FILM COATED ORAL DAILY
Status: DISCONTINUED | OUTPATIENT
Start: 2020-10-21 | End: 2020-10-22 | Stop reason: HOSPADM

## 2020-10-21 RX ORDER — SODIUM CHLORIDE 0.9 % (FLUSH) 0.9 %
3 SYRINGE (ML) INJECTION EVERY 12 HOURS SCHEDULED
Status: DISCONTINUED | OUTPATIENT
Start: 2020-10-21 | End: 2020-10-22 | Stop reason: HOSPADM

## 2020-10-21 RX ORDER — CHLORHEXIDINE GLUCONATE 0.12 MG/ML
15 RINSE ORAL SEE ADMIN INSTRUCTIONS
Status: COMPLETED | OUTPATIENT
Start: 2020-10-21 | End: 2020-10-21

## 2020-10-21 RX ORDER — ALBUTEROL SULFATE 2.5 MG/3ML
2.5 SOLUTION RESPIRATORY (INHALATION)
Status: DISCONTINUED | OUTPATIENT
Start: 2020-10-21 | End: 2020-10-22 | Stop reason: HOSPADM

## 2020-10-21 RX ORDER — BUDESONIDE AND FORMOTEROL FUMARATE DIHYDRATE 80; 4.5 UG/1; UG/1
2 AEROSOL RESPIRATORY (INHALATION)
Status: DISCONTINUED | OUTPATIENT
Start: 2020-10-21 | End: 2020-10-22 | Stop reason: HOSPADM

## 2020-10-21 RX ORDER — DIPHENHYDRAMINE HYDROCHLORIDE 50 MG/ML
12.5 INJECTION INTRAMUSCULAR; INTRAVENOUS
Status: DISCONTINUED | OUTPATIENT
Start: 2020-10-21 | End: 2020-10-21 | Stop reason: HOSPADM

## 2020-10-21 RX ORDER — DULOXETIN HYDROCHLORIDE 60 MG/1
60 CAPSULE, DELAYED RELEASE ORAL DAILY
Status: DISCONTINUED | OUTPATIENT
Start: 2020-10-21 | End: 2020-10-22 | Stop reason: HOSPADM

## 2020-10-21 RX ORDER — ONDANSETRON 4 MG/1
4 TABLET, ORALLY DISINTEGRATING ORAL EVERY 4 HOURS PRN
Status: DISCONTINUED | OUTPATIENT
Start: 2020-10-21 | End: 2020-10-22 | Stop reason: HOSPADM

## 2020-10-21 RX ORDER — ONDANSETRON 4 MG/1
4 TABLET, FILM COATED ORAL EVERY 4 HOURS PRN
Status: DISCONTINUED | OUTPATIENT
Start: 2020-10-21 | End: 2020-10-22 | Stop reason: HOSPADM

## 2020-10-21 RX ORDER — FLUTICASONE PROPIONATE 50 MCG
1 SPRAY, SUSPENSION (ML) NASAL 2 TIMES DAILY PRN
Status: DISCONTINUED | OUTPATIENT
Start: 2020-10-21 | End: 2020-10-22 | Stop reason: HOSPADM

## 2020-10-21 RX ORDER — HYDROCODONE BITARTRATE AND ACETAMINOPHEN 7.5; 325 MG/1; MG/1
1 TABLET ORAL ONCE AS NEEDED
Status: DISCONTINUED | OUTPATIENT
Start: 2020-10-21 | End: 2020-10-21 | Stop reason: HOSPADM

## 2020-10-21 RX ORDER — ONDANSETRON 2 MG/ML
4 INJECTION INTRAMUSCULAR; INTRAVENOUS EVERY 4 HOURS PRN
Status: DISCONTINUED | OUTPATIENT
Start: 2020-10-21 | End: 2020-10-22 | Stop reason: HOSPADM

## 2020-10-21 RX ORDER — LABETALOL HYDROCHLORIDE 5 MG/ML
5 INJECTION, SOLUTION INTRAVENOUS
Status: DISCONTINUED | OUTPATIENT
Start: 2020-10-21 | End: 2020-10-21 | Stop reason: HOSPADM

## 2020-10-21 RX ORDER — MIRTAZAPINE 15 MG/1
15 TABLET, ORALLY DISINTEGRATING ORAL NIGHTLY
Status: DISCONTINUED | OUTPATIENT
Start: 2020-10-21 | End: 2020-10-22 | Stop reason: HOSPADM

## 2020-10-21 RX ORDER — HYDROMORPHONE HYDROCHLORIDE 1 MG/ML
0.5 INJECTION, SOLUTION INTRAMUSCULAR; INTRAVENOUS; SUBCUTANEOUS
Status: DISCONTINUED | OUTPATIENT
Start: 2020-10-21 | End: 2020-10-22 | Stop reason: HOSPADM

## 2020-10-21 RX ORDER — OXYCODONE AND ACETAMINOPHEN 7.5; 325 MG/1; MG/1
1 TABLET ORAL ONCE AS NEEDED
Status: DISCONTINUED | OUTPATIENT
Start: 2020-10-21 | End: 2020-10-21 | Stop reason: HOSPADM

## 2020-10-21 RX ORDER — FENTANYL CITRATE 50 UG/ML
INJECTION, SOLUTION INTRAMUSCULAR; INTRAVENOUS AS NEEDED
Status: DISCONTINUED | OUTPATIENT
Start: 2020-10-21 | End: 2020-10-21 | Stop reason: SURG

## 2020-10-21 RX ORDER — FAMOTIDINE 10 MG/ML
20 INJECTION, SOLUTION INTRAVENOUS ONCE
Status: COMPLETED | OUTPATIENT
Start: 2020-10-21 | End: 2020-10-21

## 2020-10-21 RX ORDER — FLUMAZENIL 0.1 MG/ML
0.2 INJECTION INTRAVENOUS AS NEEDED
Status: DISCONTINUED | OUTPATIENT
Start: 2020-10-21 | End: 2020-10-21 | Stop reason: HOSPADM

## 2020-10-21 RX ORDER — NALOXONE HCL 0.4 MG/ML
0.2 VIAL (ML) INJECTION AS NEEDED
Status: DISCONTINUED | OUTPATIENT
Start: 2020-10-21 | End: 2020-10-21 | Stop reason: HOSPADM

## 2020-10-21 RX ORDER — LORAZEPAM 2 MG/ML
1 INJECTION INTRAMUSCULAR EVERY 12 HOURS PRN
Status: DISCONTINUED | OUTPATIENT
Start: 2020-10-21 | End: 2020-10-22 | Stop reason: HOSPADM

## 2020-10-21 RX ORDER — MORPHINE SULFATE 2 MG/ML
2 INJECTION, SOLUTION INTRAMUSCULAR; INTRAVENOUS
Status: DISCONTINUED | OUTPATIENT
Start: 2020-10-21 | End: 2020-10-22 | Stop reason: HOSPADM

## 2020-10-21 RX ORDER — METOCLOPRAMIDE HYDROCHLORIDE 5 MG/ML
10 INJECTION INTRAMUSCULAR; INTRAVENOUS ONCE
Status: COMPLETED | OUTPATIENT
Start: 2020-10-21 | End: 2020-10-21

## 2020-10-21 RX ORDER — EPHEDRINE SULFATE 50 MG/ML
5 INJECTION, SOLUTION INTRAVENOUS ONCE AS NEEDED
Status: DISCONTINUED | OUTPATIENT
Start: 2020-10-21 | End: 2020-10-21 | Stop reason: HOSPADM

## 2020-10-21 RX ORDER — ONDANSETRON 2 MG/ML
4 INJECTION INTRAMUSCULAR; INTRAVENOUS ONCE AS NEEDED
Status: DISCONTINUED | OUTPATIENT
Start: 2020-10-21 | End: 2020-10-21 | Stop reason: HOSPADM

## 2020-10-21 RX ORDER — SODIUM CHLORIDE 0.9 % (FLUSH) 0.9 %
3 SYRINGE (ML) INJECTION EVERY 12 HOURS SCHEDULED
Status: DISCONTINUED | OUTPATIENT
Start: 2020-10-21 | End: 2020-10-21 | Stop reason: HOSPADM

## 2020-10-21 RX ORDER — LABETALOL HYDROCHLORIDE 5 MG/ML
10 INJECTION, SOLUTION INTRAVENOUS
Status: DISCONTINUED | OUTPATIENT
Start: 2020-10-21 | End: 2020-10-22 | Stop reason: HOSPADM

## 2020-10-21 RX ORDER — PROMETHAZINE HYDROCHLORIDE 25 MG/1
25 SUPPOSITORY RECTAL ONCE AS NEEDED
Status: DISCONTINUED | OUTPATIENT
Start: 2020-10-21 | End: 2020-10-21 | Stop reason: HOSPADM

## 2020-10-21 RX ORDER — NITROGLYCERIN 0.4 MG/1
0.4 TABLET SUBLINGUAL
Status: DISCONTINUED | OUTPATIENT
Start: 2020-10-21 | End: 2020-10-22 | Stop reason: HOSPADM

## 2020-10-21 RX ORDER — SODIUM CHLORIDE, SODIUM LACTATE, POTASSIUM CHLORIDE, CALCIUM CHLORIDE 600; 310; 30; 20 MG/100ML; MG/100ML; MG/100ML; MG/100ML
9 INJECTION, SOLUTION INTRAVENOUS CONTINUOUS
Status: DISCONTINUED | OUTPATIENT
Start: 2020-10-21 | End: 2020-10-21

## 2020-10-21 RX ORDER — CEFAZOLIN SODIUM IN 0.9 % NACL 3 G/100 ML
3 INTRAVENOUS SOLUTION, PIGGYBACK (ML) INTRAVENOUS
Status: COMPLETED | OUTPATIENT
Start: 2020-10-21 | End: 2020-10-21

## 2020-10-21 RX ORDER — ACETAMINOPHEN 160 MG/5ML
975 SOLUTION ORAL ONCE
Status: COMPLETED | OUTPATIENT
Start: 2020-10-21 | End: 2020-10-21

## 2020-10-21 RX ORDER — NALOXONE HCL 0.4 MG/ML
0.1 VIAL (ML) INJECTION
Status: DISCONTINUED | OUTPATIENT
Start: 2020-10-21 | End: 2020-10-22 | Stop reason: HOSPADM

## 2020-10-21 RX ORDER — DIPHENHYDRAMINE HYDROCHLORIDE 50 MG/ML
25 INJECTION INTRAMUSCULAR; INTRAVENOUS EVERY 4 HOURS PRN
Status: DISCONTINUED | OUTPATIENT
Start: 2020-10-21 | End: 2020-10-22 | Stop reason: HOSPADM

## 2020-10-21 RX ORDER — FAMOTIDINE 10 MG/ML
20 INJECTION, SOLUTION INTRAVENOUS EVERY 12 HOURS SCHEDULED
Status: DISCONTINUED | OUTPATIENT
Start: 2020-10-21 | End: 2020-10-22 | Stop reason: HOSPADM

## 2020-10-21 RX ORDER — EPHEDRINE SULFATE 50 MG/ML
INJECTION, SOLUTION INTRAVENOUS AS NEEDED
Status: DISCONTINUED | OUTPATIENT
Start: 2020-10-21 | End: 2020-10-21 | Stop reason: SURG

## 2020-10-21 RX ORDER — ACETAMINOPHEN 500 MG
1000 TABLET ORAL EVERY 6 HOURS
Status: DISCONTINUED | OUTPATIENT
Start: 2020-10-21 | End: 2020-10-22 | Stop reason: HOSPADM

## 2020-10-21 RX ORDER — METOCLOPRAMIDE HYDROCHLORIDE 5 MG/ML
10 INJECTION INTRAMUSCULAR; INTRAVENOUS EVERY 6 HOURS
Status: DISCONTINUED | OUTPATIENT
Start: 2020-10-21 | End: 2020-10-22 | Stop reason: HOSPADM

## 2020-10-21 RX ORDER — GABAPENTIN 300 MG/1
300 CAPSULE ORAL EVERY 8 HOURS SCHEDULED
Status: DISCONTINUED | OUTPATIENT
Start: 2020-10-21 | End: 2020-10-22 | Stop reason: HOSPADM

## 2020-10-21 RX ORDER — METOPROLOL SUCCINATE 50 MG/1
50 TABLET, EXTENDED RELEASE ORAL EVERY EVENING
Status: DISCONTINUED | OUTPATIENT
Start: 2020-10-21 | End: 2020-10-22 | Stop reason: HOSPADM

## 2020-10-21 RX ORDER — LIDOCAINE HYDROCHLORIDE 20 MG/ML
INJECTION, SOLUTION INFILTRATION; PERINEURAL AS NEEDED
Status: DISCONTINUED | OUTPATIENT
Start: 2020-10-21 | End: 2020-10-21 | Stop reason: SURG

## 2020-10-21 RX ORDER — NALOXONE HCL 0.4 MG/ML
0.4 VIAL (ML) INJECTION
Status: DISCONTINUED | OUTPATIENT
Start: 2020-10-21 | End: 2020-10-22 | Stop reason: HOSPADM

## 2020-10-21 RX ORDER — PANTOPRAZOLE SODIUM 40 MG/10ML
40 INJECTION, POWDER, LYOPHILIZED, FOR SOLUTION INTRAVENOUS ONCE
Status: COMPLETED | OUTPATIENT
Start: 2020-10-21 | End: 2020-10-21

## 2020-10-21 RX ORDER — SODIUM CHLORIDE, SODIUM LACTATE, POTASSIUM CHLORIDE, CALCIUM CHLORIDE 600; 310; 30; 20 MG/100ML; MG/100ML; MG/100ML; MG/100ML
100 INJECTION, SOLUTION INTRAVENOUS CONTINUOUS
Status: DISCONTINUED | OUTPATIENT
Start: 2020-10-21 | End: 2020-10-22 | Stop reason: HOSPADM

## 2020-10-21 RX ADMIN — EPHEDRINE SULFATE 10 MG: 50 INJECTION INTRAVENOUS at 13:05

## 2020-10-21 RX ADMIN — DEXAMETHASONE SODIUM PHOSPHATE 4 MG: 10 INJECTION INTRAMUSCULAR; INTRAVENOUS at 12:52

## 2020-10-21 RX ADMIN — HYDROMORPHONE HYDROCHLORIDE 0.5 MG: 1 INJECTION, SOLUTION INTRAMUSCULAR; INTRAVENOUS; SUBCUTANEOUS at 14:33

## 2020-10-21 RX ADMIN — THIAMINE HYDROCHLORIDE 250 ML/HR: 100 INJECTION, SOLUTION INTRAMUSCULAR; INTRAVENOUS at 23:34

## 2020-10-21 RX ADMIN — LOSARTAN POTASSIUM 100 MG: 100 TABLET, FILM COATED ORAL at 20:25

## 2020-10-21 RX ADMIN — LINAGLIPTIN 5 MG: 5 TABLET, FILM COATED ORAL at 20:25

## 2020-10-21 RX ADMIN — FAMOTIDINE 20 MG: 10 INJECTION INTRAVENOUS at 23:34

## 2020-10-21 RX ADMIN — PANTOPRAZOLE SODIUM 40 MG: 40 INJECTION, POWDER, FOR SOLUTION INTRAVENOUS at 09:18

## 2020-10-21 RX ADMIN — SODIUM CHLORIDE, POTASSIUM CHLORIDE, SODIUM LACTATE AND CALCIUM CHLORIDE 500 ML: 600; 310; 30; 20 INJECTION, SOLUTION INTRAVENOUS at 09:14

## 2020-10-21 RX ADMIN — SODIUM CHLORIDE, PRESERVATIVE FREE 3 ML: 5 INJECTION INTRAVENOUS at 20:27

## 2020-10-21 RX ADMIN — FAMOTIDINE 20 MG: 10 INJECTION INTRAVENOUS at 09:53

## 2020-10-21 RX ADMIN — ROCURONIUM BROMIDE 50 MG: 10 INJECTION, SOLUTION INTRAVENOUS at 12:43

## 2020-10-21 RX ADMIN — ONDANSETRON HYDROCHLORIDE 4 MG: 2 SOLUTION INTRAMUSCULAR; INTRAVENOUS at 13:21

## 2020-10-21 RX ADMIN — ACETAMINOPHEN 1000 MG: 500 TABLET, FILM COATED ORAL at 20:24

## 2020-10-21 RX ADMIN — ACETAMINOPHEN ORAL SOLUTION 975 MG: 325 SOLUTION ORAL at 10:42

## 2020-10-21 RX ADMIN — DULOXETINE HYDROCHLORIDE 60 MG: 60 CAPSULE, DELAYED RELEASE ORAL at 20:25

## 2020-10-21 RX ADMIN — SUGAMMADEX 200 MG: 100 INJECTION, SOLUTION INTRAVENOUS at 13:29

## 2020-10-21 RX ADMIN — ENOXAPARIN SODIUM 40 MG: 40 INJECTION SUBCUTANEOUS at 12:26

## 2020-10-21 RX ADMIN — METOPROLOL SUCCINATE 50 MG: 50 TABLET, EXTENDED RELEASE ORAL at 20:24

## 2020-10-21 RX ADMIN — HYDROCHLOROTHIAZIDE 12.5 MG: 12.5 CAPSULE ORAL at 20:25

## 2020-10-21 RX ADMIN — GABAPENTIN 300 MG: 300 CAPSULE ORAL at 23:34

## 2020-10-21 RX ADMIN — CEFAZOLIN 3 G: 1 INJECTION, POWDER, FOR SOLUTION INTRAMUSCULAR; INTRAVENOUS; PARENTERAL at 12:52

## 2020-10-21 RX ADMIN — SODIUM CHLORIDE, POTASSIUM CHLORIDE, SODIUM LACTATE AND CALCIUM CHLORIDE: 600; 310; 30; 20 INJECTION, SOLUTION INTRAVENOUS at 13:07

## 2020-10-21 RX ADMIN — METOCLOPRAMIDE HYDROCHLORIDE 10 MG: 5 INJECTION INTRAMUSCULAR; INTRAVENOUS at 10:04

## 2020-10-21 RX ADMIN — PROPOFOL 250 MG: 10 INJECTION, EMULSION INTRAVENOUS at 12:43

## 2020-10-21 RX ADMIN — CHLORHEXIDINE GLUCONATE 15 ML: 1.2 RINSE ORAL at 09:38

## 2020-10-21 RX ADMIN — MIRTAZAPINE 15 MG: 15 TABLET, ORALLY DISINTEGRATING ORAL at 20:25

## 2020-10-21 RX ADMIN — PROPOFOL 25 MCG/KG/MIN: 10 INJECTION, EMULSION INTRAVENOUS at 12:51

## 2020-10-21 RX ADMIN — FENTANYL CITRATE 100 MCG: 50 INJECTION INTRAMUSCULAR; INTRAVENOUS at 12:41

## 2020-10-21 RX ADMIN — LIDOCAINE HYDROCHLORIDE 80 MG: 20 INJECTION, SOLUTION INFILTRATION; PERINEURAL at 12:43

## 2020-10-21 RX ADMIN — ALBUTEROL SULFATE 2.5 MG: 2.5 SOLUTION RESPIRATORY (INHALATION) at 19:22

## 2020-10-21 RX ADMIN — SCOPALAMINE 1 PATCH: 1 PATCH, EXTENDED RELEASE TRANSDERMAL at 09:14

## 2020-10-21 RX ADMIN — FENTANYL CITRATE 50 MCG: 50 INJECTION INTRAMUSCULAR; INTRAVENOUS at 13:42

## 2020-10-21 RX ADMIN — METOCLOPRAMIDE HYDROCHLORIDE 10 MG: 5 INJECTION INTRAMUSCULAR; INTRAVENOUS at 20:26

## 2020-10-21 NOTE — ANESTHESIA PREPROCEDURE EVALUATION
Anesthesia Evaluation     Patient summary reviewed and Nursing notes reviewed   no history of anesthetic complications:  NPO Solid Status: > 8 hours  NPO Liquid Status: > 8 hours           Airway   Mallampati: I  TM distance: >3 FB  Neck ROM: full  No difficulty expected  Dental - normal exam     Pulmonary - normal exam   (+) sleep apnea,   (-) COPD, asthma, not a smoker  Cardiovascular - normal exam    ECG reviewed  Patient on routine beta blocker and Beta blocker given within 24 hours of surgery    (+) hypertension, hyperlipidemia,   (-) pacemaker, valvular problems/murmurs, past MI, CAD, dysrhythmias, angina, CHF, MCKOEN, cardiac stents, CABG, PVD, DVT    ROS comment: · Findings consistent with a normal ECG stress test.  · Left ventricular ejection fraction is hyperdynamic (Calculated EF > 70%).  · Diaphragmatic attenuation artifact is present.  · Myocardial perfusion imaging indicates a small-to-medium-sized infarct located in the inferior wall and apex with no significant ischemia noted.  · Impressions are consistent with an intermediate risk study    Neuro/Psych  (+) psychiatric history Depression,     (-) seizures, TIA, CVA, weakness, numbness, dementia    ROS Comment: Fibromyalgia   GI/Hepatic/Renal/Endo    (+) morbid obesity, GERD,    (-)  obesity, liver disease, no renal disease, diabetes, no thyroid disorder    Musculoskeletal     (-) back pain, neck pain, neck stiffness, chronic pain  Abdominal    Substance History   (-) alcohol use, drug use     OB/GYN    (-)  Pregnant        Other   arthritis, autoimmune disease rheumatoid arthritis,      (-) blood dyscrasia, history of cancer, chronic steroid use                  Anesthesia Plan    ASA 3     general     intravenous induction     Anesthetic plan, all risks, benefits, and alternatives have been provided, discussed and informed consent has been obtained with: patient.

## 2020-10-21 NOTE — OP NOTE
PREOPERATIVE DIAGNOSIS:  Morbid obesity with multiple comorbidities as referenced in the most recent history and physical.    POSTOPERATIVE DIAGNOSIS:  Morbid obesity with multiple comorbidities as referenced in the most recent history and physical.    PROCEDURES PERFORMED:  1.  Laparoscopic sleeve gastrectomy with a 25 cm Standard Bariatric Clamp  2.  Tisseel application.     SURGEON:  Tuyet Neely MD FACS, FAMBS    ASSISTANT:  Johny Fong MD FACS, MELLY    Surgery assisted and facilitated by a certified physician assistant, who directly resulted in a decreased operative time, anesthetic time, wound exposure, and possibly of an operative wound infection, thereby decreasing patient morbidity and ultimately total expenditures.  The surgical assistant assisted in placement of trochars, take down of the gastrocolic omentum, short gastric vessels and dissection at the angle of His.  Also assisted in retraction of the stomach during stapling so as not to kink the gastric sleeve.  Also assisted in removing of the gastric specimen, closure of the fascial defect as well as closure of the skin incisions.    ANESTHESIA:  General endotracheal.    ESTIMATED BLOOD LOSS:   Less than 25 mL unless dictated below.    FLUIDS:  Crystalloids.    SPECIMENS:  Gastric remnant    DRAINS:  None.    COUNTS:  Correct.    COMPLICATIONS:  None.    INDICATIONS:  This patient with morbid obesity and associated comorbidities presents for elective laparoscopic, possible open sleeve gastrectomy.  The patient has received medical clearance to proceed.  The patient has undergone our extensive educational process and consent process and wishes to proceed.        DESCRIPTION OF PROCEDURE:  The patient was brought to the operating room and placed supine upon the operating room table. SCD hose were placed.  The patient underwent uneventful general endotracheal anesthesia per the anesthesiology staff. The abdomen was prepped with ChloraPrep and draped in  the usual sterile fashion.  An Ioban was used as well if not allergic.  Depending on the technique to size the gastric sleeve, anesthesia staff either passed a 18-Fijian gastric tube or a 36-Fijian ViSiGi bougie into the stomach to decompress and then was pulled back to the esophagus.     A 5-10 mm transverse incision was made a 5 to 7 cm inferior to the left subcostal margin and about 5 to 7 cm to the left of midline.  The peritoneal cavity was entered under direct camera visualization using a 5 or 10 mm 0° laparoscope and an Optiview trocar.  The abdomen was then insufflated to a pressure of 15-16 mmHg with CO2 gas.  Exploratory laparoscopy revealed no evidence of injury from the entrance technique and no significant abnormalities unless addendum dictated below.  An angled laparoscope was then used.  The patient was placed in reverse Trendelenburg position.  Under direct camera visualization a 5 mm trocar was placed in the left lateral subcostal position.  A 12 mm trocar was placed in the right midabdominal position.  A 12 mm trocar was placed in the supraumbilical position. A Danya retractor was placed through an epigastric incision and used to elevate the left lobe of the liver.  The fat pad was elevated and the left kassandra exposed.  At this point, approximately half-way along the greater curvature, the gastrocolic omentum was divided with the Enseal and this proceeded superiorly to the angle of His taking down the short gastric vessels.  All posterior attachments of the lesser sac and posterior aspect of the stomach to the pancreas were taken down as well.  The left kassandra was exposed along its length.  Dissection then proceeded medially taking down the greater curvature with an Enseal until just proximal to the pylorus.  The 18-Fijian orogastric tube was passed back down into the stomach by anesthesia a 25 cm Standard Bariatric Clamp was then positioned with the tip 1 cm to the left of the GE junction, 3cm from  angularis incisura and 6 cm proximal to the pyloris.  The 1st load was a green tissue load on the [place] Flex stapler with Veritas Antoinette-Strip and this was placed 6 cm proximal to the pylorus and up against the clamp pulling it anteriorly and posteriorly up against the bougie but paying close attention not to narrow the incisura.  The next 3 loads were gold with absorbable Veritas Antoinette-Strips depending on the size of the stomach. Careful attention was made to stay about 1 cm from the esophagus. Areas of the reinforced staple line were oversewn with absorbable sutures as needed for bleeding or questionable staple lines. The clamp was withdrawn.  At this point, the gastrectomy specimen was withdrawn through the 12 mm trocar site incision. The specimen staple line was inspected and was intact.  The specimen was then sent off to pathology.  At this point, the sleeve was submerged under saline and using the orogastric tube to insufflate the stomach, a leak test was performed.  This revealed the sleeve to be watertight, no air bubbles, no leak, and no bleeding seen from the staple lines and no significant abnormalities.  Irrigation fluid from the abdomen was then suctioned.  The gastric sleeve staple line was then treated with 4 mL Tisseel fibrin glue.  I did use 2-0 Vicryl and placed an intracorporeal single stitch from the gastric sleeve staple line opposite the angularis incisura to some of the gastrocolic ligament.  I did this to help straighten out the sleeve and anchor it inferiorly.  The fascia at the 12 mm trocar site incision that was used for extraction was closed with a single 0 Vicryl suture in a figure-of-eight fashion placed under direct laparoscopic camera visualization with a suture passer and tying the knot extracorporeally.  The fascia in the area was infiltrated with local anesthesia. All incisions were then infiltrated with local anesthetic. The remaining trocars were removed under direct camera  visualization with no bleeding noted from their sites.  The abdomen was desufflated of gas. The skin in each incision was closed using 4-0 antibiotic impregnated Monocryl in a subcuticular fashion followed by Dermabond.  The patient tolerated the procedure well without complication and was taken to the recovery room in stable condition.  All sponge, needle and instrument counts were correct.     The hiatus was checked for a hernia and no hernia was detected.    .lrg

## 2020-10-21 NOTE — ANESTHESIA PROCEDURE NOTES
Airway  Urgency: elective    Date/Time: 10/21/2020 12:46 PM  Airway not difficult    General Information and Staff    Patient location during procedure: OR  Anesthesiologist: Kaylyn Guillermo CRNA  CRNA: Kaylyn Guillermo CRNA    Indications and Patient Condition  Indications for airway management: airway protection    Preoxygenated: yes  MILS not maintained throughout  Mask difficulty assessment: 2 - vent by mask + OA or adjuvant +/- NMBA    Final Airway Details  Final airway type: endotracheal airway      Successful airway: ETT  Cuffed: yes   Successful intubation technique: direct laryngoscopy  Endotracheal tube insertion site: oral  Blade: Kris  Blade size: 3  ETT size (mm): 7.0  Cormack-Lehane Classification: grade I - full view of glottis  Placement verified by: chest auscultation and capnometry   Cuff volume (mL): 6  Measured from: lips  ETT/EBT  to lips (cm): 22  Number of attempts at approach: 1  Assessment: lips, teeth, and gum same as pre-op and atraumatic intubation    Additional Comments  Intubated by JOSIANE Levine. Dentition intact and unchanged. CBEBS.  +ETCO2.

## 2020-10-21 NOTE — ANESTHESIA POSTPROCEDURE EVALUATION
Patient: Nighat May    Procedure Summary     Date: 10/21/20 Room / Location:  [facility] OR  /  [facility]    Anesthesia Start: 1237 Anesthesia Stop: 1345    Procedure: GASTRIC SLEEVE LAPAROSCOPIC (N/A Abdomen) Diagnosis:       Super-super obese (CMS/HCC)      (Super-super obese (CMS/HCC) [E66.01])    Surgeon: Tuyet Neely MD Provider: Jl Wood MD    Anesthesia Type: general ASA Status: 3          Anesthesia Type: general    Vitals  Vitals Value Taken Time   /56 10/21/20 1430   Temp 36.4 °C (97.6 °F) 10/21/20 1341   Pulse 75 10/21/20 1438   Resp 16 10/21/20 1415   SpO2 100 % 10/21/20 1438   Vitals shown include unvalidated device data.        Post Anesthesia Care and Evaluation    Patient location during evaluation: bedside  Patient participation: complete - patient participated  Level of consciousness: awake and alert  Pain management: adequate  Airway patency: patent  Anesthetic complications: No anesthetic complications    Cardiovascular status: acceptable  Respiratory status: acceptable  Hydration status: acceptable    Comments: /62   Pulse 76   Temp 36.4 °C (97.6 °F) (Temporal)   Resp 16   SpO2 100%

## 2020-10-22 VITALS
SYSTOLIC BLOOD PRESSURE: 130 MMHG | DIASTOLIC BLOOD PRESSURE: 66 MMHG | OXYGEN SATURATION: 99 % | WEIGHT: 293 LBS | TEMPERATURE: 98.1 F | RESPIRATION RATE: 18 BRPM | HEIGHT: 63 IN | BODY MASS INDEX: 51.91 KG/M2 | HEART RATE: 58 BPM

## 2020-10-22 LAB
ALBUMIN SERPL-MCNC: 3.4 G/DL (ref 3.5–5.2)
ALBUMIN/GLOB SERPL: 1 G/DL
ALP SERPL-CCNC: 40 U/L (ref 39–117)
ALT SERPL W P-5'-P-CCNC: 26 U/L (ref 1–33)
ANION GAP SERPL CALCULATED.3IONS-SCNC: 9.5 MMOL/L (ref 5–15)
AST SERPL-CCNC: 22 U/L (ref 1–32)
BASOPHILS # BLD AUTO: 0.02 10*3/MM3 (ref 0–0.2)
BASOPHILS NFR BLD AUTO: 0.2 % (ref 0–1.5)
BILIRUB SERPL-MCNC: 0.4 MG/DL (ref 0–1.2)
BUN SERPL-MCNC: 8 MG/DL (ref 6–20)
BUN/CREAT SERPL: 12.9 (ref 7–25)
CALCIUM SPEC-SCNC: 9 MG/DL (ref 8.6–10.5)
CHLORIDE SERPL-SCNC: 103 MMOL/L (ref 98–107)
CO2 SERPL-SCNC: 26.5 MMOL/L (ref 22–29)
CREAT SERPL-MCNC: 0.62 MG/DL (ref 0.57–1)
DEPRECATED RDW RBC AUTO: 43.9 FL (ref 37–54)
EOSINOPHIL # BLD AUTO: 0 10*3/MM3 (ref 0–0.4)
EOSINOPHIL NFR BLD AUTO: 0 % (ref 0.3–6.2)
ERYTHROCYTE [DISTWIDTH] IN BLOOD BY AUTOMATED COUNT: 13.3 % (ref 12.3–15.4)
GFR SERPL CREATININE-BSD FRML MDRD: 99 ML/MIN/1.73
GLOBULIN UR ELPH-MCNC: 3.4 GM/DL
GLUCOSE SERPL-MCNC: 163 MG/DL (ref 65–99)
HCT VFR BLD AUTO: 40.2 % (ref 34–46.6)
HGB BLD-MCNC: 13.3 G/DL (ref 12–15.9)
IMM GRANULOCYTES # BLD AUTO: 0.05 10*3/MM3 (ref 0–0.05)
IMM GRANULOCYTES NFR BLD AUTO: 0.4 % (ref 0–0.5)
LAB AP CASE REPORT: NORMAL
LYMPHOCYTES # BLD AUTO: 1.02 10*3/MM3 (ref 0.7–3.1)
LYMPHOCYTES NFR BLD AUTO: 8.6 % (ref 19.6–45.3)
MAGNESIUM SERPL-MCNC: 2.2 MG/DL (ref 1.6–2.6)
MCH RBC QN AUTO: 29.8 PG (ref 26.6–33)
MCHC RBC AUTO-ENTMCNC: 33.1 G/DL (ref 31.5–35.7)
MCV RBC AUTO: 90.1 FL (ref 79–97)
MONOCYTES # BLD AUTO: 0.59 10*3/MM3 (ref 0.1–0.9)
MONOCYTES NFR BLD AUTO: 5 % (ref 5–12)
NEUTROPHILS NFR BLD AUTO: 10.14 10*3/MM3 (ref 1.7–7)
NEUTROPHILS NFR BLD AUTO: 85.8 % (ref 42.7–76)
NRBC BLD AUTO-RTO: 0 /100 WBC (ref 0–0.2)
PATH REPORT.FINAL DX SPEC: NORMAL
PATH REPORT.GROSS SPEC: NORMAL
PHOSPHATE SERPL-MCNC: 2.9 MG/DL (ref 2.5–4.5)
PLATELET # BLD AUTO: 206 10*3/MM3 (ref 140–450)
PMV BLD AUTO: 10.2 FL (ref 6–12)
POTASSIUM SERPL-SCNC: 3.8 MMOL/L (ref 3.5–5.2)
PROT SERPL-MCNC: 6.8 G/DL (ref 6–8.5)
RBC # BLD AUTO: 4.46 10*6/MM3 (ref 3.77–5.28)
SODIUM SERPL-SCNC: 139 MMOL/L (ref 136–145)
WBC # BLD AUTO: 11.82 10*3/MM3 (ref 3.4–10.8)

## 2020-10-22 PROCEDURE — 25010000002 ENOXAPARIN PER 10 MG: Performed by: SURGERY

## 2020-10-22 PROCEDURE — 25010000002 CYANOCOBALAMIN PER 1000 MCG: Performed by: SURGERY

## 2020-10-22 PROCEDURE — 25010000002 THIAMINE PER 100 MG: Performed by: SURGERY

## 2020-10-22 PROCEDURE — 84100 ASSAY OF PHOSPHORUS: CPT | Performed by: SURGERY

## 2020-10-22 PROCEDURE — 99024 POSTOP FOLLOW-UP VISIT: CPT | Performed by: SURGERY

## 2020-10-22 PROCEDURE — 94799 UNLISTED PULMONARY SVC/PX: CPT

## 2020-10-22 PROCEDURE — 80053 COMPREHEN METABOLIC PANEL: CPT | Performed by: SURGERY

## 2020-10-22 PROCEDURE — 85025 COMPLETE CBC W/AUTO DIFF WBC: CPT | Performed by: SURGERY

## 2020-10-22 PROCEDURE — 83735 ASSAY OF MAGNESIUM: CPT | Performed by: SURGERY

## 2020-10-22 PROCEDURE — 25010000002 METOCLOPRAMIDE PER 10 MG: Performed by: SURGERY

## 2020-10-22 RX ORDER — ONDANSETRON 8 MG/1
8 TABLET, ORALLY DISINTEGRATING ORAL EVERY 8 HOURS PRN
Qty: 30 TABLET | Refills: 5 | OUTPATIENT
Start: 2020-10-22

## 2020-10-22 RX ORDER — HYDROCODONE BITARTRATE AND ACETAMINOPHEN 5; 325 MG/1; MG/1
1 TABLET ORAL EVERY 4 HOURS PRN
Qty: 30 TABLET | Refills: 0 | Status: SHIPPED | OUTPATIENT
Start: 2020-10-22 | End: 2021-07-15 | Stop reason: ALTCHOICE

## 2020-10-22 RX ORDER — HYDROCODONE BITARTRATE AND ACETAMINOPHEN 5; 325 MG/1; MG/1
1 TABLET ORAL EVERY 4 HOURS PRN
Qty: 30 TABLET | Refills: 0 | OUTPATIENT
Start: 2020-10-22

## 2020-10-22 RX ORDER — ONDANSETRON HYDROCHLORIDE 8 MG/1
8 TABLET, FILM COATED ORAL EVERY 8 HOURS PRN
Qty: 30 TABLET | Refills: 3 | Status: SHIPPED | OUTPATIENT
Start: 2020-10-22 | End: 2021-07-15

## 2020-10-22 RX ORDER — OMEPRAZOLE 40 MG/1
40 CAPSULE, DELAYED RELEASE ORAL DAILY
Qty: 30 CAPSULE | Refills: 6 | Status: SHIPPED | OUTPATIENT
Start: 2020-10-22 | End: 2021-07-15

## 2020-10-22 RX ADMIN — HYDROCODONE BITARTRATE AND ACETAMINOPHEN 1 TABLET: 7.5; 325 TABLET ORAL at 08:31

## 2020-10-22 RX ADMIN — CYANOCOBALAMIN 1000 MCG: 1000 INJECTION, SOLUTION INTRAMUSCULAR at 08:25

## 2020-10-22 RX ADMIN — SODIUM CHLORIDE, POTASSIUM CHLORIDE, SODIUM LACTATE AND CALCIUM CHLORIDE 150 ML/HR: 600; 310; 30; 20 INJECTION, SOLUTION INTRAVENOUS at 06:19

## 2020-10-22 RX ADMIN — GABAPENTIN 300 MG: 300 CAPSULE ORAL at 05:22

## 2020-10-22 RX ADMIN — LINAGLIPTIN 5 MG: 5 TABLET, FILM COATED ORAL at 08:25

## 2020-10-22 RX ADMIN — SODIUM CHLORIDE, PRESERVATIVE FREE 3 ML: 5 INJECTION INTRAVENOUS at 08:28

## 2020-10-22 RX ADMIN — ACETAMINOPHEN 1000 MG: 500 TABLET, FILM COATED ORAL at 05:22

## 2020-10-22 RX ADMIN — FAMOTIDINE 20 MG: 20 TABLET, FILM COATED ORAL at 08:25

## 2020-10-22 RX ADMIN — METOCLOPRAMIDE HYDROCHLORIDE 10 MG: 5 INJECTION INTRAMUSCULAR; INTRAVENOUS at 05:22

## 2020-10-22 RX ADMIN — ALBUTEROL SULFATE 2.5 MG: 2.5 SOLUTION RESPIRATORY (INHALATION) at 08:35

## 2020-10-22 RX ADMIN — HYDROCHLOROTHIAZIDE 12.5 MG: 12.5 CAPSULE ORAL at 08:26

## 2020-10-22 RX ADMIN — AMLODIPINE BESYLATE 5 MG: 5 TABLET ORAL at 08:27

## 2020-10-22 RX ADMIN — ENOXAPARIN SODIUM 40 MG: 40 INJECTION SUBCUTANEOUS at 08:24

## 2020-10-22 NOTE — PLAN OF CARE
Goal Outcome Evaluation:  Plan of Care Reviewed With: patient  Progress: no change  Outcome Summary: VSS. Ambulating around nurses station. LR infusing. Monitor vital signs.

## 2020-10-22 NOTE — PLAN OF CARE
Goal Outcome Evaluation:  Plan of Care Reviewed With: patient  Progress: improving  Outcome Summary: pt dc'd per dr Neely. morning meds given, pt declined flu shot, pt verbalized understanding of dc instructions. IV and heart monitor removed. home with . CL

## 2020-10-22 NOTE — DISCHARGE INSTRUCTIONS
GOING HOME AFTER GASTRIC SLEEVE/ GASTRIC BYPASS SURGERY  [facility] Weight Loss: Post-Operative Information/Instructions  Tuyet Neely MD  General Patient Instructions for Discharge   - Call Surgeon's office at [phone] for follow-up appointment.    - Be sure you, the patient, have a follow-up appointment to be seen within seven (7) days after discharge. If not, please call [phone] to schedule an appointment. If you are discharged on a Saturday or Sunday, please call Monday to schedule the appointment.  - Contact the Surgeon at [phone] for any questions or concerns, including temperature greater than or equal to 101F, shortness of breath, leg swelling, redness at incision sites, nausea, vomiting, chills, or problems or questions.    - Follow the Gastric Stage 1 Diet    à Clear liquids, room temperature, sugar-free, caffeine-free, non-carbonated, 70 grams of protein, No Straws.  - You may shower. No tub bath for 2 weeks.  - No lifting, pushing, pulling, or tugging >25 pounds for 3 weeks.  - Ambulate every 3 hours while awake minimum for seven (7) days, increase distance daily.  - For the next several weeks, you are at an increased risk for blood clot formation. Therefore, you should walk regularly. You should not sit for prolonged periods of time, more than 45 minutes, without getting up and walking for 5-10 minutes. This includes any car rides, including the drive home from the hospital. If driving any distance greater than 30 miles over the next two (2) weeks, stop every 30-45 minutes and walk for 5-10 minutes each time.  - Continue using Incentive Spirometer and coughing exercises at least every two (2) hours while awake for one week.  - Continue use of CPAP/BIPAP for diagnosis of sleep apnea as directed.  - No driving or operating machinery allowed while taking narcotic (prescription) pain medication, and until you feel comfortable forcefully applying the brakes if needed. (This usually  takes more than 3 days.)    - Make an appointment with your Primary Care Physician within one week post-op to look at your home medications for possible changes or discontinuity.   Medications  - The nurse will provide a list of medications for you to continue at home   - If you received a Lovenox (Enoxaparin) or Apixiban (Eliquis) prescription at pre-op visit with Surgeon, start taking the medicine the morning after discharge unless directed otherwise.    - If you were prescribed Lovenox (Enoxaparin), review the education/teaching material/video with the nurse.    - Take post op pain meds as prescribed as needed.   - Continue Foltx until finished.   - Resume use of Actigall (Ursodiol) one (1) week after surgery if patient still has gallbladder. You should have been given a prescription at your pre-op visit. Contact the office if you do not have the prescription.   - Resume bariatric vitamin regimen as instructed in pre-op education with bariatric coordinator.    - Zegerid or Prilosec OTC (or generic) by mouth once daily for four (4) weeks unless you are already taking a proton pump inhibitor as home medication. Follow dosing instructions on package.   Nausea/Vomiting:  The following are possible causes for nausea/vomiting:  - Drinking too much or too fast.  - Sinus drainage/post nasal drip for allergy sufferers (you may take Sudafed, Claritin, Tylenol Sinus/Allergy, or other decongestants and nose sprays to help with this discomfort).  - Low blood sugar (sweating, shaky, irritable, weakness, dizzy or tunnel-vision) - treatment is to sip 100% fruit juice - no sugar added until symptoms subside.  - Acid in fruit juice - (may dilute with water or avoid).  - Eating or drinking something that is not on clear liquid (stage 1) diet.  Any nausea/vomiting that prohibits you from keeping fluids down for greater than 24 hours requires a call to the surgeon's office.  Urine:  Use your urine color as a guide to determine if you  are drinking enough fluid. The darker the urine, the more fluids you need to drink. Urine should be clear to light yellow if you are getting enough fluid. If you should experience frequency, burning or pain with urination, blood in urine, contact us or your primary care physician for possible UTI (urinary tract infection), which could require antibiotics (liquid preferred).  Bowel Movements:  You may not have a bowel movement for 2-5 days after going home. You may then experience liquid, runny or loose stools for approximately 3-4 weeks following surgery. This would require you to drink even more fluids to prevent dehydration. Some patients may experience constipation, which can be treated with increased fluids, drinking warm liquids, increased activity and the use of a Fleets Enema, Milk of Magnesia, or suppositories. The first couple of bowel movements could be bloody, tarry black or dark maroon in color. This is OK as long as the stool returns to a normal color in 1-2 days. If however, you have frequent or a large amount of bloody or tarry black stools and/or become light-headed or dizzy, you may be bleeding and require urgent attention. Please call us right away.  Abdominal Incisions:  You will have small incisions. Do not scrub incisions, but allow the warm, soapy water to run over the incisions, rinse well, and pat dry. You may use any brand of anti-bacterial soap. Do not use Peroxide or Neosporin type ointments on sites, unless instructed to do so by a surgeon or nurse. Monitor daily for signs/symptoms of infection, which might include: drainage with a foul odor, pain, redness, swelling or heat at the incision sight; fever, body aches and chills. If you suspect infection or have a fever, give us a call.  Pain:  You will be given a prescription for pain medication to control your pain. If you feel the dose is too strong, you may take half the ordered dose, or you may take Tylenol adult liquid per package  instructions for minor pain. Do not take any medications that contain aspirin or aspirin products.  Do not take medications like: Motrin, Aleve, Ibuprofen, Advil, Naproxen, Celebrex, Daypro, Bextra, Meloxicam or other medications commonly used for arthritis or joint pain.  No steroids or cortisone injections. There may be pain, which should improve every few days. Pain should not suddenly get worse or more intense. Pain that suddenly changes and is constant and severe should be called in to the surgeon's office. Any sudden pain in the lower extremities with associated warmth and redness should be called in to the surgeon's office immediately. Do not rub or massage this area, as it could be a blood clot.  Diet:  Remain on the clear liquid diet (stage 1) per your  which includes 70 grams of protein each day, sugar free, non carbonated and no straws. Day 1 is the day of surgery. If you are tolerating the stage 1 diet, you may then proceed to stage 2 diet, as instructed in the . Do not progress to the stage 2 diet if you are having nausea/vomiting. Refer to the Basic Nutrition and Food Principles guide.  Medications:  The nurse will let you know which medications you will need to continue once you go home. Do not take any medications that are extended or time released if you had the gastric bypass procedure, OK to take if you had the gastric sleeve procedure. Large capsules can be opened and diluted with clear liquids. Check with your physician or pharmacist as to which pills may be crushed and which capsules may be opened and diluted safely. Continue taking Foltx as surgeon orders. If you still have your gall bladder and were prescribed Actigall (Ursodiol), you may resume this medication one week after your surgery. You will remain on Actigall (Ursodiol) for approximately 6 months. The dose is 1 pill, 2 times each day for 6 months.  Activity:  Continue your deep breathing and coughing  exercises with your Incentive Spirometer breathing device at least every 3 hours while awake (10 repetitions each time) for one week. May use CPAP. This will help to prevent respiratory problems such as pneumonia. No lifting, pulling or tugging anything over 25 pounds for 3 weeks after surgery. You may shower but no tub baths, hot tubs or swimming for 2 weeks. Moderate walking is recommended every 3 hours while awake minimum, increase distance daily. Further exercise will be discussed at the first post-op visit. No driving or operating machinery allow until off narcotic pain medication and until you feel comfortable forcefully applying the brakes (usually takes 3 or more days). For the next few weeks you are at an increased risk for blood clot formation. Therefore you should walk regularly and you should not sit for prolonged periods of time, more than 45 minutes without getting up and walking for 5-10 minutes. This includes car rides. Including riding home from the hospital. If riding a distance greater than 30 miles over the next 2 weeks stop every 30-45 minutes and walk 5-10 mintues each time. No tanning bed use for 8 weeks after surgery and in general, not recommended due to the increased risk for skin cancer. Incisions will burn/blister very badly with tanning bed use.  Illness:  Your primary care physician should treat general illness such as ear infections, sinus infections, and viral type illnesses, etc. Medications prescribed should be liquid/elixir form when possible, for the first 30 days.  General:  In general, it is recommended that you weigh yourself no more than once per week. Let the weight come off you and concentrate on more important things. Remember the weight was not gained overnight, nor will it be lost overnight. Gastric Bypass/ Gastric Sleeve weight loss will continue over a period of 12-18 months. Do not  yourself according to how others are doing after surgery, as this will cause  unnecessary discouragement.  THE ABOVE ARE GENERAL GUIDELINES TO ASSIST YOU ONCE HOME, IF YOU ARE IN DOUBT, OR YOU HAVE ANY QUESTIONS, CALL US AT THE NUMBERS LISTED BELOW.  IN THE EVENT OF SUDDEN CHEST PAIN, SHORTNESS OF BREATH, OR ANY LIFE THREATENING CONDITION, CALL 911.  Any time you are evaluated or admitted to another facility, please have someone notify the surgeon's office.  Supplements:  70 grams of protein taken EVERY DAY. Remember to drink at least 64 ounces of fluid a day, sipping slowly early on. Increase this amount during the summertime. Sipping slowly will not stretch your new stomach. Drinking too fast or gulping liquids will cause brief discomfort and early could cause staple line disruption (leak). With eating, tiny bites, then chew, chew, chew, and swallow. Lay your fork/spoon down for 2-3 minutes, and then take your next bite. Your pouch will tell you within 1-2 bites if it is going to tolerate what you are eating.   Protein Vendors:  Refer to protein vendors' handout from consult class. You can always find protein drinks at the bariatric office, grocery stores, [employer], drug [employer], [employer], health food stores, and on the Internet. Find one high in protein (15-30 grams per serving) and low carb (less than 18 grams per serving).  Now is a great time to re-read your . Please review specific instructions given to you at discharge by your physician (surgeon).  HOW/WHEN TO CONTACT US:  It is imperative that you contact us with any of the following:    Ÿ fever greater than 101 degrees  Ÿ shortness of breath  Ÿ leg swelling  Ÿ body aches  Ÿ shaking chills  Ÿ nausea and vomitting  Ÿ pain that has worsened  Ÿ redness at incision sites  Ÿ pus or foul smelling drainage from an incision or wound  Ÿ inability to keep fluids down for more than a day  Ÿ any other condition you feel needs our attention.  [facility] - Bariatric: [phone] call this number anytime 24 hours a day /  7 days a week.  Teach-back Questions to be answered by the patient prior to discharge.   What complications would prompt you to call your doctor when you return home? _________________    What is the purpose of your prescribed medication? ________________  What are some potential side effects of the medications you will be taking at home? _______________                          GOING HOME AFTER GASTRIC SLEEVE/ GASTRIC BYPASS SURGERY  [facility] Weight Loss: Post-Operative Information/Instructions  Johny Fong Jr., MD  General Patient Instructions for Discharge   - Call Surgeon's office at [phone] for follow-up appointment.    - Be sure you, the patient, have a follow-up appointment to be seen within seven (7) days after discharge. If not, please call [phone] to schedule an appointment. If you are discharged on a Saturday or Sunday, please call Monday to schedule the appointment.  - Contact the Surgeon at [phone] for any questions or concerns, including temperature greater than or equal to 101F, shortness of breath, leg swelling, redness at incision sites, nausea, vomiting, chills, or problems or questions.    - Follow the Gastric Stage 1 Diet    à Clear liquids, room temperature, sugar-free, caffeine-free, non-carbonated, 70 grams of protein, No Straws.  - You may shower. No tub bath for 2 weeks.  - No lifting, pushing, pulling, or tugging >25 pounds for 3 weeks.  - Ambulate every 3 hours while awake minimum for seven (7) days, increase distance daily.  - For the next several weeks, you are at an increased risk for blood clot formation. Therefore, you should walk regularly. You should not sit for prolonged periods of time, more than 45 minutes, without getting up and walking for 5-10 minutes. This includes any car rides, including the drive home from the hospital. If driving any distance greater than 30 miles over the next two (2) weeks, stop every 30-45 minutes and walk for 5-10  minutes each time.  - Continue using Incentive Spirometer and coughing exercises at least every two (2) hours while awake for one week.  - Continue use of CPAP/BIPAP for diagnosis of sleep apnea as directed.  - No driving or operating machinery allowed while taking narcotic (prescription) pain medication, and until you feel comfortable forcefully applying the brakes if needed. (This usually takes more than 3 days.)    - Make an appointment with your Primary Care Physician within one week post-op to look at your home medications for possible changes or discontinuity.   Medications  - The nurse will provide a list of medications for you to continue at home   - If you received a Lovenox (Enoxaparin) or Apixiban (Eliquis) prescription at pre-op visit with Surgeon, start taking the medicine the morning after discharge unless directed otherwise.    - If you were prescribed Lovenox (Enoxaparin), review the education/teaching material/video with the nurse.    - Take post op pain meds as prescribed as needed.   - Continue Foltx until finished.   - Resume use of Actigall (Ursodiol) one (1) week after surgery if patient still has gallbladder. You should have been given a prescription at your pre-op visit. Contact the office if you do not have the prescription.   - Resume bariatric vitamin regimen as instructed in pre-op education with bariatric coordinator.    - Zegerid or Prilosec OTC (or generic) by mouth once daily for four (4) weeks unless you are already taking a proton pump inhibitor as home medication. Follow dosing instructions on package.   Nausea/Vomiting:  The following are possible causes for nausea/vomiting:  - Drinking too much or too fast.  - Sinus drainage/post nasal drip for allergy sufferers (you may take Sudafed, Claritin, Tylenol Sinus/Allergy, or other decongestants and nose sprays to help with this discomfort).  - Low blood sugar (sweating, shaky, irritable, weakness, dizzy or tunnel-vision) - treatment  is to sip 100% fruit juice - no sugar added until symptoms subside.  - Acid in fruit juice - (may dilute with water or avoid).  - Eating or drinking something that is not on clear liquid (stage 1) diet.  Any nausea/vomiting that prohibits you from keeping fluids down for greater than 24 hours requires a call to the surgeon's office.  Urine:  Use your urine color as a guide to determine if you are drinking enough fluid. The darker the urine, the more fluids you need to drink. Urine should be clear to light yellow if you are getting enough fluid. If you should experience frequency, burning or pain with urination, blood in urine, contact us or your primary care physician for possible UTI (urinary tract infection), which could require antibiotics (liquid preferred).  Bowel Movements:  You may not have a bowel movement for 2-5 days after going home. You may then experience liquid, runny or loose stools for approximately 3-4 weeks following surgery. This would require you to drink even more fluids to prevent dehydration. Some patients may experience constipation, which can be treated with increased fluids, drinking warm liquids, increased activity and the use of a Fleets Enema, Milk of Magnesia, or suppositories. The first couple of bowel movements could be bloody, tarry black or dark maroon in color. This is OK as long as the stool returns to a normal color in 1-2 days. If however, you have frequent or a large amount of bloody or tarry black stools and/or become light-headed or dizzy, you may be bleeding and require urgent attention. Please call us right away.  Abdominal Incisions:  You will have small incisions. Do not scrub incisions, but allow the warm, soapy water to run over the incisions, rinse well, and pat dry. You may use any brand of anti-bacterial soap. Do not use Peroxide or Neosporin type ointments on sites, unless instructed to do so by a surgeon or nurse. Monitor daily for signs/symptoms of infection,  which might include: drainage with a foul odor, pain, redness, swelling or heat at the incision sight; fever, body aches and chills. If you suspect infection or have a fever, give us a call.  Pain:  You will be given a prescription for pain medication to control your pain. If you feel the dose is too strong, you may take half the ordered dose, or you may take Tylenol adult liquid per package instructions for minor pain. Do not take any medications that contain aspirin or aspirin products.  Do not take medications like: Motrin, Aleve, Ibuprofen, Advil, Naproxen, Celebrex, Daypro, Bextra, Meloxicam or other medications commonly used for arthritis or joint pain.  No steroids or cortisone injections. There may be pain, which should improve every few days. Pain should not suddenly get worse or more intense. Pain that suddenly changes and is constant and severe should be called in to the surgeon's office. Any sudden pain in the lower extremities with associated warmth and redness should be called in to the surgeon's office immediately. Do not rub or massage this area, as it could be a blood clot.  Diet:  Remain on the clear liquid diet (stage 1) per your  which includes 70 grams of protein each day, sugar free, non carbonated and no straws. Day 1 is the day of surgery. If you are tolerating the stage 1 diet, you may then proceed to stage 2 diet, as instructed in the . Do not progress to the stage 2 diet if you are having nausea/vomiting. Refer to the Basic Nutrition and Food Principles guide.  Medications:  The nurse will let you know which medications you will need to continue once you go home. Do not take any medications that are extended or time released if you had the gastric bypass procedure, OK to take if you had the gastric sleeve procedure. Large capsules can be opened and diluted with clear liquids. Check with your physician or pharmacist as to which pills may be crushed and which  capsules may be opened and diluted safely. Continue taking Foltx as surgeon orders. If you still have your gall bladder and were prescribed Actigall (Ursodiol), you may resume this medication one week after your surgery. You will remain on Actigall (Ursodiol) for approximately 6 months. The dose is 1 pill, 2 times each day for 6 months.  Activity:  Continue your deep breathing and coughing exercises with your Incentive Spirometer breathing device at least every 3 hours while awake (10 repetitions each time) for one week. May use CPAP. This will help to prevent respiratory problems such as pneumonia. No lifting, pulling or tugging anything over 25 pounds for 3 weeks after surgery. You may shower but no tub baths, hot tubs or swimming for 2 weeks. Moderate walking is recommended every 3 hours while awake minimum, increase distance daily. Further exercise will be discussed at the first post-op visit. No driving or operating machinery allow until off narcotic pain medication and until you feel comfortable forcefully applying the brakes (usually takes 3 or more days). For the next few weeks you are at an increased risk for blood clot formation. Therefore you should walk regularly and you should not sit for prolonged periods of time, more than 45 minutes without getting up and walking for 5-10 minutes. This includes car rides. Including riding home from the hospital. If riding a distance greater than 30 miles over the next 2 weeks stop every 30-45 minutes and walk 5-10 mintues each time. No tanning bed use for 8 weeks after surgery and in general, not recommended due to the increased risk for skin cancer. Incisions will burn/blister very badly with tanning bed use.  Illness:  Your primary care physician should treat general illness such as ear infections, sinus infections, and viral type illnesses, etc. Medications prescribed should be liquid/elixir form when possible, for the first 30 days.  General:  In general, it is  recommended that you weigh yourself no more than once per week. Let the weight come off you and concentrate on more important things. Remember the weight was not gained overnight, nor will it be lost overnight. Gastric Bypass/ Gastric Sleeve weight loss will continue over a period of 12-18 months. Do not  yourself according to how others are doing after surgery, as this will cause unnecessary discouragement.  THE ABOVE ARE GENERAL GUIDELINES TO ASSIST YOU ONCE HOME, IF YOU ARE IN DOUBT, OR YOU HAVE ANY QUESTIONS, CALL US AT THE NUMBERS LISTED BELOW.  IN THE EVENT OF SUDDEN CHEST PAIN, SHORTNESS OF BREATH, OR ANY LIFE THREATENING CONDITION, CALL 911.  Any time you are evaluated or admitted to another facility, please have someone notify the surgeon's office.  Supplements:  70 grams of protein taken EVERY DAY. Remember to drink at least 64 ounces of fluid a day, sipping slowly early on. Increase this amount during the summertime. Sipping slowly will not stretch your new stomach. Drinking too fast or gulping liquids will cause brief discomfort and early could cause staple line disruption (leak). With eating, tiny bites, then chew, chew, chew, and swallow. Lay your fork/spoon down for 2-3 minutes, and then take your next bite. Your pouch will tell you within 1-2 bites if it is going to tolerate what you are eating.   Protein Vendors:  Refer to protein vendors' handout from consult class. You can always find protein drinks at the bariatric office, grocery stores, [employer], drug [employer], [employer], health food stores, and on the Internet. Find one high in protein (15-30 grams per serving) and low carb (less than 18 grams per serving).  Now is a great time to re-read your . Please review specific instructions given to you at discharge by your physician (surgeon).  HOW/WHEN TO CONTACT US:  It is imperative that you contact us with any of the following:    Ÿ fever greater than 101 degrees  Ÿ shortness of  breath  Ÿ leg swelling  Ÿ body aches  Ÿ shaking chills  Ÿ nausea and vomitting  Ÿ pain that has worsened  Ÿ redness at incision sites  Ÿ pus or foul smelling drainage from an incision or wound  Ÿ inability to keep fluids down for more than a day  Ÿ any other condition you feel needs our attention.  [facility] - Bariatric: [phone] call this number anytime 24 hours a day / 7 days a week.  Teach-back Questions to be answered by the patient prior to discharge.   What complications would prompt you to call your doctor when you return home? _________________    What is the purpose of your prescribed medication? ________________  What are some potential side effects of the medications you will be taking at home? _______________

## 2020-10-22 NOTE — PLAN OF CARE
Goal Outcome Evaluation:  Plan of Care Reviewed With: patient  Progress: no change  Outcome Summary: VSS patient has LR infusing at 150cc/hr. Amubulated in halss and in room. Maintain safety and continue to monitor.

## 2020-10-22 NOTE — PROGRESS NOTES
"Subjective:       Nighat May  is post op day one status post procedure listed. Patient denies shortness of air and lower extremity pain. Feels better than yesterday. No vomiting this am. Ambulating well and using incentive spirometer.       Objective:        /61 (BP Location: Right arm, Patient Position: Lying)   Pulse 68   Temp 98.4 °F (36.9 °C) (Oral)   Resp 18   Ht 160 cm (63\")   Wt (!) 167 kg (367 lb 9.6 oz)   SpO2 92%   BMI 65.12 kg/m²     General:  alert, appears stated age and cooperative   Abdomen: soft, bowel sounds active, appropriate tenderness   Incision:   healing well, no drainage, no erythema, no hernia, no seroma, no swelling, no dehiscence, incision well approximated   Heart: Regular rate   Lungs: Clear to auscultation bilaterally     I reviewed the patient's new clinical results.     Lab Results (last 24 hours)     Procedure Component Value Units Date/Time    Tissue Pathology Exam [[id number]] Collected: 10/21/20 1306    Specimen: Tissue from Stomach Updated: 10/21/20 1441    POC Glucose Once [[id number]]  (Abnormal) Collected: 10/21/20 0910    Specimen: Blood Updated: 10/21/20 0914     Glucose 142 mg/dL              Assessment:      Doing well postoperatively.      Plan:   1. Start diet  2. Continue with ambulation and Incentive spirometry  3. Plan for d/c home later today if tolerating diet  4. Lovenox will be prescribed    "

## 2020-10-22 NOTE — PROGRESS NOTES
Case Management Discharge Note      Final Note: Home         Selected Continued Care - Discharged on 10/22/2020 Admission date: 10/21/2020 - Discharge disposition: Home or Self Care    Destination    No services have been selected for the patient.              Durable Medical Equipment    No services have been selected for the patient.              Dialysis/Infusion    No services have been selected for the patient.              Home Medical Care    No services have been selected for the patient.              Therapy    No services have been selected for the patient.              Community Resources    No services have been selected for the patient.                  Transportation Services  Private: Car    Final Discharge Disposition Code: 01 - home or self-care

## 2020-10-23 ENCOUNTER — READMISSION MANAGEMENT (OUTPATIENT)
Dept: CALL CENTER | Facility: HOSPITAL | Age: 55
End: 2020-10-23

## 2020-10-23 NOTE — OUTREACH NOTE
Prep Survey      Responses   [facility] patient discharged from?  [place]   Is LACE score < 7 ?  No   Eligibility  Readm Mgmt   Discharge diagnosis  Super-super obese,  bariatric surgery   Does the patient have one of the following disease processes/diagnoses(primary or secondary)?  General Surgery   Does the patient have Home health ordered?  No   Is there a DME ordered?  No   Comments regarding appointments  see AVs   Medication alerts for this patient  see AVS   Prep survey completed?  Yes          Michelle Buckley RN

## 2020-10-25 NOTE — DISCHARGE SUMMARY
Date of Discharge:  10/22/2020    Discharge Diagnosis: Morbid obesity    Presenting Problem/History of Present Illness  Active Hospital Problems    Diagnosis  POA   • **Super-super obese (CMS/[facility]) [E66.01]  Unknown   • Super obese [E66.9]  Yes      Resolved Hospital Problems   No resolved problems to display.           Hospital Course  Patient is a 55 y.o. female presented with morbid obesity underwent a laparoscopic gastric sleeve.  The next morning she was able to tolerate liquids and was ambulating and her vital signs and labs were stable.  She is discharged home with follow-up in my office next week.  .      Procedures Performed    Procedure(s):  GASTRIC SLEEVE LAPAROSCOPIC  -------------------       Consults:   Consults     No orders found from 9/22/2020 to 10/22/2020.          Pertinent Test Results:     Condition on Discharge:  good    Vital Signs       Physical Exam:     General Appearance:    Alert, cooperative, in no acute distress   Head:    Normocephalic, without obvious abnormality, atraumatic   Eyes:            Lids and lashes normal, conjunctivae and sclerae normal, no   icterus, no pallor, corneas clear, PERRLA   Ears:    Ears appear intact with no abnormalities noted   Throat:   No oral lesions, no thrush, oral mucosa moist   Neck:   No adenopathy, supple, trachea midline, no thyromegaly, no     carotid bruit, no JVD   Back:     No kyphosis present, no scoliosis present, no skin lesions,       erythema or scars, no tenderness to percussion or                   palpation,   range of motion normal   Lungs:     Clear to auscultation,respirations regular, even and                   unlabored    Heart:    Regular rhythm and normal rate, normal S1 and S2, no            murmur, no gallop, no rub, no click   Breast Exam:    Deferred   Abdomen:     Normal bowel sounds, no masses, no organomegaly, soft        non-tender, non-distended, no guarding, no rebound                 tenderness   Genitalia:     Deferred   Extremities:   Moves all extremities well, no edema, no cyanosis, no              redness   Pulses:   Pulses palpable and equal bilaterally   Skin:   No bleeding, bruising or rash   Lymph nodes:   No palpable adenopathy   Neurologic:   Cranial nerves 2 - 12 grossly intact, sensation intact, DTR        present and equal bilaterally       Discharge Disposition  Home or Self Care    Discharge Medications     Discharge Medications      New Medications      Instructions Start Date   enoxaparin 40 MG/0.4ML solution syringe  Commonly known as: Lovenox   40 mg, Subcutaneous, Every 12 Hours Scheduled      HYDROcodone-acetaminophen 5-325 MG per tablet  Commonly known as: NORCO   1 tablet, Oral, Every 4 Hours PRN      omeprazole 40 MG capsule  Commonly known as: PrilOSEC   40 mg, Oral, Daily         Changes to Medications      Instructions Start Date   ursodiol 250 MG tablet  Commonly known as: ACTIGALL  What changed: additional instructions   250 mg, Oral, 2 times daily         Continue These Medications      Instructions Start Date   acetaminophen 500 MG tablet  Commonly known as: TYLENOL   500-1,000 mg, Oral, Every 6 Hours PRN      amLODIPine 5 MG tablet  Commonly known as: NORVASC   5 mg, Oral, Daily      Breo Ellipta 200-25 MCG/INH inhaler  Generic drug: Fluticasone Furoate-Vilanterol   1 puff, Inhalation, Daily      CHLORHEXIDINE GLUCONATE CLOTH EX   Apply externally, AS DIRECTED       DULoxetine 60 MG capsule  Commonly known as: CYMBALTA   TAKE 1 CAPSULE BY MOUTH EVERY DAY      famotidine 20 MG tablet  Commonly known as: PEPCID   20 mg, Oral, Daily      Flonase 50 MCG/ACT nasal spray  Generic drug: fluticasone   1 spray, Nasal, 2 Times Daily PRN      hydroCHLOROthiazide 12.5 MG capsule  Commonly known as: MICROZIDE   12.5 mg, Oral, Daily      losartan 100 MG tablet  Commonly known as: COZAAR   100 mg, Oral, Daily      metoprolol succinate XL 50 MG 24 hr tablet  Commonly known as: TOPROL-XL   50 mg, Oral,  Every Evening      Nascobal 500 MCG/0.1ML solution  Generic drug: Cyanocobalamin   TO START AFTER SURGERY PER DR NEELY INSTUCTIONS      Ozempic (0.25 or 0.5 MG/DOSE) 2 MG/1.5ML solution pen-injector  Generic drug: Semaglutide(0.25 or 0.5MG/DOS)   0.5 mg, Subcutaneous, Weekly, SUNDAYS      Tradjenta 5 MG tablet tablet  Generic drug: linagliptin   5 mg, Oral, Daily             Discharge Diet:   Diet Instructions     Diet:      Diet Texture / Consistency: Bariatric    Select stage: Stage 1 Clear Liq. Sugar Free (no carbonation, no straw)          Activity at Discharge:   Activity Instructions     Ambulate at Least 4 Times Per Day, Increase Distance Daily      Discharge Activity: Driving Restriction      Driving instructions per bariatric manual and preop education    No Lifting, Pushing, Pulling Tugging More Than 25 lbs. For 3 Weeks            Follow-up Appointments  Future Appointments   Date Time Provider Department Center   10/26/2020 11:30 AM Sania Correa APRN [facility] None   11/20/2020 11:45 AM Tuyet Neely MD [facility] None   6/7/2021  1:00 PM Umer Pandya MD [facility]     Additional Instructions for the Follow-ups that You Need to Schedule     Bathing Instructions   As directed      Patient May Shower on Post-Op Day  No Tub Bath for 2 Weeks.    Order Comments: Patient May Shower on Post-Op Day No Tub Bath for 2 Weeks.          Discharge Follow-up with Specified Provider: Dr Neely; 1 Week   As directed      To: Dr Neely    Follow Up: 1 Week    Follow Up Details: Patient should have follow-up scheduled within 1 week.  If no follow-up scheduled have patient call the office               Test Results Pending at Discharge       Tuyet Neely MD  10/25/20  12:53 EDT    Time:

## 2020-10-26 ENCOUNTER — OFFICE VISIT (OUTPATIENT)
Dept: BARIATRICS/WEIGHT MGMT | Facility: CLINIC | Age: 55
End: 2020-10-26

## 2020-10-26 VITALS
HEART RATE: 83 BPM | TEMPERATURE: 96.8 F | RESPIRATION RATE: 18 BRPM | DIASTOLIC BLOOD PRESSURE: 77 MMHG | BODY MASS INDEX: 51.91 KG/M2 | OXYGEN SATURATION: 96 % | HEIGHT: 63 IN | SYSTOLIC BLOOD PRESSURE: 137 MMHG | WEIGHT: 293 LBS

## 2020-10-26 DIAGNOSIS — Z51.89 VISIT FOR WOUND CHECK: ICD-10-CM

## 2020-10-26 PROCEDURE — 99024 POSTOP FOLLOW-UP VISIT: CPT | Performed by: NURSE PRACTITIONER

## 2020-10-26 NOTE — PAYOR COMM NOTE
"Nighat May (55 y.o. Female)     ATTN: DISCHARGE SUMMARY FOR REVIEW, # [id number]      [facility] DEPT: KEO RASHEED RN/CCP; [phone],  [phone]        Date of Birth Social Security Number Address Home Phone MRN    1965  [id number] [address] [phone] [id number]    Orth[employer] Marital Status          Other        Admission Date Admission Type Admitting Provider Attending Provider Department, Room/Bed    10/21/20 Elective Tuyet Neely MD  [address], [address]    Discharge Date Discharge Disposition Discharge Destination        10/22/2020 Home or Self Care              Attending Provider: (none)   Allergies: Lisinopril    Isolation: None   Infection: None   Code Status: Prior    Ht: 160 cm (63\")   Wt: 167 kg (367 lb 9.6 oz)    Admission Cmt: None   Principal Problem: Super-super obese (CMS/HCC) [E66.01] More...                 Active Insurance as of 10/21/2020     Primary Coverage     Payor Plan Insurance Group Employer/Plan Group    [employer] A[employer] [id number]     Payor Plan Address Payor Plan Phone Number Payor Plan Fax Number Effective Dates    [address] [phone]  1/1/2018 - None Entered    [facility] [address]       Subscriber Name Subscriber Birth Date Member ID       CHAS MAY [birth year]/10/1956 [id number]                 Emergency Contacts      (Rel.) Home Phone Work Phone Mobile Phone    ANGIE MAY (Daughter) -- -- [phone]    CHAS MAY (Spouse) -- -- [phone]               Discharge Summary      Tuyet Neely MD at 10/22/20 0938            Date of Discharge:  10/22/2020    Discharge Diagnosis: Morbid obesity    Presenting Problem/History of Present Illness  Active Hospital Problems    Diagnosis  POA   • **Super-super obese (CMS/HCC) [E66.01]  Unknown   • Super obese [E66.9]  Yes      Resolved Hospital Problems   No resolved problems to display.           Hospital Course  Patient is a 55 y.o. " female presented with morbid obesity underwent a laparoscopic gastric sleeve.  The next morning she was able to tolerate liquids and was ambulating and her vital signs and labs were stable.  She is discharged home with follow-up in my office next week.  .      Procedures Performed    Procedure(s):  GASTRIC SLEEVE LAPAROSCOPIC  -------------------       Consults:   Consults     No orders found from 9/22/2020 to 10/22/2020.          Pertinent Test Results:     Condition on Discharge:  good    Vital Signs       Physical Exam:     General Appearance:    Alert, cooperative, in no acute distress   Head:    Normocephalic, without obvious abnormality, atraumatic   Eyes:            Lids and lashes normal, conjunctivae and sclerae normal, no   icterus, no pallor, corneas clear, PERRLA   Ears:    Ears appear intact with no abnormalities noted   Throat:   No oral lesions, no thrush, oral mucosa moist   Neck:   No adenopathy, supple, trachea midline, no thyromegaly, no     carotid bruit, no JVD   Back:     No kyphosis present, no scoliosis present, no skin lesions,       erythema or scars, no tenderness to percussion or                   palpation,   range of motion normal   Lungs:     Clear to auscultation,respirations regular, even and                   unlabored    Heart:    Regular rhythm and normal rate, normal S1 and S2, no            murmur, no gallop, no rub, no click   Breast Exam:    Deferred   Abdomen:     Normal bowel sounds, no masses, no organomegaly, soft        non-tender, non-distended, no guarding, no rebound                 tenderness   Genitalia:    Deferred   Extremities:   Moves all extremities well, no edema, no cyanosis, no              redness   Pulses:   Pulses palpable and equal bilaterally   Skin:   No bleeding, bruising or rash   Lymph nodes:   No palpable adenopathy   Neurologic:   Cranial nerves 2 - 12 grossly intact, sensation intact, DTR        present and equal bilaterally       Discharge  Disposition  Home or Self Care    Discharge Medications     Discharge Medications      New Medications      Instructions Start Date   enoxaparin 40 MG/0.4ML solution syringe  Commonly known as: Lovenox   40 mg, Subcutaneous, Every 12 Hours Scheduled      HYDROcodone-acetaminophen 5-325 MG per tablet  Commonly known as: NORCO   1 tablet, Oral, Every 4 Hours PRN      omeprazole 40 MG capsule  Commonly known as: PrilOSEC   40 mg, Oral, Daily         Changes to Medications      Instructions Start Date   ursodiol 250 MG tablet  Commonly known as: ACTIGALL  What changed: additional instructions   250 mg, Oral, 2 times daily         Continue These Medications      Instructions Start Date   acetaminophen 500 MG tablet  Commonly known as: TYLENOL   500-1,000 mg, Oral, Every 6 Hours PRN      amLODIPine 5 MG tablet  Commonly known as: NORVASC   5 mg, Oral, Daily      Breo Ellipta 200-25 MCG/INH inhaler  Generic drug: Fluticasone Furoate-Vilanterol   1 puff, Inhalation, Daily      CHLORHEXIDINE GLUCONATE CLOTH EX   Apply externally, AS DIRECTED       DULoxetine 60 MG capsule  Commonly known as: CYMBALTA   TAKE 1 CAPSULE BY MOUTH EVERY DAY      famotidine 20 MG tablet  Commonly known as: PEPCID   20 mg, Oral, Daily      Flonase 50 MCG/ACT nasal spray  Generic drug: fluticasone   1 spray, Nasal, 2 Times Daily PRN      hydroCHLOROthiazide 12.5 MG capsule  Commonly known as: MICROZIDE   12.5 mg, Oral, Daily      losartan 100 MG tablet  Commonly known as: COZAAR   100 mg, Oral, Daily      metoprolol succinate XL 50 MG 24 hr tablet  Commonly known as: TOPROL-XL   50 mg, Oral, Every Evening      Nascobal 500 MCG/0.1ML solution  Generic drug: Cyanocobalamin   TO START AFTER SURGERY PER DR GOMEZ INSTUCTIONS      Ozempic (0.25 or 0.5 MG/DOSE) 2 MG/1.5ML solution pen-injector  Generic drug: Semaglutide(0.25 or 0.5MG/DOS)   0.5 mg, Subcutaneous, Weekly, SUNDAYS      Tradjenta 5 MG tablet tablet  Generic drug: linagliptin   5 mg, Oral,  Daily             Discharge Diet:   Diet Instructions     Diet:      Diet Texture / Consistency: Bariatric    Select stage: Stage 1 Clear Liq. Sugar Free (no carbonation, no straw)          Activity at Discharge:   Activity Instructions     Ambulate at Least 4 Times Per Day, Increase Distance Daily      Discharge Activity: Driving Restriction      Driving instructions per bariatric manual and preop education    No Lifting, Pushing, Pulling Tugging More Than 25 lbs. For 3 Weeks            Follow-up Appointments  Future Appointments   Date Time Provider Department Center   10/26/2020 11:30 AM Sania Correa APRN [facility] None   11/20/2020 11:45 AM Tuyet Neely MD [facility] None   6/7/2021  1:00 PM Umer Pandya MD [facility]     Additional Instructions for the Follow-ups that You Need to Schedule     Bathing Instructions   As directed      Patient May Shower on Post-Op Day  No Tub Bath for 2 Weeks.    Order Comments: Patient May Shower on Post-Op Day No Tub Bath for 2 Weeks.          Discharge Follow-up with Specified Provider: Dr Neely; 1 Week   As directed      To: Dr Neely    Follow Up: 1 Week    Follow Up Details: Patient should have follow-up scheduled within 1 week.  If no follow-up scheduled have patient call the office               Test Results Pending at Discharge       Tuyet Neely MD  10/25/20  12:53 EDT    Time:           Electronically signed by Tuyet Neely MD at 10/25/20 1592

## 2020-10-26 NOTE — PROGRESS NOTES
.  [facility]  [address] [place] [address]  [address] [place] [address]  Dept: [phone]  10/29/2020      Nighat May.  [id number]  [id number]  1965  female      Chief Complaint   Patient presents with   • Follow-up     1 wk [facility] Post-Op Bariatric Surgery:     HPI:   Weight loss in the last week:6 pounds     Wt Readings from Last 10 Encounters:   10/26/20 (!) 164 kg (361 lb 12.8 oz)   10/21/20 (!) 167 kg (367 lb 9.6 oz)   10/08/20 (!) 171 kg (377 lb)   08/31/20 (!) 173 kg (380 lb 9.6 oz)   08/28/20 (!) 174 kg (383 lb 9.6 oz)   07/28/20 (!) 173 kg (381 lb 9.6 oz)   06/15/20 (!) 174 kg (383 lb 3.2 oz)   06/05/20 (!) 170 kg (375 lb)   06/05/20 (!) 175 kg (385 lb)   05/21/20 (!) 174 kg (384 lb)       Review of Systems   Constitutional: Positive for activity change and fatigue.   Respiratory: Negative.    Cardiovascular: Negative.    Gastrointestinal: Negative.    Musculoskeletal: Positive for back pain and myalgias.     Drinking broth, protein shakes and water    Patient Active Problem List   Diagnosis   • Carpal tunnel syndrome, bilateral upper limbs   • Chest pain   • Depression   • Diabetes mellitus (CMS/HCC)   • Elevated erythrocyte sedimentation rate   • Encounter for screening for osteoporosis   • Fatigue   • Fibromyalgia   • HTN (hypertension)   • Seronegative arthritis   • Inflammatory arthritis   • Joint pain   • Arthralgia of left wrist   • Body mass index 50.0-59.9, adult (CMS/HCC)   • Obesity   • Other long term (current) drug therapy   • Other specified disorders of bone density and structure, other site   • Rotator cuff tendonitis   • Urinary tract infection   • Vitamin D deficiency   • Morbid obesity (CMS/HCC)   • Pre-operative examination   • Body mass index (BMI) of 60.0-69.9 in adult (CMS/HCC)   • Super obese   • Super-super obese (CMS/HCC)       The following portions of the patient's  history were reviewed and updated as appropriate: allergies, current medications, past family history, past medical history, past social history, past surgical history and problem list.    Vitals:    10/26/20 1136   BP: 137/77   Pulse: 83   Resp: 18   Temp: 96.8 °F (36 °C)   SpO2: 96%       Physical Exam  Constitutional:       Appearance: Normal appearance. She is obese.   Cardiovascular:      Rate and Rhythm: Normal rate and regular rhythm.      Pulses: Normal pulses.      Heart sounds: Normal heart sounds.   Pulmonary:      Effort: Pulmonary effort is normal.      Breath sounds: Normal breath sounds.   Abdominal:      General: Abdomen is flat. Bowel sounds are normal.      Palpations: Abdomen is soft.      Comments: Incision clean dry and intact    Skin:     General: Skin is warm and dry.   Neurological:      General: No focal deficit present.      Mental Status: She is alert and oriented to person, place, and time.   Psychiatric:         Mood and Affect: Mood normal.         Behavior: Behavior normal.         Thought Content: Thought content normal.         Judgment: Judgment normal.         Assessment:   Post-op, the patient is doing well. Pt is tolerating PO fluids well. Pt is walking. No nausea/vomiting/ abd pain. Diet progression reviewed with pt today and handout given to pt. Signs and symptoms of a leak reviewed with pt. Plan to follow up at 1 month apt.     Plan:   Reviewed with patient the importance of following the manual for diet progression. Increase activity as tolerated. Continue increasing daily intake of protein and water.   Return to work: the patient is to return to 3 weeks from their surgery date with no restrictions unless they develop medical problems in which we will see them back in the office. They received a note in our office today with their return to work date.  Activity restrictions: no lifting, pushing or pulling over 25lbs for 3 weeks.   Recommended patient be sure to get at least  70 grams of protein per day. Discussed with the patient the recommended amount of water per day to intake. Reviewed vitamin requirements. Be sure to do routine exercise and increase activity as tolerated. No asa, nsaids or steroids for 8 weeks. Patient may use miralax as needed if necessary.     Instructions / Recommendations: dietary counseling recommended, recommended a daily protein intake of  grams, vitamin supplement(s) recommended, recommended exercising at least 150 minutes per week, behavior modifications recommended and instructed to call the office for concerns, questions, or problems.     The patient was instructed to follow up at one month follow up appt.     The patient was counseled regarding post op bariatric manual    Total time spent with patient 30 minutes of which 20 minutes were spent on education    EMILEE Montoya  [facility]ariat[facility] and general Surgery

## 2020-10-27 ENCOUNTER — READMISSION MANAGEMENT (OUTPATIENT)
Dept: CALL CENTER | Facility: HOSPITAL | Age: 55
End: 2020-10-27

## 2020-10-27 NOTE — OUTREACH NOTE
General Surgery Week 1 Survey      Responses   [facility] patient discharged from?  [place]   Does the patient have one of the following disease processes/diagnoses(primary or secondary)?  General Surgery   Week 1 attempt successful?  Yes   Call start time  1503   Call end time  1510   Discharge diagnosis  Super-super obese,  bariatric surgery   Is patient permission given to speak with other caregiver?  Yes   List who call center can speak with   or dtr   Meds reviewed with patient/caregiver?  Yes   Is the patient having any side effects they believe may be caused by any medication additions or changes?  No   Does the patient have all medications related to this admission filled (includes all antibiotics, pain medications, etc.)  Yes   Is the patient taking all medications as directed (includes completed medication regime)?  Yes   Does the patient have a follow up appointment scheduled with their surgeon?  Yes   Has the patient kept scheduled appointments due by today?  Yes   Psychosocial issues?  No   Comments  Pt reports that she is getting almost the 70grams of protein daily and she is getting 32 oz of water and 32 oz of other clear liquids daily to total 64oz fluids daily. She reports her incision sites are healing w/o signs of infection. Pt has no issues urinating or having BM. She denies pain. She is walking frequently throughout day. She has noticed a trend in her glucose levels being lower than before, today 109, previously in 180's, she reports.    Did the patient receive a copy of their discharge instructions?  Yes   Nursing interventions  Reviewed instructions with patient   What is the patient's perception of their health status since discharge?  Improving   Nursing interventions  Nurse provided patient education   Is the patient /caregiver able to teach back basic post-op care?  Continue use of incentive spirometry at least 1 week post discharge   Is the patient/caregiver able to teach  back signs and symptoms of incisional infection?  Fever   Is the patient/caregiver able to teach back steps to recovery at home?  Set small, achievable goals for return to baseline health, Eat a well-balance diet   If the patient is a current smoker, are they able to teach back resources for cessation?  -- [pt smoking daily]   Is the patient/caregiver able to teach back the hierarchy of who to call/visit for symptoms/problems? PCP, Specialist, Home health nurse, Urgent Care, ED, 911  Yes   Week 1 call completed?  Yes          Florecita Fields RN

## 2020-11-05 ENCOUNTER — READMISSION MANAGEMENT (OUTPATIENT)
Dept: CALL CENTER | Facility: HOSPITAL | Age: 55
End: 2020-11-05

## 2020-11-05 NOTE — OUTREACH NOTE
General Surgery Week 2 Survey      Responses   [facility] patient discharged from?  [place]   Does the patient have one of the following disease processes/diagnoses(primary or secondary)?  General Surgery   Week 2 attempt successful?  Yes   Call start time  1258   Call end time  1300   Discharge diagnosis  Super-super obese,  bariatric surgery   Meds reviewed with patient/caregiver?  Yes   Is the patient having any side effects they believe may be caused by any medication additions or changes?  No   Does the patient have all medications related to this admission filled (includes all antibiotics, pain medications, etc.)  Yes   Is the patient taking all medications as directed (includes completed medication regime)?  Yes   Does the patient have a follow up appointment scheduled with their surgeon?  Yes   Has the patient kept scheduled appointments due by today?  Yes   Has home health visited the patient within 72 hours of discharge?  N/A   Psychosocial issues?  No   Did the patient receive a copy of their discharge instructions?  Yes   Nursing interventions  Reviewed instructions with patient   What is the patient's perception of their health status since discharge?  Improving   Nursing interventions  Nurse provided patient education   Is the patient /caregiver able to teach back basic post-op care?  Continue use of incentive spirometry at least 1 week post discharge, No tub bath, swimming, or hot tub until instructed by MD, Keep incision areas clean,dry and protected, Lifting as instructed by MD in discharge instructions, Take showers only when approved by MD-sponge bathe until then   Is the patient/caregiver able to teach back signs and symptoms of incisional infection?  Pus or odor from incision, Fever, Increased drainage or bleeding, Increased redness, swelling or pain at the incisonal site, Incisional warmth   Is the patient/caregiver able to teach back steps to recovery at home?  Set small, achievable  goals for return to baseline health, Eat a well-balance diet, Rest and rebuild strength, gradually increase activity, Practice good oral hygiene   Is the patient/caregiver able to teach back the hierarchy of who to call/visit for symptoms/problems? PCP, Specialist, Home health nurse, Urgent Care, ED, 911  Yes   Week 2 call completed?  Yes          Quinton Neff RN

## 2020-11-12 ENCOUNTER — READMISSION MANAGEMENT (OUTPATIENT)
Dept: CALL CENTER | Facility: HOSPITAL | Age: 55
End: 2020-11-12

## 2020-11-12 NOTE — OUTREACH NOTE
General Surgery Week 3 Survey      Responses   [facility] patient discharged from?  [place]   Does the patient have one of the following disease processes/diagnoses(primary or secondary)?  General Surgery   Week 3 attempt successful?  No   Unsuccessful attempts  Attempt 1          Stephanie Head RN

## 2020-11-17 ENCOUNTER — READMISSION MANAGEMENT (OUTPATIENT)
Dept: CALL CENTER | Facility: HOSPITAL | Age: 55
End: 2020-11-17

## 2020-11-17 NOTE — OUTREACH NOTE
General Surgery Week 3 Survey      Responses   [facility] patient discharged from?  [place]   Does the patient have one of the following disease processes/diagnoses(primary or secondary)?  General Surgery   Week 3 attempt successful?  Yes   Call start time  1418   Call end time  1421   Discharge diagnosis  Super-super obese,  bariatric surgery   Meds reviewed with patient/caregiver?  Yes   Is the patient taking all medications as directed (includes completed medication regime)?  N/A   Medication comments  surgery appt 11/20   Has the patient kept scheduled appointments due by today?  Yes   What is the patient's perception of their health status since discharge?  Improving   If the patient is a current smoker, are they able to teach back resources for cessation?  Not a smoker   Week 3 call completed?  Yes   Revoked  No further contact(revokes)-requires comment   Graduated/Revoked comments  Goals met   Wrap up additional comments  Glucose 117, excellent! Getting her 70 gm of protein and drinking 64 oz in water.  Healing well.  Walking daily.            Cecelia Humphrey RN

## 2020-11-20 ENCOUNTER — OFFICE VISIT (OUTPATIENT)
Dept: BARIATRICS/WEIGHT MGMT | Facility: CLINIC | Age: 55
End: 2020-11-20

## 2020-11-20 VITALS
DIASTOLIC BLOOD PRESSURE: 79 MMHG | BODY MASS INDEX: 51.91 KG/M2 | HEIGHT: 63 IN | HEART RATE: 63 BPM | SYSTOLIC BLOOD PRESSURE: 135 MMHG | RESPIRATION RATE: 16 BRPM | OXYGEN SATURATION: 98 % | WEIGHT: 293 LBS

## 2020-11-20 PROCEDURE — 99024 POSTOP FOLLOW-UP VISIT: CPT | Performed by: NURSE PRACTITIONER

## 2020-11-20 NOTE — PROGRESS NOTES
[facility] BAR SURG [facility]NAG[facility]  [address] [place] [address]  [address] [place] [address]  Dept: [phone]  11/20/2020      Nighat May.  [id number]  [id number]  1965  female      1 month gastric sleeve     BH Post-Op Bariatric Surgery:   Nighat May is status post procedure listed above  HPI:     Wt Readings from Last 10 Encounters:   11/20/20 (!) 159 kg (351 lb 3.2 oz)   10/26/20 (!) 164 kg (361 lb 12.8 oz)   10/21/20 (!) 167 kg (367 lb 9.6 oz)   10/08/20 (!) 171 kg (377 lb)   08/31/20 (!) 173 kg (380 lb 9.6 oz)   08/28/20 (!) 174 kg (383 lb 9.6 oz)   07/28/20 (!) 173 kg (381 lb 9.6 oz)   06/15/20 (!) 174 kg (383 lb 3.2 oz)   06/05/20 (!) 170 kg (375 lb)   06/05/20 (!) 175 kg (385 lb)        Today's weight is (!) 159 kg (351 lb 3.2 oz) pounds, today's BMI is Body mass index is 62.21 kg/m².,@ has a  loss of 10 pounds since the last visit and@ weight loss since surgery is 16 pounds. The patient reports a decreased portion size and loss of appetite.      Nighat May denies gerd, nausea/ vomiting     Diet and Exercise: Diet history reviewed and discussed with the patient. Weight loss/gains to date discussed with the patient. The patient states they are eating  70+grams of protein per day. She reports eating 3 meals per day, a typical portion size of 1/2 cup, eating 1 snacks per day, drinking  or more 8-oz. glasses of water per day, no carbonated beverage consumption and exercising regularly.     Protein shake for breakfast,  Eggs, chicken, cottage cheese  4oz portion   Blood sugars running 109-160 highest   Snack string cheese   Drinks: water, tea, decaf coffee , no carbonation   Exercise: walking , joined gym, treadmill, stationary bike , hand bike             Supplements: multivitamin.     Review of Systems   Constitutional: Positive for activity change and appetite change.   Respiratory: Negative.    Cardiovascular: Negative.     Gastrointestinal: Negative.    Musculoskeletal: Positive for back pain and myalgias.       Patient Active Problem List   Diagnosis   • Carpal tunnel syndrome, bilateral upper limbs   • Chest pain   • Depression   • Diabetes mellitus (CMS/HCC)   • Elevated erythrocyte sedimentation rate   • Encounter for screening for osteoporosis   • Fatigue   • Fibromyalgia   • HTN (hypertension)   • Seronegative arthritis   • Inflammatory arthritis   • Joint pain   • Arthralgia of left wrist   • Body mass index 50.0-59.9, adult (CMS/HCC)   • Obesity   • Other long term (current) drug therapy   • Other specified disorders of bone density and structure, other site   • Rotator cuff tendonitis   • Urinary tract infection   • Vitamin D deficiency   • Morbid obesity (CMS/HCC)   • Pre-operative examination   • Body mass index (BMI) of 60.0-69.9 in adult (CMS/[facility])   • Super obese   • Super-super obese (CMS/[facility])       Past Medical History:   Diagnosis Date   • Asthma     exercise induced   • Depression    • DM type 2 (diabetes mellitus, type 2) (CMS/[facility])    • Enlarged thyroid     MD WATCHES   • Fibromyalgia, primary    • GERD (gastroesophageal reflux disease)    • History of cellulitis     LEFT LEG. AFTER A FALL.    • Hypertension    • Obesities, morbid (CMS/HCC)    • Rheumatoid arthritis (CMS/[facility])    • Seasonal allergies    • Shortness of breath    • Skin cancer     REMOVED FROM NECK   • Sleep apnea     BIPAP   • Tinnitus        The following portions of the patient's history were reviewed and updated as appropriate: allergies, current medications, past family history, past medical history, past social history, past surgical history and problem list.    Vitals:    11/20/20 1153   BP: 135/79   Pulse: 63   Resp: 16   SpO2: 98%       Physical Exam  Constitutional:       Appearance: Normal appearance. She is obese.   Cardiovascular:      Rate and Rhythm: Normal rate and regular rhythm.   Pulmonary:      Effort: Pulmonary effort is normal.       Breath sounds: Normal breath sounds.   Abdominal:      General: Abdomen is flat. Bowel sounds are normal.      Palpations: Abdomen is soft.   Neurological:      General: No focal deficit present.      Mental Status: She is alert. Mental status is at baseline. She is disoriented.   Psychiatric:         Mood and Affect: Mood normal.         Behavior: Behavior normal.         Thought Content: Thought content normal.         Judgment: Judgment normal.           Assessment:   Post op the patient is doing well. Is getting her protein in. Is starting to exercise now. Will check 1 month labs today. Follow up in 2 months.     Plan:     Encouraged patient to be sure to get plenty of lean protein per day through small frequent meals all with a protein source.   Activity restrictions: none.   Recommended patient be sure to get at least 70 grams of protein per day by eating small, frequent meals all with high lean protein choices. Be sure to limit/cut back on daily carbohydrate intake. Discussed with the patient the recommended amount of water per day to intake- half of body weight in ounces. Reviewed vitamin requirements. Be sure to do routine exercise, 150 minutes per week minimum, including both cardio and strength training.     Instructions / Recommendations: dietary counseling recommended, recommended a daily protein intake of  grams, vitamin supplement(s) recommended, recommended exercising at least 150 minutes per week, behavior modifications recommended and instructed to call the office for concerns, questions, or problems.     The patient was instructed to follow up in 2 months .     The patient was counseled regarding. Total time spent face to face was 25 minutes and 20 minutes was spent counseling.     DMII, 2 DM meds, BP 4 meds, 0 cholesterol meds       EMILEE Montoya  [facility]

## 2021-01-05 RX ORDER — POTASSIUM CHLORIDE 750 MG/1
10 TABLET, FILM COATED, EXTENDED RELEASE ORAL DAILY
Qty: 60 TABLET | Refills: 1 | Status: CANCELLED | OUTPATIENT
Start: 2021-01-05

## 2021-01-06 RX ORDER — POTASSIUM CHLORIDE 20 MEQ/1
20 TABLET, EXTENDED RELEASE ORAL DAILY
Qty: 30 TABLET | Refills: 1 | Status: SHIPPED | OUTPATIENT
Start: 2021-01-06 | End: 2021-10-20

## 2021-01-20 ENCOUNTER — OFFICE VISIT (OUTPATIENT)
Dept: BARIATRICS/WEIGHT MGMT | Facility: CLINIC | Age: 56
End: 2021-01-20

## 2021-01-20 VITALS
HEART RATE: 80 BPM | OXYGEN SATURATION: 96 % | WEIGHT: 293 LBS | RESPIRATION RATE: 16 BRPM | BODY MASS INDEX: 51.91 KG/M2 | DIASTOLIC BLOOD PRESSURE: 78 MMHG | HEIGHT: 63 IN | TEMPERATURE: 97.8 F | SYSTOLIC BLOOD PRESSURE: 123 MMHG

## 2021-01-20 PROCEDURE — 99214 OFFICE O/P EST MOD 30 MIN: CPT | Performed by: NURSE PRACTITIONER

## 2021-01-20 NOTE — PROGRESS NOTES
[facility] BAR SURG [facility] WEIGHT [facility]NAGEMENT  [address] [place] [address]  [address] [place] [address]  Dept: [phone]  1/20/2021      Nighat May.  [id number]  [id number]  1965  female      Chief Complaint   Patient presents with   • Follow-up     3mo f/u GS       BH Post-Op Bariatric Surgery:   Nighat May is status post procedure listed above  HPI:     Wt Readings from Last 10 Encounters:   01/20/21 (!) 150 kg (330 lb)   11/20/20 (!) 159 kg (351 lb 3.2 oz)   10/26/20 (!) 164 kg (361 lb 12.8 oz)   10/21/20 (!) 167 kg (367 lb 9.6 oz)   10/08/20 (!) 171 kg (377 lb)   08/31/20 (!) 173 kg (380 lb 9.6 oz)   08/28/20 (!) 174 kg (383 lb 9.6 oz)   07/28/20 (!) 173 kg (381 lb 9.6 oz)   06/15/20 (!) 174 kg (383 lb 3.2 oz)   06/05/20 (!) 170 kg (375 lb)        Today's weight is (!) 150 kg (330 lb) pounds, today's BMI is Body mass index is 58.46 kg/m².,@ has a  loss of 21 pounds since the last visit and@ weight loss since surgery is 47 pounds. The patient reports a decreased portion size and loss of appetite.      Nighat May denies nausea/vomiting/ GERD      Diet and Exercise: Diet history reviewed and discussed with the patient. Weight loss/gains to date discussed with the patient. The patient states they are eating 70-90 grams of protein per day. She reports eating 3 meals per day, a typical portion size of 4-5 oz, eating 1 snacks per day, drinking 6 or more 8-oz. glasses of water per day, no carbonated beverage consumption and exercising regularly.     Breakfast: eggs or protein shake ( at least 1 a day)   Lunch: chicken or tuna or left overs from night before, lasagna   Dinner: grilled chicken salad   Drinks: protein shakes, water, decaf tea and coffee, milk PRN, no carbonation   Snacks: protein shakes PRN  Exercise: going to the gym every other day stepper machine 1 hour at a time , has trouble walking due to knee pain , able to walk around the  store, doesn't have to use the scooter anymore    Has trouble eating too fast when eating out     Blood sugars running 120's  Takes famotidine daily- no omeprazole anymore       Supplements: protein shakes   Over the counter multi vitamin    Smoking cigarettes again 2- cigarettes every other day     Review of Systems   Constitutional: Positive for activity change and appetite change.   Respiratory: Negative.    Cardiovascular: Negative.    Gastrointestinal: Negative.    Musculoskeletal: Positive for back pain.       Patient Active Problem List   Diagnosis   • Carpal tunnel syndrome, bilateral upper limbs   • Chest pain   • Depression   • Diabetes mellitus (CMS/HCC)   • Elevated erythrocyte sedimentation rate   • Encounter for screening for osteoporosis   • Fatigue   • Fibromyalgia   • HTN (hypertension)   • Seronegative arthritis   • Inflammatory arthritis   • Joint pain   • Arthralgia of left wrist   • Body mass index 50.0-59.9, adult (CMS/HCC)   • Obesity   • Other long term (current) drug therapy   • Other specified disorders of bone density and structure, other site   • Rotator cuff tendonitis   • Urinary tract infection   • Vitamin D deficiency   • Morbid obesity (CMS/HCC)   • Pre-operative examination   • Body mass index (BMI) of 60.0-69.9 in adult (CMS/HCC)   • Super obese   • Super-super obese (CMS/HCC)       Past Medical History:   Diagnosis Date   • Asthma     exercise induced   • Depression    • DM type 2 (diabetes mellitus, type 2) (CMS/HCC)    • Enlarged thyroid     MD WATCHES   • Fibromyalgia, primary    • GERD (gastroesophageal reflux disease)    • History of cellulitis     LEFT LEG. AFTER A FALL.    • Hypertension    • Obesities, morbid (CMS/HCC)    • Rheumatoid arthritis (CMS/HCC)    • Seasonal allergies    • Shortness of breath    • Skin cancer     REMOVED FROM NECK   • Sleep apnea     BIPAP   • Tinnitus        The following portions of the patient's history were reviewed and updated as  appropriate: allergies, current medications, past family history, past medical history, past social history, past surgical history and problem list.    Vitals:    01/20/21 1135   BP: 123/78   Pulse: 80   Resp: 16   Temp: 97.8 °F (36.6 °C)   SpO2: 96%       Physical Exam  Constitutional:       Appearance: Normal appearance. She is obese.   Cardiovascular:      Rate and Rhythm: Normal rate and regular rhythm.   Pulmonary:      Effort: Pulmonary effort is normal.      Breath sounds: Normal breath sounds.   Abdominal:      General: Abdomen is flat. Bowel sounds are normal.      Palpations: Abdomen is soft.   Skin:     General: Skin is warm and dry.   Neurological:      General: No focal deficit present.      Mental Status: She is alert and oriented to person, place, and time.   Psychiatric:         Mood and Affect: Mood normal.         Behavior: Behavior normal.         Thought Content: Thought content normal.         Judgment: Judgment normal.           Assessment:   Post-op, the patient is doing well with weight loss. She is exercising some and getting her protein in. Encourage strength training. Also encourage eating slowly and chewing her food well. Plan to follow up in 3 months.      Pt has resumed smoking. I spent a long time with the pt educating her on the importance of smoking cessation with hx of gastric sleeve and increased risk of marginal ulcer. Pt unwilling to quit at this time.     Plan:     Encouraged patient to be sure to get plenty of lean protein per day through small frequent meals all with a protein source.   Activity restrictions: none.   Recommended patient be sure to get at least 70 grams of protein per day by eating small, frequent meals all with high lean protein choices. Be sure to limit/cut back on daily carbohydrate intake. Discussed with the patient the recommended amount of water per day to intake- half of body weight in ounces. Reviewed vitamin requirements. Be sure to do routine  exercise, 150 minutes per week minimum, including both cardio and strength training.     Instructions / Recommendations: dietary counseling recommended, recommended a daily protein intake of  grams, vitamin supplement(s) recommended, recommended exercising at least 150 minutes per week, behavior modifications recommended and instructed to call the office for concerns, questions, or problems.     The patient was instructed to follow up in 3 months .     The patient was counseled regarding. Total time spent face to face was 25 minutes and 20 minutes was spent counseling.  EMILEE Montoya  [facility] Bariatrics and General Surgery

## 2021-04-01 RX ORDER — URSODIOL 250 MG/1
TABLET, FILM COATED ORAL
Qty: 180 TABLET | Refills: 1 | OUTPATIENT
Start: 2021-04-01

## 2021-04-21 ENCOUNTER — OFFICE VISIT (OUTPATIENT)
Dept: BARIATRICS/WEIGHT MGMT | Facility: CLINIC | Age: 56
End: 2021-04-21

## 2021-04-21 VITALS
TEMPERATURE: 97.8 F | SYSTOLIC BLOOD PRESSURE: 95 MMHG | OXYGEN SATURATION: 97 % | RESPIRATION RATE: 18 BRPM | DIASTOLIC BLOOD PRESSURE: 63 MMHG | HEART RATE: 74 BPM | HEIGHT: 63 IN | WEIGHT: 293 LBS | BODY MASS INDEX: 51.91 KG/M2

## 2021-04-21 PROCEDURE — 99214 OFFICE O/P EST MOD 30 MIN: CPT | Performed by: NURSE PRACTITIONER

## 2021-04-21 RX ORDER — MULTIPLE VITAMINS W/ MINERALS TAB 9MG-400MCG
1 TAB ORAL DAILY
COMMUNITY

## 2021-07-15 ENCOUNTER — OFFICE VISIT (OUTPATIENT)
Dept: CARDIOLOGY | Facility: CLINIC | Age: 56
End: 2021-07-15

## 2021-07-15 VITALS
BODY MASS INDEX: 51.91 KG/M2 | SYSTOLIC BLOOD PRESSURE: 110 MMHG | HEART RATE: 60 BPM | OXYGEN SATURATION: 98 % | WEIGHT: 293 LBS | DIASTOLIC BLOOD PRESSURE: 70 MMHG | HEIGHT: 63 IN

## 2021-07-15 DIAGNOSIS — Z82.49 FAMILY HISTORY OF PREMATURE CAD: ICD-10-CM

## 2021-07-15 DIAGNOSIS — E78.5 DYSLIPIDEMIA: ICD-10-CM

## 2021-07-15 DIAGNOSIS — I10 ESSENTIAL HYPERTENSION: ICD-10-CM

## 2021-07-15 DIAGNOSIS — R00.2 PALPITATIONS: Primary | ICD-10-CM

## 2021-07-15 DIAGNOSIS — E66.01 MORBID OBESITY WITH BMI OF 50.0-59.9, ADULT (HCC): ICD-10-CM

## 2021-07-15 DIAGNOSIS — E11.9 TYPE 2 DIABETES MELLITUS WITHOUT COMPLICATION, WITHOUT LONG-TERM CURRENT USE OF INSULIN (HCC): ICD-10-CM

## 2021-07-15 PROCEDURE — 99214 OFFICE O/P EST MOD 30 MIN: CPT | Performed by: INTERNAL MEDICINE

## 2021-07-15 PROCEDURE — 93000 ELECTROCARDIOGRAM COMPLETE: CPT | Performed by: INTERNAL MEDICINE

## 2021-07-15 RX ORDER — TRAMADOL HYDROCHLORIDE 50 MG/1
TABLET ORAL
COMMUNITY
Start: 2021-05-24

## 2021-07-15 RX ORDER — METOPROLOL SUCCINATE 25 MG/1
25 TABLET, EXTENDED RELEASE ORAL DAILY
Qty: 90 TABLET | Refills: 3 | Status: SHIPPED | OUTPATIENT
Start: 2021-07-15 | End: 2021-10-20

## 2021-07-15 NOTE — PROGRESS NOTES
Subjective:     Encounter Date:07/15/2021      Patient ID: Nighat May is a 56 y.o. female.    Chief Complaint : Complaining of palpitations and dizziness.  History of hypertension dyslipidemia obesity diabetes.  CAD with stress test showing fixed inferior defect.  History of Present Illness      This is a 56-year-old with PMH of    -ASHD, stress test 2020 showing fixed inferior defect.  -Type 2 diabetes  -Morbid obesity  -Hypertension  -Dyslipidemia  -Rheumatoid arthritis  -, tubal ligation, hand surgery  -Former smoker, intermittently smokes  -Positive family history of premature CAD mother  at 59 with MI    Here for   follow-up.  Patient is complaining of palpitations where her heart is fluttering, beating fast, feeling flip-flops, occurring at rest.  Has some dizziness..  Denies any chest pain l, loss of consciousness.  Review of labs from 2020 revealed normal CMP and CBC.  Labs from 2020 reveal CMP with a glucose of 194 and potassium of 3.3.  Patient's arterial blood pressure is 110/70, heart rate 60, O2 sat of 98 % on room air.  Patient had Lexiscan Cardiolite 2020 which showed diaphragmatic attenuation versus consistent with inferior wall MI..    Assessment:      -Palpitations   -Abnormal stress with diaphragmatic attenuation versus inferior wall MI  -Diabetes, hypertension, dyslipidemia  -Morbid obesity  -Positive family for premature CAD  -Cigarette smoker    Recommendations / Plan:        Reviewed EKG results with patient.  Will change amlodipine to metoprolol to help with palpitations.  Reviewed BMI over 52 counseled on weight loss diet and exercise.  Patient had bariatric surgery has lost close to 90 pounds since last October.  Counseled on smoking cessation diet exercise.  Advised patient to call if her symptoms are worse.  Patient's potassium was low advised labs.  Patient says she has an appointment with PMD Thursday and is can get labs.  Will get labs from PMDs  office.  Follow-up with PMD for diabetes.              ECG 12 Lead    Date/Time: 7/15/2021 12:36 PM  Performed by: Umer Pandya MD  Authorized by: Umer Pandya MD   Comparison: compared with previous ECG from 2020  Comparison to previous ECG: EKG done 7/15/2021 reviewed/interpreted by me reveals sinus rhythm with rate of 68 bpm with no acute ST-T changes, no new change compared to EKG from 2020              The following portions of the patient's history were reviewed and updated as appropriate: allergies, current medications, past family history, past medical history, past social history, past surgical history and problem list.    Assessment:         [facility]     Diagnosis Plan   1. Palpitations     2. Essential hypertension     3. Morbid obesity with BMI of 50.0-59.9, adult (CMS/HCC)     4. Family history of premature CAD     5. Type 2 diabetes mellitus without complication, without long-term current use of insulin (CMS/HCC)     6. Dyslipidemia            Plan:               Past Medical History:  Past Medical History:   Diagnosis Date   • Asthma     exercise induced   • Depression    • DM type 2 (diabetes mellitus, type 2) (CMS/HCC)    • Enlarged thyroid     MD WATCHES   • Fibromyalgia, primary    • GERD (gastroesophageal reflux disease)    • History of cellulitis     LEFT LEG. AFTER A FALL.    • Hypertension    • Obesities, morbid (CMS/HCC)    • Rheumatoid arthritis (CMS/HCC)    • Seasonal allergies    • Shortness of breath    • Skin cancer     REMOVED FROM NECK   • Sleep apnea     BIPAP   • Tinnitus      Past Surgical History:  Past Surgical History:   Procedure Laterality Date   •  SECTION      x2   • ENDOSCOPY N/A 2020    Procedure: esophagogastroduodenoscopy with biopsy;  Surgeon: Tuyet Neely MD;  Location: [facility] ENDOSCOPY;  Service: General;  Laterality: N/A;   Normal   • GASTRIC SLEEVE LAPAROSCOPIC N/A 10/21/2020    Procedure: GASTRIC SLEEVE LAPAROSCOPIC;   Surgeon: Tuyet Neely MD;  Location: [facility];  Service: Bariatric;  Laterality: N/A;   • HAND SURGERY Right     UCL repair right thumb   • SKIN CANCER EXCISION     • TUBAL ABDOMINAL LIGATION        Allergies:  Allergies   Allergen Reactions   • Lisinopril Unknown (See Comments)     Fluid retention and cough       Home Meds:  Current Meds:     Current Outpatient Medications:   •  famotidine (PEPCID) 20 MG tablet, Take 10 mg by mouth Daily., Disp: , Rfl: 2  •  hydroCHLOROthiazide (MICROZIDE) 12.5 MG capsule, Take 12.5 mg by mouth Daily., Disp: , Rfl: 1  •  multivitamin with minerals tablet tablet, Take 1 tablet by mouth Daily., Disp: , Rfl:   •  OZEMPIC, 0.25 OR 0.5 MG/DOSE, 2 MG/1.5ML solution pen-injector, Inject 0.5 mg under the skin into the appropriate area as directed 1 (One) Time Per Week. SUNDAYS, Disp: , Rfl:   •  TRADJENTA 5 MG tablet tablet, Take 5 mg by mouth Daily., Disp: , Rfl: 0  •  traMADol (ULTRAM) 50 MG tablet, TAKE ONE TABLET BY MOUTH EVERY 6 HOURS AS NEEDED, Disp: , Rfl:   •  acetaminophen (TYLENOL) 500 MG tablet, Take 500-1,000 mg by mouth Every 6 (Six) Hours As Needed for Mild Pain ., Disp: , Rfl:   •  metoprolol succinate XL (TOPROL-XL) 25 MG 24 hr tablet, Take 1 tablet by mouth Daily., Disp: 90 tablet, Rfl: 3  •  potassium chloride (K-DUR,KLOR-CON) 20 MEQ CR tablet, Take 1 tablet by mouth Daily., Disp: 30 tablet, Rfl: 1  Social History:   Social History     Tobacco Use   • Smoking status: Current Every Day Smoker     Packs/day: 0.50     Years: 1.00     Pack years: 0.50     Types: Cigarettes     Start date: 2/28/2020   • Smokeless tobacco: Never Used   • Tobacco comment: Off and on   Substance Use Topics   • Alcohol use: Yes     Alcohol/week: 3.0 standard drinks     Types: 3 Cans of beer per week     Comment: SOCIALLY      Family History:  Family History   Problem Relation Age of Onset   • Diabetes Mother    • COPD Mother    • Heart disease Mother    • Anemia Mother    • Heart attack  "Mother         Ischemic   • Hypertension Mother    • No Known Problems Father    • Diabetes Brother    • Malig Hyperthermia Neg Hx               Review of Systems   Constitutional: Negative for malaise/fatigue.   Cardiovascular: Positive for palpitations. Negative for chest pain and leg swelling.   Respiratory: Negative for shortness of breath.    Skin: Negative for rash.   Neurological: Positive for dizziness. Negative for light-headedness and numbness.     All other systems are negative         Objective:     Physical Exam  /70   Pulse 60   Ht 160 cm (63\")   Wt 136 kg (299 lb)   SpO2 98%   BMI 52.97 kg/m²   General:  Appears in no acute distress, pleasant obese  Eyes: Sclera is anicteric,  conjunctiva is clear   HEENT:  No JVD.  No carotid bruits  Respiratory: Respirations regular and unlabored at rest.  Clear to auscultation  Cardiovascular: S1,S2 Regular rate and rhythm. No murmur, rub or gallop auscultated.   Gastrointestinal: Abdomen nondistended, soft  Extremities: No digital clubbing or cyanosis, no edema  Skin: Color pink. Skin warm and dry to touch. No rashes  No xanthoma  Neuro: Alert and awake, no lateralizing deficits appreciated    Lab Reviewed:                  "

## 2021-07-22 ENCOUNTER — OFFICE VISIT (OUTPATIENT)
Dept: BARIATRICS/WEIGHT MGMT | Facility: CLINIC | Age: 56
End: 2021-07-22

## 2021-07-22 VITALS
DIASTOLIC BLOOD PRESSURE: 78 MMHG | BODY MASS INDEX: 51.91 KG/M2 | OXYGEN SATURATION: 97 % | RESPIRATION RATE: 16 BRPM | HEART RATE: 63 BPM | HEIGHT: 63 IN | TEMPERATURE: 97.4 F | WEIGHT: 293 LBS | SYSTOLIC BLOOD PRESSURE: 119 MMHG

## 2021-07-22 PROCEDURE — 99214 OFFICE O/P EST MOD 30 MIN: CPT | Performed by: NURSE PRACTITIONER

## 2021-07-22 NOTE — PROGRESS NOTES
[facility] BARIATRIC SURGERY  [address] [place] [address]  [address] [place] [address]  Dept: [phone]  7/22/2021      Nighat May.  [id number]  [id number]  1965  female      Chief Complaint   Patient presents with   • Follow-up     9 mo GS        Post-Op Bariatric Surgery:   Nighat May is status post procedure listed above  HPI:     Wt Readings from Last 10 Encounters:   07/22/21 136 kg (299 lb 3.2 oz)   07/15/21 136 kg (299 lb)   04/21/21 (!) 143 kg (316 lb 3.2 oz)   01/20/21 (!) 150 kg (330 lb)   11/20/20 (!) 159 kg (351 lb 3.2 oz)   10/26/20 (!) 164 kg (361 lb 12.8 oz)   10/21/20 (!) 167 kg (367 lb 9.6 oz)   10/08/20 (!) 171 kg (377 lb)   08/31/20 (!) 173 kg (380 lb 9.6 oz)   08/28/20 (!) 174 kg (383 lb 9.6 oz)        Today's weight is 136 kg (299 lb 3.2 oz) pounds, today's BMI is Body mass index is 53 kg/m².,@ has a  loss of 17 pounds since the last visit and@ weight loss since surgery is 78 pounds. The patient reports a decreased portion size and loss of appetite.      Nighta May denies nausea or vomiting or GERD      Diet and Exercise: Diet history reviewed and discussed with the patient. Weight loss/gains to date discussed with the patient. The patient states they are eating 60+ grams of protein per day. She reports eating 3 meals per day, a typical portion size of 1 cup, eating 1 snacks per day, drinking 8 or more 8-oz. glasses of water per day, no carbonated beverage consumption and exercising regularly.     Breakfast: protein shake   Lunch: toasted bread with tuna fish and protein shake   Dinner: bowel of veggie soup and corn bread  Protein shake after dinner   Drinks: protein shakes, decaf tea and coffee, water, no carbonation   Exercise: walking, nothing on a consistent basis     Supplements: protein shakes.  MVI and calcium    Pt is still smoking     Review of Systems   Constitutional: Positive for activity  change, appetite change and fatigue.   Respiratory: Negative.    Cardiovascular: Negative.    Gastrointestinal: Negative.    Musculoskeletal: Positive for arthralgias, back pain and myalgias.       Patient Active Problem List   Diagnosis   • Carpal tunnel syndrome, bilateral upper limbs   • Chest pain   • Depression   • Diabetes mellitus (CMS/HCC)   • Elevated erythrocyte sedimentation rate   • Encounter for screening for osteoporosis   • Fatigue   • Fibromyalgia   • HTN (hypertension)   • Seronegative arthritis   • Inflammatory arthritis   • Joint pain   • Arthralgia of left wrist   • Body mass index 50.0-59.9, adult (CMS/HCC)   • Obesity   • Other long term (current) drug therapy   • Other specified disorders of bone density and structure, other site   • Rotator cuff tendonitis   • Urinary tract infection   • Vitamin D deficiency   • Morbid obesity (CMS/HCC)   • Pre-operative examination   • Body mass index (BMI) of 60.0-69.9 in adult (CMS/HCC)   • Super obese   • Super-super obese (CMS/HCC)       Past Medical History:   Diagnosis Date   • Asthma     exercise induced   • Depression    • DM type 2 (diabetes mellitus, type 2) (CMS/HCC)    • Enlarged thyroid     MD WATCHES   • Fibromyalgia, primary    • GERD (gastroesophageal reflux disease)    • History of cellulitis     LEFT LEG. AFTER A FALL.    • Hypertension    • Obesities, morbid (CMS/HCC)    • Rheumatoid arthritis (CMS/HCC)    • Seasonal allergies    • Shortness of breath    • Skin cancer     REMOVED FROM NECK   • Sleep apnea     BIPAP   • Tinnitus        The following portions of the patient's history were reviewed and updated as appropriate: allergies, current medications, past family history, past medical history, past social history, past surgical history and problem list.    Vitals:    07/22/21 1007   BP: 119/78   Pulse: 63   Resp: 16   Temp: 97.4 °F (36.3 °C)   SpO2: 97%       Physical Exam  Constitutional:       Appearance: Normal appearance. She is  obese.   Cardiovascular:      Rate and Rhythm: Normal rate and regular rhythm.   Pulmonary:      Effort: Pulmonary effort is normal.      Breath sounds: Normal breath sounds.   Abdominal:      General: Abdomen is flat. Bowel sounds are normal.      Palpations: Abdomen is soft.   Skin:     General: Skin is warm and dry.   Neurological:      General: No focal deficit present.      Mental Status: She is alert and oriented to person, place, and time.   Psychiatric:         Mood and Affect: Mood normal.         Behavior: Behavior normal.         Thought Content: Thought content normal.         Judgment: Judgment normal.           Assessment:   Post-op, the patient is still loosing weight, but her weight loss has slowed. She is getting 60+ grams of protein in her diet a day, but she is not exercising very much. encourage 20-30 minutes of exercise 2-3 days a week. Pt is also still smoking 1/2 PPD. Encourage smoking cessation as smoking increases her risk for developing a marginal ulcer. [facility]. Plan to follow up in 3 months for 1 year apt.     Plan:     Encouraged patient to be sure to get plenty of lean protein per day through small frequent meals all with a protein source.   Activity restrictions: none.   Recommended patient be sure to get at least 70 grams of protein per day by eating small, frequent meals all with high lean protein choices. Be sure to limit/cut back on daily carbohydrate intake. Discussed with the patient the recommended amount of water per day to intake- half of body weight in ounces. Reviewed vitamin requirements. Be sure to do routine exercise, 150 minutes per week minimum, including both cardio and strength training.     Instructions / Recommendations: dietary counseling recommended, recommended a daily protein intake of  grams, vitamin supplement(s) recommended, recommended exercising at least 150 minutes per week, behavior  modifications recommended and instructed to call the office for concerns, questions, or problems.     The patient was instructed to follow up in 3 months.     The patient was counseled regarding. Total time spent face to face was 30 minutes and 25 minutes was spent counseling.     EMILEE Montoya  [facility]

## 2021-10-20 ENCOUNTER — OFFICE VISIT (OUTPATIENT)
Dept: BARIATRICS/WEIGHT MGMT | Facility: CLINIC | Age: 56
End: 2021-10-20

## 2021-10-20 VITALS
WEIGHT: 290.6 LBS | TEMPERATURE: 96.9 F | RESPIRATION RATE: 18 BRPM | OXYGEN SATURATION: 100 % | HEIGHT: 63 IN | SYSTOLIC BLOOD PRESSURE: 147 MMHG | DIASTOLIC BLOOD PRESSURE: 83 MMHG | HEART RATE: 56 BPM | BODY MASS INDEX: 51.49 KG/M2

## 2021-10-20 PROCEDURE — 99214 OFFICE O/P EST MOD 30 MIN: CPT | Performed by: NURSE PRACTITIONER

## 2021-10-20 NOTE — PROGRESS NOTES
[facility] BARIATRIC SURGERY  [address] [place] [address]  [address] [place] [address]  Dept: [phone]  10/20/2021      Nighat May.  [id number]  [id number]  1965  female      Chief Complaint   Patient presents with   • Follow-up     1 yr GS        Post-Op Bariatric Surgery:   Nighat May is status post procedure listed above  HPI:     Wt Readings from Last 10 Encounters:   10/20/21 132 kg (290 lb 9.6 oz)   07/22/21 136 kg (299 lb 3.2 oz)   07/15/21 136 kg (299 lb)   04/21/21 (!) 143 kg (316 lb 3.2 oz)   01/20/21 (!) 150 kg (330 lb)   11/20/20 (!) 159 kg (351 lb 3.2 oz)   10/26/20 (!) 164 kg (361 lb 12.8 oz)   10/21/20 (!) 167 kg (367 lb 9.6 oz)   10/08/20 (!) 171 kg (377 lb)   08/31/20 (!) 173 kg (380 lb 9.6 oz)        Today's weight is 132 kg (290 lb 9.6 oz) pounds, today's BMI is Body mass index is 51.48 kg/m².,@ has a  loss of 9 pounds since the last visit and@ weight loss since surgery is 87 pounds. The patient reports a decreased portion size and loss of appetite.      Nighat May denies nausea or vomiting or GERD      Diet and Exercise: Diet history reviewed and discussed with the patient. Weight loss/gains to date discussed with the patient. The patient states they are eating 60 + grams of protein per day. She reports eating 3 meals per day, a typical portion size of 8-10 oz , eating 1 snacks per day, drinking 8 or more 8-oz. glasses of water per day, no carbonated beverage consumption and exercising regularly.     Breakfast: protein shake  Lunch: turkey sandwich with chips PRN  Dinner: smoked sausage and cabbage   Snacks: protein shake   Drinks: protein shakes, water, tea- unsweet, water,   Exercise: yard work     Stopped smoking 2 weeks ago       Supplements: protein shakes .     Review of Systems   Constitutional: Positive for activity change, appetite change and fatigue.   Respiratory: Negative.     Cardiovascular: Negative.    Gastrointestinal: Negative.    Musculoskeletal: Positive for arthralgias, back pain and myalgias.        Knee pain       Patient Active Problem List   Diagnosis   • Carpal tunnel syndrome, bilateral upper limbs   • Chest pain   • Depression   • Diabetes mellitus ([facility])   • Elevated erythrocyte sedimentation rate   • Encounter for screening for osteoporosis   • Fatigue   • Fibromyalgia   • HTN (hypertension)   • Seronegative arthritis   • Inflammatory arthritis   • Joint pain   • Arthralgia of left wrist   • Body mass index 50.0-59.9, adult ([facility])   • Obesity   • Other long term (current) drug therapy   • Other specified disorders of bone density and structure, other site   • Rotator cuff tendonitis   • Urinary tract infection   • Vitamin D deficiency   • Morbid obesity ([facility])   • Pre-operative examination   • Body mass index (BMI) of 60.0-69.9 in adult ([facility])   • Super obese   • Super-super obese ([facility])       Past Medical History:   Diagnosis Date   • Asthma     exercise induced   • Depression    • DM type 2 (diabetes mellitus, type 2) ([facility])    • Enlarged thyroid     MD WATCHES   • Fibromyalgia, primary    • GERD (gastroesophageal reflux disease)    • History of cellulitis     LEFT LEG. AFTER A FALL.    • Hypertension    • Obesities, morbid ([facility])    • Rheumatoid arthritis ([facility])    • Seasonal allergies    • Shortness of breath    • Skin cancer     REMOVED FROM NECK   • Sleep apnea     BIPAP   • Tinnitus        The following portions of the patient's history were reviewed and updated as appropriate: allergies, current medications, past family history, past medical history, past social history, past surgical history and problem list.    Vitals:    10/20/21 1058   BP: 147/83   Pulse: 56   Resp: 18   Temp: 96.9 °F (36.1 °C)   SpO2: 100%       Physical Exam  Constitutional:       Appearance: Normal appearance. She is obese.   Cardiovascular:      Rate and Rhythm: Normal rate and regular rhythm.    Pulmonary:      Effort: Pulmonary effort is normal.      Breath sounds: Normal breath sounds.   Abdominal:      General: Abdomen is flat. Bowel sounds are normal.      Palpations: Abdomen is soft.   Skin:     General: Skin is warm and dry.   Neurological:      General: No focal deficit present.      Mental Status: She is alert and oriented to person, place, and time.   Psychiatric:         Mood and Affect: Mood normal.         Behavior: Behavior normal.         Thought Content: Thought content normal.         Judgment: Judgment normal.           Assessment:   Post-op, the patient has lost 9 pounds since last visit. She stopped smoking and is trying to be more active outside. She is getting 60+ grams of protein in her diet a day. Encourage 20-30 minutes of exercise 2-3 days a week. Pt is taking ozempic for her diabetes. I did discuss with her about how sometimes people experience a side effect of weight loss with this medication and to discuss with her PCP if they would like to adjust this dose to possible help with further weight loss. I did however mention that this may not be safe though due to the fact her blood sugars area already running in the low 100's and increasing this dose may may them drop further. Pt encouraged to follow up with PCP for this and to not adjust medication on her own without speaking to them first. Pt states she had labs done with PCP. Will obtain these labs before ordering new labs. Plan to follow up in 6 months.     Plan:     Encouraged patient to be sure to get plenty of lean protein per day through small frequent meals all with a protein source.   Activity restrictions: none.   Recommended patient be sure to get at least 70 grams of protein per day by eating small, frequent meals all with high lean protein choices. Be sure to limit/cut back on daily carbohydrate intake. Discussed with the patient the recommended amount of water per day to intake- half of body weight in ounces.  Reviewed vitamin requirements. Be sure to do routine exercise, 150 minutes per week minimum, including both cardio and strength training.     Instructions / Recommendations: dietary counseling recommended, recommended a daily protein intake of  grams, vitamin supplement(s) recommended, recommended exercising at least 150 minutes per week, behavior modifications recommended and instructed to call the office for concerns, questions, or problems.     The patient was instructed to follow up in 6 months .     The patient was counseled regarding. Total time spent face to face was 30 minutes and 25 minutes was spent counseling.     EMILEE Montoya  [facility] Bariatrics and General Surgery

## 2022-04-18 ENCOUNTER — OFFICE VISIT (OUTPATIENT)
Dept: BARIATRICS/WEIGHT MGMT | Facility: CLINIC | Age: 57
End: 2022-04-18

## 2022-04-18 VITALS
HEIGHT: 63 IN | HEART RATE: 72 BPM | WEIGHT: 276.4 LBS | BODY MASS INDEX: 48.97 KG/M2 | RESPIRATION RATE: 16 BRPM | SYSTOLIC BLOOD PRESSURE: 138 MMHG | OXYGEN SATURATION: 100 % | DIASTOLIC BLOOD PRESSURE: 77 MMHG

## 2022-04-18 DIAGNOSIS — E66.01 OBESITY, CLASS III, BMI 40-49.9 (MORBID OBESITY): Primary | ICD-10-CM

## 2022-04-18 PROCEDURE — 99214 OFFICE O/P EST MOD 30 MIN: CPT | Performed by: NURSE PRACTITIONER

## 2022-04-18 RX ORDER — METOPROLOL SUCCINATE 25 MG/1
25 TABLET, EXTENDED RELEASE ORAL DAILY
COMMUNITY
Start: 2022-04-12 | End: 2022-08-08

## 2022-04-18 RX ORDER — GABAPENTIN 100 MG/1
300 CAPSULE ORAL
COMMUNITY
Start: 2022-04-06

## 2022-04-18 NOTE — PROGRESS NOTES
[facility] BARIATRIC SURGERY  [address] [place] [address]  [address] [place] [address]  Dept: [phone]  4/18/2022      Nighat May.  [id number]  [id number]  1965  female      Chief Complaint   Patient presents with   • Follow-up     6 mo GS        Post-Op Bariatric Surgery:   Nighat May is status post procedure listed above  HPI:     Wt Readings from Last 10 Encounters:   04/18/22 125 kg (276 lb 6.4 oz)   10/20/21 132 kg (290 lb 9.6 oz)   07/22/21 136 kg (299 lb 3.2 oz)   07/15/21 136 kg (299 lb)   04/21/21 (!) 143 kg (316 lb 3.2 oz)   01/20/21 (!) 150 kg (330 lb)   11/20/20 (!) 159 kg (351 lb 3.2 oz)   10/26/20 (!) 164 kg (361 lb 12.8 oz)   10/21/20 (!) 167 kg (367 lb 9.6 oz)   10/08/20 (!) 171 kg (377 lb)        Today's weight is 125 kg (276 lb 6.4 oz) pounds, today's BMI is Body mass index is 48.96 kg/m².,@ has a  loss of 23 pounds since the last visit and@ weight loss since surgery is 101 pounds. The patient reports a decreased portion size and loss of appetite.      Nighat May denies nausea or vomiting or GERD      Diet and Exercise: Diet history reviewed and discussed with the patient. Weight loss/gains to date discussed with the patient. The patient states they are eating 60 grams of protein per day. She reports eating 3 meals per day, a typical portion size of  cup, eating 1 snacks per day, drinking 8 or more 8-oz. glasses of water per day, no carbonated beverage consumption and exercising some by walking.     Breakfast: protein shake   Lunch: protein shake  Dinner: meat and veggie, beef and broccoli with rice   Snacks: grapes, popcorn, sandwich , varies   Drinks: tea- with splenda, coffee, water, no carbonation   Exercise: started part time job in January, 3-4 days a week working at [employer] , having some foot pain- seeing a podiatrist     Supplements: protein shake.   Multi vitamin and calcium ,     On ozempic  1 time a week , over 1 year     Review of Systems   Constitutional: Positive for activity change and appetite change.   Respiratory: Negative.    Cardiovascular: Negative.    Gastrointestinal: Negative.    Musculoskeletal: Positive for arthralgias, back pain and myalgias.       Patient Active Problem List   Diagnosis   • Carpal tunnel syndrome, bilateral upper limbs   • Chest pain   • Depression   • Diabetes mellitus (HCC)   • Elevated erythrocyte sedimentation rate   • Encounter for screening for osteoporosis   • Fatigue   • Fibromyalgia   • HTN (hypertension)   • Seronegative arthritis   • Inflammatory arthritis   • Joint pain   • Arthralgia of left wrist   • Body mass index 50.0-59.9, adult ([facility])   • Obesity   • Other long term (current) drug therapy   • Other specified disorders of bone density and structure, other site   • Rotator cuff tendonitis   • Urinary tract infection   • Vitamin D deficiency   • Morbid obesity ([facility])   • Pre-operative examination   • Body mass index (BMI) of 60.0-69.9 in adult ([facility])   • Super obese   • Super-super obese ([facility])       Past Medical History:   Diagnosis Date   • Asthma     exercise induced   • Depression    • DM type 2 (diabetes mellitus, type 2) ([facility])    • Enlarged thyroid     MD WATCHES   • Fibromyalgia, primary    • GERD (gastroesophageal reflux disease)    • History of cellulitis     LEFT LEG. AFTER A FALL.    • Hypertension    • Obesities, morbid ([facility])    • Rheumatoid arthritis ([facility])    • Seasonal allergies    • Shortness of breath    • Skin cancer     REMOVED FROM NECK   • Sleep apnea     BIPAP   • Tinnitus        The following portions of the patient's history were reviewed and updated as appropriate: allergies, current medications, past family history, past medical history, past social history, past surgical history and problem list.    Vitals:    04/18/22 1110   BP: 138/77   Pulse: 72   Resp: 16   SpO2: 100%       Physical Exam  Constitutional:       Appearance:  Normal appearance. She is obese.   Cardiovascular:      Pulses: Normal pulses.   Pulmonary:      Effort: Pulmonary effort is normal.      Breath sounds: Normal breath sounds.   Abdominal:      General: Abdomen is flat. Bowel sounds are normal.      Palpations: Abdomen is soft.   Skin:     General: Skin is warm and dry.   Neurological:      General: No focal deficit present.      Mental Status: She is alert and oriented to person, place, and time.   Psychiatric:         Mood and Affect: Mood normal.         Behavior: Behavior normal.         Thought Content: Thought content normal.         Judgment: Judgment normal.           Assessment:   Post-op, the patient is doing well. She has lost over 100 pounds since surgery. Pt is getting 60+ grams of protein in her diet a day. 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 Encourage 20-30 minutes of exercise outside of normal activity. Plan to follow up in 6 months with labs then.  Plan:     Encouraged patient to be sure to get plenty of lean protein per day through small frequent meals all with a protein source.   Activity restrictions: none.   Recommended patient be sure to get at least 70 grams of protein per day by eating small, frequent meals all with high lean protein choices. Be sure to limit/cut back on daily carbohydrate intake. Discussed with the patient the recommended amount of water per day to intake- half of body weight in ounces. Reviewed vitamin requirements. Be sure to do routine exercise, 150 minutes per week minimum, including both cardio and strength training.     Instructions / Recommendations: dietary counseling recommended, recommended a daily protein intake of  grams, vitamin supplement(s) recommended, recommended exercising at least 150 minutes per week, behavior modifications recommended and instructed to call the office for concerns, questions, or problems.     The patient  was instructed to follow up in 6 months .     The patient was counseled regarding. Total time spent face to face was 30 minutes and 25 minutes was spent counseling.     Sania Correa APRMALISSA  [facility]ariat[facility] and General surgery

## 2022-08-08 RX ORDER — METOPROLOL SUCCINATE 25 MG/1
TABLET, EXTENDED RELEASE ORAL
Qty: 90 TABLET | Refills: 3 | Status: SHIPPED | OUTPATIENT
Start: 2022-08-08

## 2022-08-08 NOTE — TELEPHONE ENCOUNTER
Rx Refill Note  Requested Prescriptions     Pending Prescriptions Disp Refills   • metoprolol succinate XL (TOPROL-XL) 25 MG 24 hr tablet [Pharmacy Med Name: metoprolol succinate ER 25 mg tablet,extended release 24 hr] 90 tablet 3     Sig: TAKE ONE TABLET BY MOUTH EVERY DAY      Last office visit with prescribing clinician: 7/15/2021      Next office visit with prescribing clinician: 8/24/2022            Caitlin Vergara MA  08/08/22, 12:57 EDT

## 2022-08-24 ENCOUNTER — OFFICE VISIT (OUTPATIENT)
Dept: CARDIOLOGY | Facility: CLINIC | Age: 57
End: 2022-08-24

## 2022-08-24 VITALS
HEIGHT: 63 IN | WEIGHT: 264 LBS | BODY MASS INDEX: 46.78 KG/M2 | SYSTOLIC BLOOD PRESSURE: 147 MMHG | HEART RATE: 59 BPM | OXYGEN SATURATION: 100 % | DIASTOLIC BLOOD PRESSURE: 77 MMHG

## 2022-08-24 DIAGNOSIS — E78.5 DYSLIPIDEMIA: ICD-10-CM

## 2022-08-24 DIAGNOSIS — E66.01 MORBID OBESITY WITH BMI OF 40.0-44.9, ADULT: ICD-10-CM

## 2022-08-24 DIAGNOSIS — E11.9 TYPE 2 DIABETES MELLITUS WITHOUT COMPLICATION, WITHOUT LONG-TERM CURRENT USE OF INSULIN: ICD-10-CM

## 2022-08-24 DIAGNOSIS — I10 ESSENTIAL HYPERTENSION: Primary | ICD-10-CM

## 2022-08-24 PROCEDURE — 93000 ELECTROCARDIOGRAM COMPLETE: CPT | Performed by: INTERNAL MEDICINE

## 2022-08-24 PROCEDURE — 99214 OFFICE O/P EST MOD 30 MIN: CPT | Performed by: INTERNAL MEDICINE

## 2022-08-24 RX ORDER — LANOLIN ALCOHOL/MO/W.PET/CERES
500 CREAM (GRAM) TOPICAL WEEKLY
COMMUNITY

## 2022-08-24 RX ORDER — ESCITALOPRAM OXALATE 5 MG/1
5 TABLET ORAL DAILY
COMMUNITY
Start: 2022-08-15

## 2022-10-19 ENCOUNTER — OFFICE VISIT (OUTPATIENT)
Dept: BARIATRICS/WEIGHT MGMT | Facility: CLINIC | Age: 57
End: 2022-10-19

## 2022-10-19 VITALS
SYSTOLIC BLOOD PRESSURE: 127 MMHG | OXYGEN SATURATION: 93 % | DIASTOLIC BLOOD PRESSURE: 66 MMHG | HEART RATE: 66 BPM | BODY MASS INDEX: 45.64 KG/M2 | HEIGHT: 63 IN | WEIGHT: 257.6 LBS

## 2022-10-19 DIAGNOSIS — E66.01 OBESITY, CLASS III, BMI 40-49.9 (MORBID OBESITY): Primary | ICD-10-CM

## 2022-10-19 DIAGNOSIS — L98.7 EXCESS SKIN OF ABDOMEN: ICD-10-CM

## 2022-10-19 PROCEDURE — 99213 OFFICE O/P EST LOW 20 MIN: CPT | Performed by: NURSE PRACTITIONER

## 2022-10-19 RX ORDER — AMOXICILLIN 500 MG/1
CAPSULE ORAL
COMMUNITY
Start: 2022-10-18

## 2022-10-19 RX ORDER — ESCITALOPRAM OXALATE 10 MG/1
10 TABLET ORAL DAILY
COMMUNITY
Start: 2022-10-03

## 2022-10-19 NOTE — PROGRESS NOTES
[facility]K BAR SURG [facility] BARIATRIC SURGERY  [address] [place] [address]  [address] [place] [address]  Dept: [phone]  10/19/2022      Nighat May.  [id number]  [id number]  1965  female    Date of last surgery: 10/21/2020Gastric Sleeve Laparoscopic      Chief Complaint: BH Post-Op Bariatric Surgery:   Nighat May is status post procedure listed above  HPI:     Wt Readings from Last 10 Encounters:   10/19/22 117 kg (257 lb 9.6 oz)   08/24/22 120 kg (264 lb)   04/18/22 125 kg (276 lb 6.4 oz)   10/20/21 132 kg (290 lb 9.6 oz)   07/22/21 136 kg (299 lb 3.2 oz)   07/15/21 136 kg (299 lb)   04/21/21 (!) 143 kg (316 lb 3.2 oz)   01/20/21 (!) 150 kg (330 lb)   11/20/20 (!) 159 kg (351 lb 3.2 oz)   10/26/20 (!) 164 kg (361 lb 12.8 oz)        Today's weight is 117 kg (257 lb 9.6 oz) pounds,@,@ has a  loss of 19 pounds since the last visit and@ weight loss since surgery is 120 pounds. The patient reports a decreased portion size and loss of appetite.      Nighat May denies reflux/heartburn, nausea and vomiting     Diet and Exercise: Diet history reviewed and discussed with the patient. Weight loss/gains to date discussed with the patient.     She reports eating 2 meals per day, a typical portion size of 1 - 1.5 cup, eating 1 snacks per day, drinking 8 or more 8-oz. glasses of water per day, no carbonated beverage consumption and exercising regularly.       The patient states they are eating 60 grams of protein per day.     Breakfast: protein shake   Lunch: protein shake  2-6 pm working   Dinner: chicken cordonbleu, roasted potatoes and carrots   Snacks: chips, snack cake prn, left overs from night before   Drinks: protein shake, coffee, tea- unsweet, no carbonation, water   Exercise: 4 days a week, working 4 hrs each day       Multi vitamin and calcium     Review of Systems   Constitutional: Positive for activity change and appetite change.    Respiratory: Negative.    Cardiovascular: Negative.    Gastrointestinal: Negative.    Musculoskeletal: Positive for myalgias.        Foot pain         Patient Active Problem List   Diagnosis   • Carpal tunnel syndrome, bilateral upper limbs   • Chest pain   • Depression   • Diabetes mellitus ([facility])   • Elevated erythrocyte sedimentation rate   • Encounter for screening for osteoporosis   • Fatigue   • Fibromyalgia   • HTN (hypertension)   • Seronegative arthritis   • Inflammatory arthritis   • Joint pain   • Arthralgia of left wrist   • Body mass index 50.0-59.9, adult ([facility])   • Obesity   • Other long term (current) drug therapy   • Other specified disorders of bone density and structure, other site   • Rotator cuff tendonitis   • Urinary tract infection   • Vitamin D deficiency   • Morbid obesity ([facility])   • Pre-operative examination   • Body mass index (BMI) of 60.0-69.9 in adult ([facility])   • Super obese   • Super-super obese ([facility])       Past Medical History:   Diagnosis Date   • Asthma     exercise induced   • Depression    • DM type 2 (diabetes mellitus, type 2) ([facility])    • Enlarged thyroid     MD WATCHES   • Fibromyalgia, primary    • GERD (gastroesophageal reflux disease)    • History of cellulitis     LEFT LEG. AFTER A FALL.    • Hypertension    • Obesities, morbid ([facility])    • Rheumatoid arthritis ([facility])    • Seasonal allergies    • Shortness of breath    • Skin cancer     REMOVED FROM NECK   • Sleep apnea     BIPAP   • Tinnitus      Past Surgical History:   Procedure Laterality Date   • ENDOSCOPY N/A 2020    Procedure: esophagogastroduodenoscopy with biopsy;  Surgeon: Tuyet Neely MD;  Location: [facility] ENDOSCOPY;  Service: General;  Laterality: N/A;   Normal   • GASTRIC SLEEVE LAPAROSCOPIC N/A 10/21/2020    Procedure: GASTRIC SLEEVE LAPAROSCOPIC;  Surgeon: Tuyet Neely MD;  Location:  [facility];  Service: Bariatric;  Laterality: N/A;   •  SECTION      x2   • HAND SURGERY Right     UCL repair right  thumb   • SKIN CANCER EXCISION     • TUBAL ABDOMINAL LIGATION        The following portions of the patient's history were reviewed and updated as appropriate: allergies, current medications, past medical history, past social history, past surgical history and problem list.    Height 63 inches, weight 257 pounds , BMI 45.63  Physical Exam  Constitutional:       Appearance: Normal appearance. She is obese.   Cardiovascular:      Pulses: Normal pulses.   Pulmonary:      Effort: Pulmonary effort is normal.   Abdominal:      General: Abdomen is flat.      Palpations: Abdomen is soft.   Skin:     General: Skin is warm and dry.   Neurological:      General: No focal deficit present.      Mental Status: She is alert and oriented to person, place, and time.   Psychiatric:         Mood and Affect: Mood normal.         Behavior: Behavior normal.         Thought Content: Thought content normal.         Judgment: Judgment normal.             Assessment:   Patient Active Problem List   Diagnosis   • Carpal tunnel syndrome, bilateral upper limbs   • Chest pain   • Depression   • Diabetes mellitus ([facility])   • Elevated erythrocyte sedimentation rate   • Encounter for screening for osteoporosis   • Fatigue   • Fibromyalgia   • HTN (hypertension)   • Seronegative arthritis   • Inflammatory arthritis   • Joint pain   • Arthralgia of left wrist   • Body mass index 50.0-59.9, adult ([facility])   • Obesity   • Other long term (current) drug therapy   • Other specified disorders of bone density and structure, other site   • Rotator cuff tendonitis   • Urinary tract infection   • Vitamin D deficiency   • Morbid obesity ([facility])   • Pre-operative examination   • Body mass index (BMI) of 60.0-69.9 in adult ([facility])   • Super obese   • Super-super obese ([facility])       Post-op, the patient is doing well. She is down 19 pounds since last visit. She is getting 60+ grams of protein in her diet a day. Pt did start a new job at the beginning of the year and is now  getting more activity in this way than before. Pt did inquire about excess skin removal. I did inform the pt a lot of times, it is best to have stabilization of weight loss for a considerable amount of time before considering excess skin removal, but could refer the pt to DR. Varghese's office for consultation. Plan to check yearly labs and follow up in 1 year.      Plan:     Encouraged patient to be sure to get plenty of lean protein per day through small frequent meals all with a protein source.   Activity restrictions: none.   Recommended patient be sure to get at least 70 grams of protein per day by eating small, frequent meals all with high lean protein choices. Be sure to limit/cut back on daily carbohydrate intake. Discussed with the patient the recommended amount of water per day to intake- half of body weight in ounces. Reviewed vitamin requirements. Be sure to do routine exercise, 150 minutes per week minimum, including both cardio and strength training.     Instructions / Recommendations: dietary counseling recommended, recommended a daily protein intake of  grams, vitamin supplement(s) recommended, recommended exercising at least 150 minutes per week, behavior modifications recommended and instructed to call the office for concerns, questions, or problems.     The patient was instructed to follow up in 12 months.     The patient was counseled regarding ozempic, diet and exercise. Total time spent face to face was 20 minutes and 15 minutes was spent counseling.     EMILEE Montoya  [facility]ariat[facility]

## 2023-08-24 ENCOUNTER — OFFICE VISIT (OUTPATIENT)
Dept: CARDIOLOGY | Facility: CLINIC | Age: 58
End: 2023-08-24
Payer: COMMERCIAL

## 2023-08-24 VITALS
BODY MASS INDEX: 43.71 KG/M2 | HEIGHT: 64 IN | WEIGHT: 256 LBS | SYSTOLIC BLOOD PRESSURE: 153 MMHG | HEART RATE: 64 BPM | DIASTOLIC BLOOD PRESSURE: 80 MMHG

## 2023-08-24 DIAGNOSIS — E11.9 TYPE 2 DIABETES MELLITUS WITHOUT COMPLICATION, WITHOUT LONG-TERM CURRENT USE OF INSULIN: ICD-10-CM

## 2023-08-24 DIAGNOSIS — R42 DIZZINESS: Primary | ICD-10-CM

## 2023-08-24 DIAGNOSIS — E66.01 MORBID OBESITY WITH BMI OF 40.0-44.9, ADULT: ICD-10-CM

## 2023-08-24 DIAGNOSIS — E78.5 DYSLIPIDEMIA: ICD-10-CM

## 2023-08-24 DIAGNOSIS — I10 ESSENTIAL HYPERTENSION: ICD-10-CM

## 2023-08-24 RX ORDER — ASPIRIN 81 MG/1
81 TABLET ORAL DAILY
Qty: 90 TABLET | Refills: 3 | Status: SHIPPED | OUTPATIENT
Start: 2023-08-24

## 2023-08-24 RX ORDER — METOPROLOL SUCCINATE 50 MG/1
50 TABLET, EXTENDED RELEASE ORAL DAILY
Qty: 90 TABLET | Refills: 3 | Status: SHIPPED | OUTPATIENT
Start: 2023-08-24

## 2023-08-24 RX ORDER — DULOXETIN HYDROCHLORIDE 30 MG/1
CAPSULE, DELAYED RELEASE ORAL
COMMUNITY
Start: 2023-06-08

## 2023-08-24 RX ORDER — ATORVASTATIN CALCIUM 10 MG/1
10 TABLET, FILM COATED ORAL DAILY
Qty: 90 TABLET | Refills: 3 | Status: SHIPPED | OUTPATIENT
Start: 2023-08-24

## 2023-08-24 NOTE — PROGRESS NOTES
Subjective:     Encounter Date:2023      Patient ID: Nighat May is a 58 y.o. female.    Chief Complaint and history of present illness:       Follow-up for CAD with fixed inferior defect, hypertension, dyslipidemia, diabetes     History of Present Illness       Ms. Nighat May has PMH of     -ASHD, stress test 2020 showing fixed inferior defect.  -Type 2 diabetes  -Morbid obesity  -Hypertension  -Dyslipidemia  -Rheumatoid arthritis  -Right neck goiter  -, tubal ligation, hand surgery  -Former smoker, intermittently smokes  -Positive family history of premature CAD mother  at 59 with MI     Here for   follow-up.  Patient denies any chest pain or shortness of breath.  Has dizziness when he is lying down.  Denies any loss of consciousness.        Patient's arterial blood pressure is 141/78 repeat was 153/80, heart rate 64 bpm.  BMI is over 40.     Patient had Lexiscan Cardiolite 2020 which showed diaphragmatic attenuation versus consistent with inferior wall MI..     Review of labs from 2020 revealed normal CMP and CBC.  Labs from 2020 reveal CMP with a glucose of 194 and potassium of 3.3.           Assessment:       -Palpitations   -Dizziness  -Abnormal stress with diaphragmatic attenuation versus inferior wall MI  -Diabetes, hypertension, dyslipidemia  -Morbid obesity  -Positive family for premature CAD  -Cigarette smoker     Recommendations / Plan:         Reviewed EKG results with patient.  Patient's blood pressure is not controlled will increase metoprolol.  Patient had stress test showing heart attack in the past.  Will benefit from aspirin and statins.  We will start him empirically on statins and get labs done in PMDs office.  Reviewed BMI over 40, counseled on weight loss diet and exercise.     Counseled on smoking cessation diet exercise.  Advised patient to call if her symptoms are worse.  Follow-up with PMD for hyperglycemia and diabetes.  Patient says she works  and walks up to 4 hours in her workday without chest pain or shortness of breath we will continue to monitor symptomatically.  Patient has goiter on the right side of the neck.  She says she is following up with ENT         Echocardiogram 6/9/2020: Normal LV systolic function EF of 60% with mild to moderate RV enlargement, moderate left atrial enlargement severe right atrial enlargement.  Mild MR.    Lexiscan Cardiolite performed 6/4/2020 with small to medium sized infarct in inferior wall and apex no ischemia.  EF of 70%.         ECG 12 Lead    Date/Time: 8/24/2023 1:45 PM  Performed by: Umer Pandya MD  Authorized by: Umer Pandya MD   Comparison: compared with previous ECG from 8/24/2022  Comparison to previous ECG: EKG done today reviewed/interpreted by me reveals sinus rhythm with rate of 60 bpm, no new change compared EKG from 8/24/2020        Copied text in this portion of the note has been reviewed and is accurate as of 8/24/2023  The following portions of the patient's history were reviewed and updated as appropriate: allergies, current medications, past family history, past medical history, past social history, past surgical history and problem list.    Assessment:         MDM       Diagnosis Plan   1. Dizziness  ECG 12 Lead      2. Essential hypertension  ECG 12 Lead      3. Dyslipidemia  ECG 12 Lead      4. Type 2 diabetes mellitus without complication, without long-term current use of insulin  ECG 12 Lead      5. Morbid obesity with BMI of 40.0-44.9, adult  ECG 12 Lead             Plan:               Past Medical History:  Past Medical History:   Diagnosis Date    Asthma     exercise induced    Depression     DM type 2 (diabetes mellitus, type 2)     Enlarged thyroid     MD WATCHES    Fibromyalgia, primary     GERD (gastroesophageal reflux disease)     History of cellulitis     LEFT LEG. AFTER A FALL.     Hypertension     Obesities, morbid     Rheumatoid arthritis      Seasonal allergies     Shortness of breath     Skin cancer     REMOVED FROM NECK    Sleep apnea     BIPAP    Tinnitus      Past Surgical History:  Past Surgical History:   Procedure Laterality Date     SECTION      x2    ENDOSCOPY N/A 2020    Procedure: esophagogastroduodenoscopy with biopsy;  Surgeon: Tuyet Neely MD;  Location:  [facility];  Service: General;  Laterality: N/A;   Normal    FOOT SURGERY      GASTRIC SLEEVE LAPAROSCOPIC N/A 10/21/2020    Procedure: GASTRIC SLEEVE LAPAROSCOPIC;  Surgeon: Tuyet Neely MD;  Location:  [facility];  Service: Bariatric;  Laterality: N/A;    HAND SURGERY Right     UCL repair right thumb    SKIN CANCER EXCISION      TUBAL ABDOMINAL LIGATION        Allergies:  Allergies   Allergen Reactions    Lisinopril Unknown (See Comments)     Fluid retention and cough       Home Meds:  Current Meds:     Current Outpatient Medications:     acetaminophen (TYLENOL) 500 MG tablet, Take 1-2 tablets by mouth Every 6 (Six) Hours As Needed for Mild Pain., Disp: , Rfl:     DULoxetine (CYMBALTA) 30 MG capsule, TAKE 1/2 TAB LEXAPRO FOR 2 DAYS, THEN TAKE 1 CAPSULE BY MOUTH DAILY, Disp: , Rfl:     gabapentin (NEURONTIN) 100 MG capsule, Take 3 capsules by mouth every night at bedtime. 3 at night, 1 in the am and 1 in the afternooon, Disp: , Rfl:     hydroCHLOROthiazide (MICROZIDE) 12.5 MG capsule, Take 1 capsule by mouth Daily., Disp: , Rfl: 1    metoprolol succinate XL (TOPROL-XL) 50 MG 24 hr tablet, Take 1 tablet by mouth Daily., Disp: 90 tablet, Rfl: 3    multivitamin with minerals tablet tablet, Take 1 tablet by mouth Daily., Disp: , Rfl:     OZEMPIC, 0.25 OR 0.5 MG/DOSE, 2 MG/1.5ML solution pen-injector, Inject 0.5 mg under the skin into the appropriate area as directed 1 (One) Time Per Week. SUNDAYS, Disp: , Rfl:     traMADol (ULTRAM) 50 MG tablet, TAKE ONE TABLET BY MOUTH EVERY 6 HOURS AS NEEDED, Disp: , Rfl:     vitamin B-12 (CYANOCOBALAMIN) 1000 MCG tablet, Take 0.5  "tablets by mouth 1 (One) Time Per Week. 2x a week, Disp: , Rfl:     aspirin 81 MG EC tablet, Take 1 tablet by mouth Daily., Disp: 90 tablet, Rfl: 3    atorvastatin (LIPITOR) 10 MG tablet, Take 1 tablet by mouth Daily., Disp: 90 tablet, Rfl: 3    escitalopram (LEXAPRO) 10 MG tablet, Take 1 tablet by mouth Daily. (Patient not taking: Reported on 2023), Disp: , Rfl:     escitalopram (LEXAPRO) 5 MG tablet, Take 5 mg by mouth Daily. (Patient not taking: Reported on 2023), Disp: , Rfl:     famotidine (PEPCID) 20 MG tablet, Take 10 mg by mouth Daily. (Patient not taking: Reported on 2023), Disp: , Rfl: 2    TRADJENTA 5 MG tablet tablet, Take 5 mg by mouth Daily. (Patient not taking: Reported on 2023), Disp: , Rfl: 0  Social History:   Social History     Tobacco Use    Smoking status: Some Days     Packs/day: 0.50     Years: 1.00     Pack years: 0.50     Types: Cigarettes     Start date: 2020     Last attempt to quit: 10/2021     Years since quittin.8    Smokeless tobacco: Never    Tobacco comments:     Off and on   Substance Use Topics    Alcohol use: Yes     Alcohol/week: 3.0 standard drinks     Types: 3 Cans of beer per week     Comment: SOCIALLY      Family History:  Family History   Problem Relation Age of Onset    Diabetes Mother     COPD Mother     Heart disease Mother     Anemia Mother     Heart attack Mother         Ischemic    Hypertension Mother     No Known Problems Father     Diabetes Brother     Malig Hyperthermia Neg Hx               Review of Systems   Constitutional: Negative for malaise/fatigue.   Cardiovascular:  Positive for leg swelling. Negative for chest pain and palpitations.   Respiratory:  Negative for shortness of breath.    Skin:  Negative for rash.   Neurological:  Positive for dizziness and light-headedness. Negative for numbness.   All other systems are negative         Objective:     Physical Exam  /80   Pulse 64   Ht 162.6 cm (64\")   Wt 116 kg (256 lb)  " " BMI 43.94 kg/mý   General:  Appears in no acute distress  Eyes: Sclera is anicteric,  conjunctiva is clear   HEENT:  No JVD.  No carotid bruits.  Goiter present.  Respiratory: Respirations regular and unlabored at rest.  Clear to auscultation  Cardiovascular: S1,S2 Regular rate and rhythm. No murmur, rub or gallop auscultated.   Extremities: No digital clubbing or cyanosis, no edema  Skin: Color pink. Skin warm and dry to touch. No rashes  No xanthoma  Neuro: Alert and awake.    Lab Reviewed:         Umer Pandya MD  8/24/2023 13:53 EDT      EMR Dragon/Transcription:   \"Dictated utilizing Dragon dictation\".        "

## 2023-10-20 ENCOUNTER — OFFICE VISIT (OUTPATIENT)
Dept: BARIATRICS/WEIGHT MGMT | Facility: CLINIC | Age: 58
End: 2023-10-20
Payer: COMMERCIAL

## 2023-10-20 VITALS
HEART RATE: 56 BPM | WEIGHT: 263 LBS | SYSTOLIC BLOOD PRESSURE: 137 MMHG | DIASTOLIC BLOOD PRESSURE: 68 MMHG | HEIGHT: 64 IN | OXYGEN SATURATION: 99 % | BODY MASS INDEX: 44.9 KG/M2

## 2023-10-20 DIAGNOSIS — E66.01 OBESITY, CLASS III, BMI 40-49.9 (MORBID OBESITY): Primary | ICD-10-CM

## 2023-10-20 RX ORDER — ONDANSETRON 8 MG/1
8 TABLET, ORALLY DISINTEGRATING ORAL EVERY 8 HOURS PRN
Qty: 30 TABLET | Refills: 5 | Status: SHIPPED | OUTPATIENT
Start: 2023-10-20

## 2023-10-20 RX ORDER — SEMAGLUTIDE 1.34 MG/ML
1 INJECTION, SOLUTION SUBCUTANEOUS WEEKLY
Qty: 3 ML | Refills: 0 | Status: SHIPPED | OUTPATIENT
Start: 2023-10-20 | End: 2023-11-11

## 2023-10-20 NOTE — PROGRESS NOTES
[facility] BAR SURG [facility] BARIATRIC SURGERY  [address] [place] [address]  [address] [place] [address]  Dept: [phone]  10/20/2023      Nighat May.  [id number]  [id number]  1965  female    Date of last surgery:     10/21/2020Gastric Sleeve Laparoscopic      Chief Complaint: BH Post-Op Bariatric Surgery:   Nighat May is status post procedure listed above  HPI:     Wt Readings from Last 10 Encounters:   10/20/23 119 kg (263 lb)   08/24/23 116 kg (256 lb)   10/19/22 117 kg (257 lb 9.6 oz)   08/24/22 120 kg (264 lb)   04/18/22 125 kg (276 lb 6.4 oz)   10/20/21 132 kg (290 lb 9.6 oz)   07/22/21 136 kg (299 lb 3.2 oz)   07/15/21 136 kg (299 lb)   04/21/21 (!) 143 kg (316 lb 3.2 oz)   01/20/21 (!) 150 kg (330 lb)        Today's weight is 119 kg (263 lb) pounds,BMI@,HE@ has a  gain of 5 pounds since the last visit andHIS@ weight loss since surgery is 70 pounds. The patient reports a decreased portion size and loss of appetite.      Nighat May does have frequent loose bms.        Diet and Exercise: Diet history reviewed and discussed with the patient. Weight loss/gains to date discussed with the patient.     She reports eating 5 meals per day, a typical portion size of 1-2 cup, eating 0 snacks per day, drinking 8 or more 8-oz. glasses of water per day, no carbonated beverage consumption and exercising regularly.     2-3 protein shakes per day- premier protein     Work [employer] 3d per week    Dinner meat and veg      Snack- kettle chips      The patient states they are eating 90 grams of protein per day.           Review of Systems    Review of Systems   Constitutional: Negative.    HENT: Negative.    Eyes: Negative.    Respiratory: Negative.    Cardiovascular: Negative.    Gastrointestinal: Negative.    Endocrine: Negative.    Genitourinary: Negative.    Musculoskeletal: Negative.    Skin: Negative.    Allergic/Immunologic: Negative.     Neurological: Negative.    Hematological: Negative.    Psychiatric/Behavioral: Negative.    Patient Active Problem List   Diagnosis    Carpal tunnel syndrome, bilateral upper limbs    Chest pain    Depression    Diabetes mellitus    Elevated erythrocyte sedimentation rate    Encounter for screening for osteoporosis    Fatigue    Fibromyalgia    HTN (hypertension)    Seronegative arthritis    Inflammatory arthritis    Joint pain    Arthralgia of left wrist    Body mass index 50.0-59.9, adult    Obesity    Other long term (current) drug therapy    Other specified disorders of bone density and structure, other site    Rotator cuff tendonitis    Urinary tract infection    Vitamin D deficiency    Morbid obesity    Pre-operative examination    Body mass index (BMI) of 60.0-69.9 in adult    Super obese    Super-super obese       Past Medical History:   Diagnosis Date    Asthma     exercise induced    Carpal tunnel syndrome     Depression     DM type 2 (diabetes mellitus, type 2)     Enlarged thyroid     MD WATCHES    Fibromyalgia, primary     GERD (gastroesophageal reflux disease)     History of cellulitis     LEFT LEG. AFTER A FALL.     Hypertension     Obesities, morbid     Rheumatoid arthritis     Seasonal allergies     Shortness of breath     Skin cancer     REMOVED FROM NECK    Sleep apnea     BIPAP    Tinnitus      Past Surgical History:   Procedure Laterality Date    ENDOSCOPY N/A 2020    Procedure: esophagogastroduodenoscopy with biopsy;  Surgeon: Tuyet Neely MD;  Location: [facility] ENDOSCOPY;  Service: General;  Laterality: N/A;   Normal    GASTRIC SLEEVE LAPAROSCOPIC N/A 10/21/2020    Procedure: GASTRIC SLEEVE LAPAROSCOPIC;  Surgeon: Tuyet Neely MD;  [facility] [place] [facility];  Service: Bariatric;  Laterality: N/A;     SECTION      x2    FOOT SURGERY      HAND SURGERY Right     UCL repair right thumb    SKIN CANCER EXCISION      TUBAL ABDOMINAL LIGATION        The following portions of the patient's  history were reviewed and updated as appropriate: allergies, current medications, past family history, past medical history, past social history, past surgical history, and problem list.    Vitals:    10/20/23 1136   BP: 137/68   Pulse: 56   SpO2: 99%       Physical Exam  Awake and alert  Normal mental status  Normal pulmonary effort  Abdomen appropriate tenderness  Incisions no erythema  Extremities no tenderness or swelling      Assessment:       Morbid obesity bmi 45    Post-op, the patient weight is stable .     Plan:   Will check labs.   Encouraged patient to be sure to get plenty of lean protein per day through small frequent meals all with a protein source.   Activity restrictions: none.   Recommended patient be sure to get at least 70 grams of protein per day by eating small, frequent meals all with high lean protein choices. Be sure to limit/cut back on daily carbohydrate intake. Discussed with the patient the recommended amount of water per day to intake- half of body weight in ounces. Reviewed vitamin requirements. Be sure to do routine exercise, 150 minutes per week minimum, including both cardio and strength training.     Instructions / Recommendations: dietary counseling recommended, recommended a daily protein intake of  grams, vitamin supplement(s) recommended, recommended exercising at least 150 minutes per week, behavior modifications recommended and instructed to call the office for concerns, questions, or problems.     The patient was instructed to follow up in 12 months.     The patient was counseled regarding. Total time spent face to face was 15 minutes and 15 minutes was spent counseling.

## 2023-11-22 ENCOUNTER — TELEPHONE (OUTPATIENT)
Dept: BARIATRICS/WEIGHT MGMT | Facility: CLINIC | Age: 58
End: 2023-11-22
Payer: COMMERCIAL

## 2023-11-22 NOTE — TELEPHONE ENCOUNTER
Left vm for pt to call office    Need pt to change from 8.30am to 8am on 11.27.23 on Dr Head schedule

## 2023-11-27 RX ORDER — SEMAGLUTIDE 2.68 MG/ML
2 INJECTION, SOLUTION SUBCUTANEOUS WEEKLY
Qty: 3 ML | Refills: 0 | Status: SHIPPED | OUTPATIENT
Start: 2023-11-27 | End: 2023-12-19

## 2024-01-03 ENCOUNTER — TELEPHONE (OUTPATIENT)
Dept: BARIATRICS/WEIGHT MGMT | Facility: CLINIC | Age: 59
End: 2024-01-03
Payer: COMMERCIAL

## 2024-01-03 RX ORDER — SEMAGLUTIDE 2.68 MG/ML
2 INJECTION, SOLUTION SUBCUTANEOUS WEEKLY
Qty: 3 ML | Refills: 2 | Status: SHIPPED | OUTPATIENT
Start: 2024-01-03

## 2024-01-03 NOTE — TELEPHONE ENCOUNTER
Pt called for refill of Ozempic:     PREFERRED PHARMACY: [facility]    Requesting 90 day supply     CURRENT WEIGHT: 255 lbs  CURRENT DOSAGE: 2 mg   SYMPTOMS: No abd pain; no n/v; no new symptoms w/meds  TITRATION/CHANGE IN DOSAGE:  2 mg    LAST DOSE: 1/3/24  DOSES REMAINING: None     PT COMPLIANT? Yes

## 2024-03-29 RX ORDER — SEMAGLUTIDE 2.68 MG/ML
2 INJECTION, SOLUTION SUBCUTANEOUS WEEKLY
Qty: 9 ML | Refills: 1 | Status: SHIPPED | OUTPATIENT
Start: 2024-03-29

## 2024-08-05 RX ORDER — ATORVASTATIN CALCIUM 10 MG/1
10 TABLET, FILM COATED ORAL DAILY
Qty: 90 TABLET | Refills: 3 | Status: SHIPPED | OUTPATIENT
Start: 2024-08-05

## 2024-08-05 RX ORDER — METOPROLOL SUCCINATE 50 MG/1
50 TABLET, EXTENDED RELEASE ORAL DAILY
Qty: 90 TABLET | Refills: 3 | Status: SHIPPED | OUTPATIENT
Start: 2024-08-05

## 2024-08-05 NOTE — TELEPHONE ENCOUNTER
Rx Refill Note  Requested Prescriptions     Pending Prescriptions Disp Refills    atorvastatin (LIPITOR) 10 MG tablet [Pharmacy Med Name: ATORVASTATIN 10 MG TABLET] 90 tablet 3     Sig: TAKE 1 TABLET BY MOUTH EVERY DAY    metoprolol succinate XL (TOPROL-XL) 50 MG 24 hr tablet [Pharmacy Med Name: METOPROLOL SUCC ER 50 MG TAB] 90 tablet 3     Sig: TAKE 1 TABLET BY MOUTH EVERY DAY      Last office visit with prescribing clinician: 8/24/2023   Last telemedicine visit with prescribing clinician: Visit date not found   Next office visit with prescribing clinician: 8/19/2024                         Would you like a call back once the refill request has been completed: [] Yes [] No    If the office needs to give you a call back, can they leave a voicemail: [] Yes [] No    Génesis Rodríguez MA  08/05/24, 14:28 EDT

## 2024-08-18 NOTE — PROGRESS NOTES
Subjective:     Encounter Date:2024      Patient ID: Nighat May is a 59 y.o. female.    Chief Complaint and history of present illness:     Follow-up for CAD with fixed inferior defect, hypertension, dyslipidemia, diabetes     History of Present Illness       Ms. Nighat May has PMH of     -ASHD, stress test 2020 showing fixed inferior defect.  -Type 2 diabetes  -Morbid obesity  -Hypertension  -Dyslipidemia  -Rheumatoid arthritis  -Right neck goiter  -, tubal ligation, hand surgery  -Former smoker, intermittently smokes  -Positive family history of premature CAD mother  at 59 with MI     Here for   follow-up.  Patient is complaining of dyspnea on exertion.  Occasional chest pain with no aggravating or relieving factors and left arm pain.  Has left greater than right arm pain.        Patient's arterial blood pressure is 114/70 , heart rate 62 bpm.  BMI is over 40.     Patient had Lexiscan Cardiolite 2020 which showed diaphragmatic attenuation versus consistent with inferior wall MI..     Review of labs from 2020 revealed normal CMP and CBC.  Labs from 2020 reveal CMP with a glucose of 194 and potassium of 3.3.  Labs from 8/15/2024 reveal normal CBC CMP and TSH.  Lipid profile with cholesterol 131, triglycerides 44, HDL 53, LDL 69.           Assessment:       -Chest pain  -Dyspnea on exertion  -Abnormal stress with diaphragmatic attenuation versus inferior wall MI  -Diabetes, hypertension, dyslipidemia  -Morbid obesity  -Positive family for premature CAD  -Cigarette smoker     Recommendations / Plan:         EKG results with patient.  Patient is complaining of dyspnea and chest pain and left arm pain.  Will schedule stress test.  Patient says she cannot walk on treadmill due to conditioning and musculoskeletal reasons.  Will do Lexiscan Cardiolite.  Reviewed BMI over 40, counseled on weight loss diet and exercise.     Continue medical management with atorvastatin,  hydrochlorothiazide, metoprolol succinate as tolerated.  Counseled on smoking cessation diet exercise.  Follow-up with PMD for hyperglycemia and diabetes.  Patient has goiter on the right side of the neck.  She says she is following up with ENT         Data:  Echocardiogram 6/9/2020: Normal LV systolic function EF of 60% with mild to moderate RV enlargement, moderate left atrial enlargement severe right atrial enlargement.  Mild MR.    Lexiscan Cardiolite performed 6/4/2020 with small to medium sized infarct in inferior wall and apex no ischemia.  EF of 70%.           ECG 12 Lead    Date/Time: 8/19/2024 7:16 PM  Performed by: Umer Pandya MD    Authorized by: Umer Pandya MD  Comparison: compared with previous ECG from 8/24/2023  Comparison to previous ECG: EKG done today reviewed/interpreted by me reveals sinus rhythm with a rate of 66 bpm, no significant change compared to EKG from 8/24/2023          Copied text in this portion of the note has been reviewed and is accurate as of 8/19/2024  The following portions of the patient's history were reviewed and updated as appropriate: allergies, current medications, past family history, past medical history, past social history, past surgical history and problem list.    Assessment:         MDM       Diagnosis Plan   1. Precordial pain  Stress Test With Myocardial Perfusion One Day      2. Dyspnea on exertion  Stress Test With Myocardial Perfusion One Day      3. Essential hypertension  Stress Test With Myocardial Perfusion One Day      4. Dyslipidemia  Stress Test With Myocardial Perfusion One Day             Plan:               Past Medical History:  Past Medical History:   Diagnosis Date    Asthma     exercise induced    Carpal tunnel syndrome     Depression     DM type 2 (diabetes mellitus, type 2)     Enlarged thyroid     MD WATCHES    Fibromyalgia, primary     GERD (gastroesophageal reflux disease)     History of cellulitis     LEFT  LEG. AFTER A FALL.     Hypertension     Obesities, morbid     Rheumatoid arthritis     Seasonal allergies     Shortness of breath     Skin cancer     REMOVED FROM NECK    Sleep apnea     BIPAP    Tinnitus      Past Surgical History:  Past Surgical History:   Procedure Laterality Date     SECTION      x2    ENDOSCOPY N/A 2020    Procedure: esophagogastroduodenoscopy with biopsy;  Surgeon: Tuyet Neely MD;  Location:  [facility];  Service: General;  Laterality: N/A;   Normal    FOOT SURGERY      GASTRIC SLEEVE LAPAROSCOPIC N/A 10/21/2020    Procedure: GASTRIC SLEEVE LAPAROSCOPIC;  Surgeon: Tuyet Neely MD;  Location:  [facility];  Service: Bariatric;  Laterality: N/A;    HAND SURGERY Right     UCL repair right thumb    SKIN CANCER EXCISION      TUBAL ABDOMINAL LIGATION        Allergies:  Allergies   Allergen Reactions    Lisinopril Unknown (See Comments)     Fluid retention and cough       Home Meds:  Current Meds:     Current Outpatient Medications:     acetaminophen (TYLENOL) 500 MG tablet, Take 1 tablet by mouth Every 6 (Six) Hours As Needed for Mild Pain. 650 MG, Disp: , Rfl:     atorvastatin (LIPITOR) 10 MG tablet, TAKE 1 TABLET BY MOUTH EVERY DAY, Disp: 90 tablet, Rfl: 3    DULoxetine (CYMBALTA) 30 MG capsule, TAKE 1/2 TAB LEXAPRO FOR 2 DAYS, THEN TAKE 1 CAPSULE BY MOUTH DAILY, Disp: , Rfl:     gabapentin (NEURONTIN) 100 MG capsule, Take 3 capsules by mouth every night at bedtime. 3 at night, 1 in the am and 1 in the afternooon, Disp: , Rfl:     hydroCHLOROthiazide (MICROZIDE) 12.5 MG capsule, Take 1 capsule by mouth Daily., Disp: , Rfl: 1    metoprolol succinate XL (TOPROL-XL) 50 MG 24 hr tablet, TAKE 1 TABLET BY MOUTH EVERY DAY, Disp: 90 tablet, Rfl: 3    multivitamin with minerals tablet tablet, Take 1 tablet by mouth Daily., Disp: , Rfl:     Semaglutide, 2 MG/DOSE, (Ozempic, 2 MG/DOSE,) 8 MG/3ML solution pen-injector, INJECT 2 MG UNDER THE SKIN INTO THE APPROPRIATE AREA AS DIRECTED 1  "(ONE) TIME PER WEEK., Disp: 9 mL, Rfl: 1    traMADol (ULTRAM) 50 MG tablet, TAKE ONE TABLET BY MOUTH EVERY 6 HOURS AS NEEDED, Disp: , Rfl:   Social History:   Social History     Tobacco Use    Smoking status: Every Day     Current packs/day: 0.00     Average packs/day: 0.5 packs/day for 1.6 years (0.8 ttl pk-yrs)     Types: Cigarettes     Start date: 2020     Last attempt to quit: 10/2021     Years since quittin.8     Passive exposure: Current    Smokeless tobacco: Never    Tobacco comments:     Off and on   Substance Use Topics    Alcohol use: Yes     Alcohol/week: 3.0 standard drinks of alcohol     Types: 3 Cans of beer per week     Comment: SOCIALLY      Family History:  Family History   Problem Relation Age of Onset    Diabetes Mother     COPD Mother     Heart disease Mother     Anemia Mother     Heart attack Mother         Ischemic    Hypertension Mother     No Known Problems Father     Diabetes Brother     Malig Hyperthermia Neg Hx               Review of Systems   Cardiovascular:  Positive for leg swelling and palpitations. Negative for chest pain.   Respiratory:  Negative for shortness of breath.    Neurological:  Positive for dizziness and numbness.     All other systems are negative         Objective:     Physical Exam  /70 (BP Location: Left arm, Patient Position: Sitting, Cuff Size: Large Adult)   Pulse 62   Ht 162.6 cm (64\")   Wt 113 kg (250 lb)   SpO2 97%   BMI 42.91 kg/m²   General:  Appears in no acute distress  Eyes: Sclera is anicteric,  conjunctiva is clear   HEENT:  No JVD.  No carotid bruits  Respiratory: Respirations regular and unlabored at rest.  Clear to auscultation  Cardiovascular: S1,S2 Regular rate and rhythm. .   Extremities: No digital clubbing or cyanosis, no edema  Skin: Color pink. Skin warm and dry to touch. No rashes  No xanthoma  Neuro: Alert and awake.    Lab Reviewed:         Umer Pandya MD  2024 19:13 EDT      EMR Dragon/Transcription: " "  \"Dictated utilizing Dragon dictation\".        "

## 2024-08-19 ENCOUNTER — OFFICE VISIT (OUTPATIENT)
Dept: CARDIOLOGY | Facility: CLINIC | Age: 59
End: 2024-08-19
Payer: COMMERCIAL

## 2024-08-19 VITALS
HEART RATE: 62 BPM | HEIGHT: 64 IN | SYSTOLIC BLOOD PRESSURE: 114 MMHG | DIASTOLIC BLOOD PRESSURE: 70 MMHG | OXYGEN SATURATION: 97 % | BODY MASS INDEX: 42.68 KG/M2 | WEIGHT: 250 LBS

## 2024-08-19 DIAGNOSIS — R07.2 PRECORDIAL PAIN: Primary | ICD-10-CM

## 2024-08-19 DIAGNOSIS — E78.5 DYSLIPIDEMIA: ICD-10-CM

## 2024-08-19 DIAGNOSIS — E66.01 MORBID OBESITY WITH BMI OF 40.0-44.9, ADULT: ICD-10-CM

## 2024-08-19 DIAGNOSIS — I10 ESSENTIAL HYPERTENSION: ICD-10-CM

## 2024-08-19 DIAGNOSIS — R06.09 DYSPNEA ON EXERTION: ICD-10-CM

## 2024-08-19 PROCEDURE — 93000 ELECTROCARDIOGRAM COMPLETE: CPT | Performed by: INTERNAL MEDICINE

## 2024-08-19 PROCEDURE — 99214 OFFICE O/P EST MOD 30 MIN: CPT | Performed by: INTERNAL MEDICINE

## 2024-09-03 RX ORDER — SEMAGLUTIDE 2.68 MG/ML
2 INJECTION, SOLUTION SUBCUTANEOUS WEEKLY
Qty: 3 ML | Refills: 2 | Status: SHIPPED | OUTPATIENT
Start: 2024-09-03

## 2025-07-31 ENCOUNTER — TELEPHONE (OUTPATIENT)
Dept: CARDIOLOGY | Facility: CLINIC | Age: 60
End: 2025-07-31
Payer: COMMERCIAL

## 2025-07-31 RX ORDER — ATORVASTATIN CALCIUM 10 MG/1
10 TABLET, FILM COATED ORAL DAILY
Qty: 30 TABLET | Refills: 0 | Status: SHIPPED | OUTPATIENT
Start: 2025-07-31

## 2025-07-31 NOTE — TELEPHONE ENCOUNTER
Rx Refill Note  Requested Prescriptions     Pending Prescriptions Disp Refills    atorvastatin (LIPITOR) 10 MG tablet [Pharmacy Med Name: ATORVASTATIN 10 MG TABLET] 30 tablet 0     Sig: TAKE 1 TABLET BY MOUTH EVERY DAY      Last office visit with prescribing clinician: 8/19/2024   Last telemedicine visit with prescribing clinician: Visit date not found   Next office visit with prescribing clinician: Visit date not found                         Would you like a call back once the refill request has been completed: [] Yes [] No    If the office needs to give you a call back, can they leave a voicemail: [] Yes [] No    Caitlin Vergara MA  07/31/25, 10:05 EDT

## 2025-07-31 NOTE — TELEPHONE ENCOUNTER
LOV 8/24   NEEDS ONE YEAR FU TO CONTINUE TO GET REFILLS     STRESS TEST WAS CX, DOES SHE WANT TO RESCHEDULE

## 2025-08-22 RX ORDER — METOPROLOL SUCCINATE 50 MG/1
50 TABLET, EXTENDED RELEASE ORAL DAILY
Qty: 90 TABLET | Refills: 0 | Status: SHIPPED | OUTPATIENT
Start: 2025-08-22

## 2025-08-26 ENCOUNTER — OFFICE VISIT (OUTPATIENT)
Dept: CARDIOLOGY | Facility: CLINIC | Age: 60
End: 2025-08-26
Payer: COMMERCIAL

## 2025-08-26 VITALS
WEIGHT: 273 LBS | HEART RATE: 65 BPM | BODY MASS INDEX: 46.61 KG/M2 | OXYGEN SATURATION: 96 % | SYSTOLIC BLOOD PRESSURE: 135 MMHG | DIASTOLIC BLOOD PRESSURE: 56 MMHG | HEIGHT: 64 IN

## 2025-08-26 DIAGNOSIS — E78.5 DYSLIPIDEMIA: ICD-10-CM

## 2025-08-26 DIAGNOSIS — R06.09 DYSPNEA ON EXERTION: ICD-10-CM

## 2025-08-26 DIAGNOSIS — E66.813 CLASS 3 SEVERE OBESITY WITH SERIOUS COMORBIDITY AND BODY MASS INDEX (BMI) OF 45.0 TO 49.9 IN ADULT, UNSPECIFIED OBESITY TYPE: ICD-10-CM

## 2025-08-26 DIAGNOSIS — I10 PRIMARY HYPERTENSION: ICD-10-CM

## 2025-08-26 DIAGNOSIS — I20.89 ANGINAL EQUIVALENT: Primary | ICD-10-CM

## 2025-08-26 PROCEDURE — 93000 ELECTROCARDIOGRAM COMPLETE: CPT | Performed by: NURSE PRACTITIONER

## 2025-08-26 PROCEDURE — 99214 OFFICE O/P EST MOD 30 MIN: CPT | Performed by: NURSE PRACTITIONER

## 2025-08-26 RX ORDER — SODIUM CHLORIDE 0.9 % (FLUSH) 0.9 %
10 SYRINGE (ML) INJECTION AS NEEDED
OUTPATIENT
Start: 2025-08-26

## 2025-08-26 RX ORDER — NITROGLYCERIN 0.4 MG/1
0.8 TABLET SUBLINGUAL
OUTPATIENT
Start: 2025-08-26

## 2025-08-26 RX ORDER — ALBUTEROL SULFATE 1.25 MG/3ML
1 SOLUTION RESPIRATORY (INHALATION) 4 TIMES DAILY
COMMUNITY
Start: 2025-07-08

## 2025-08-26 RX ORDER — MONTELUKAST SODIUM 10 MG/1
1 TABLET ORAL DAILY
COMMUNITY
Start: 2025-08-12

## 2025-08-26 RX ORDER — ALBUTEROL SULFATE 90 UG/1
2 INHALANT RESPIRATORY (INHALATION) EVERY 6 HOURS PRN
COMMUNITY
Start: 2025-06-06

## 2025-08-26 RX ORDER — METOPROLOL TARTRATE 25 MG/1
150 TABLET, FILM COATED ORAL ONCE
OUTPATIENT
Start: 2025-08-26

## 2025-08-26 RX ORDER — METOPROLOL TARTRATE 25 MG/1
100 TABLET, FILM COATED ORAL ONCE
OUTPATIENT
Start: 2025-08-26

## 2025-08-26 RX ORDER — METOPROLOL TARTRATE 25 MG/1
50 TABLET, FILM COATED ORAL ONCE
OUTPATIENT
Start: 2025-08-26

## 2025-08-26 RX ORDER — SODIUM CHLORIDE 9 MG/ML
40 INJECTION, SOLUTION INTRAVENOUS AS NEEDED
OUTPATIENT
Start: 2025-08-26

## 2025-08-26 RX ORDER — METOPROLOL TARTRATE 1 MG/ML
5 INJECTION, SOLUTION INTRAVENOUS
OUTPATIENT
Start: 2025-08-26

## 2025-08-26 RX ORDER — SODIUM CHLORIDE 0.9 % (FLUSH) 0.9 %
10 SYRINGE (ML) INJECTION EVERY 12 HOURS SCHEDULED
OUTPATIENT
Start: 2025-08-26

## 2025-08-26 RX ORDER — NITROGLYCERIN 0.4 MG/1
0.4 TABLET SUBLINGUAL
OUTPATIENT
Start: 2025-08-26 | End: 2025-08-26

## 2025-08-26 RX ORDER — METOPROLOL TARTRATE 50 MG
50 TABLET ORAL
OUTPATIENT
Start: 2025-08-26

## 2025-08-26 RX ORDER — DULOXETINE 40 MG/1
1 CAPSULE, DELAYED RELEASE ORAL DAILY
COMMUNITY
Start: 2025-07-29

## 2025-08-26 RX ORDER — METOPROLOL TARTRATE 25 MG/1
200 TABLET, FILM COATED ORAL ONCE
OUTPATIENT
Start: 2025-08-26 | End: 2025-08-26

## 2025-08-27 ENCOUNTER — TELEPHONE (OUTPATIENT)
Dept: CARDIOLOGY | Facility: CLINIC | Age: 60
End: 2025-08-27
Payer: COMMERCIAL

## 2025-08-27 PROBLEM — E66.9 SUPER OBESE: Status: RESOLVED | Noted: 2020-09-16 | Resolved: 2025-08-27

## 2025-08-27 PROBLEM — E78.5 DYSLIPIDEMIA: Status: ACTIVE | Noted: 2025-08-27

## 2025-08-27 PROBLEM — E66.01 SUPER-SUPER OBESE: Status: RESOLVED | Noted: 2020-10-02 | Resolved: 2025-08-27

## 2025-08-29 RX ORDER — ATORVASTATIN CALCIUM 10 MG/1
10 TABLET, FILM COATED ORAL DAILY
Qty: 90 TABLET | Refills: 0 | Status: SHIPPED | OUTPATIENT
Start: 2025-08-29

## (undated) DEVICE — VISIGI 3D®  CALIBRATION SYSTEM  SIZE 36FR STD W/ BULB: Brand: BOEHRINGER® VISIGI 3D™ SLEEVE GASTRECTOMY CALIBRATION SYSTEM, SIZE 36FR W/BULB

## (undated) DEVICE — PK ENDO GI 50

## (undated) DEVICE — PASS SUT PRO BARIATRIC XL W/TROC SWABS

## (undated) DEVICE — GLV SURG SIGNATURE ESSENTIAL PF LTX SZ7.5

## (undated) DEVICE — SOL NACL 0.9PCT 1000ML

## (undated) DEVICE — PAPR PRNT PK SONY W RIBN UPC55

## (undated) DEVICE — SYRINGE,TOOMEY,IRRIGATION,70CC,STERILE: Brand: MEDLINE

## (undated) DEVICE — ENSEAL LAPAROSCOPIC TISSUE SEALER G2 ARTICULATING CURVED JAW FOR USE WITH G2 GENERATOR 5MM DIAMETER 45CM SHAFT LENGTH: Brand: ENSEAL

## (undated) DEVICE — ENDOPATH XCEL WITH OPTIVIEW TECHNOLOGY BLADELESS TROCARS WITH STABILITY SLEEVES: Brand: ENDOPATH XCEL OPTIVIEW

## (undated) DEVICE — 40580 - THE PINK PAD - ADVANCED TRENDELENBURG POSITIONING KIT: Brand: 40580 - THE PINK PAD - ADVANCED TRENDELENBURG POSITIONING KIT

## (undated) DEVICE — LAPAROSCOPIC DISSECTOR: Brand: DEROYAL

## (undated) DEVICE — PK BARIATRIC 50

## (undated) DEVICE — CLMP STD 25CM DISP

## (undated) DEVICE — TROCAR: Brand: KII OPTICAL ACCESS SYSTEM

## (undated) DEVICE — APL DUPLOSPRAYER MIS 40CM

## (undated) DEVICE — ECHELON FLEX POWERED PLUS LONG ARTICULATING ENDOSCOPIC LINEAR CUTTER, 60MM: Brand: ECHELON FLEX

## (undated) DEVICE — LAPAROSCOPIC GAS CONDITIONING DEVICE.: Brand: INSUFLOW

## (undated) DEVICE — ENDOPATH XCEL BLADELESS TROCARS WITH STABILITY SLEEVES: Brand: ENDOPATH XCEL

## (undated) DEVICE — CUFF SCD HEMOFORCE SEQ CALF STD MD

## (undated) DEVICE — KT SURG TURNOVER 050

## (undated) DEVICE — SINGLE-USE BIOPSY FORCEPS: Brand: RADIAL JAW 4

## (undated) DEVICE — BITEBLOCK ENDO W/STRAP 60F A/ LF DISP

## (undated) DEVICE — UNDERGLV SURG BIOGEL INDICAT PF 8 GRN